# Patient Record
Sex: MALE | Race: WHITE | NOT HISPANIC OR LATINO | Employment: OTHER | URBAN - NONMETROPOLITAN AREA
[De-identification: names, ages, dates, MRNs, and addresses within clinical notes are randomized per-mention and may not be internally consistent; named-entity substitution may affect disease eponyms.]

---

## 2018-04-10 ENCOUNTER — OFFICE VISIT CONVERTED (OUTPATIENT)
Dept: FAMILY MEDICINE CLINIC | Age: 58
End: 2018-04-10
Attending: FAMILY MEDICINE

## 2018-07-12 ENCOUNTER — OFFICE VISIT CONVERTED (OUTPATIENT)
Dept: FAMILY MEDICINE CLINIC | Age: 58
End: 2018-07-12
Attending: FAMILY MEDICINE

## 2018-12-03 ENCOUNTER — OFFICE VISIT CONVERTED (OUTPATIENT)
Dept: FAMILY MEDICINE CLINIC | Age: 58
End: 2018-12-03
Attending: NURSE PRACTITIONER

## 2019-01-14 ENCOUNTER — HOSPITAL ENCOUNTER (OUTPATIENT)
Dept: OTHER | Facility: HOSPITAL | Age: 59
Discharge: HOME | End: 2019-01-14
Attending: FAMILY MEDICINE

## 2019-01-14 ENCOUNTER — OFFICE VISIT CONVERTED (OUTPATIENT)
Dept: FAMILY MEDICINE CLINIC | Age: 59
End: 2019-01-14
Attending: FAMILY MEDICINE

## 2019-01-14 LAB
ANION GAP SERPL CALC-SCNC: 15 MMOL/L (ref 8–19)
BUN SERPL-MCNC: 21 MG/DL (ref 5–25)
BUN/CREAT SERPL: 14 {RATIO} (ref 6–20)
CALCIUM SERPL-MCNC: 9 MG/DL (ref 8.7–10.4)
CHLORIDE SERPL-SCNC: 102 MMOL/L (ref 99–111)
CONV CO2: 28 MMOL/L (ref 22–32)
CREAT UR-MCNC: 1.47 MG/DL (ref 0.7–1.2)
GFR SERPLBLD BASED ON 1.73 SQ M-ARVRAT: 51 ML/MIN/{1.73_M2}
GLUCOSE SERPL-MCNC: 85 MG/DL (ref 70–99)
OSMOLALITY SERPL CALC.SUM OF ELEC: 294 MOSM/KG (ref 273–304)
POTASSIUM SERPL-SCNC: 3.8 MMOL/L (ref 3.5–5.3)
SODIUM SERPL-SCNC: 141 MMOL/L (ref 135–147)

## 2019-05-16 ENCOUNTER — HOSPITAL ENCOUNTER (OUTPATIENT)
Dept: OTHER | Facility: HOSPITAL | Age: 59
Discharge: HOME | End: 2019-05-16
Attending: FAMILY MEDICINE

## 2019-05-16 LAB
ANION GAP SERPL CALC-SCNC: 20 MMOL/L (ref 8–19)
BUN SERPL-MCNC: 16 MG/DL (ref 5–25)
BUN/CREAT SERPL: 12 {RATIO} (ref 6–20)
CALCIUM SERPL-MCNC: 9 MG/DL (ref 8.7–10.4)
CHLORIDE SERPL-SCNC: 104 MMOL/L (ref 99–111)
CONV CO2: 22 MMOL/L (ref 22–32)
CREAT UR-MCNC: 1.3 MG/DL (ref 0.7–1.2)
GFR SERPLBLD BASED ON 1.73 SQ M-ARVRAT: 59 ML/MIN/{1.73_M2}
GLUCOSE SERPL-MCNC: 93 MG/DL (ref 70–99)
OSMOLALITY SERPL CALC.SUM OF ELEC: 295 MOSM/KG (ref 273–304)
POTASSIUM SERPL-SCNC: 4.3 MMOL/L (ref 3.5–5.3)
SODIUM SERPL-SCNC: 142 MMOL/L (ref 135–147)

## 2019-08-23 ENCOUNTER — OFFICE VISIT CONVERTED (OUTPATIENT)
Dept: FAMILY MEDICINE CLINIC | Age: 59
End: 2019-08-23
Attending: FAMILY MEDICINE

## 2020-02-24 ENCOUNTER — OFFICE VISIT CONVERTED (OUTPATIENT)
Dept: FAMILY MEDICINE CLINIC | Age: 60
End: 2020-02-24
Attending: FAMILY MEDICINE

## 2020-02-24 ENCOUNTER — HOSPITAL ENCOUNTER (OUTPATIENT)
Dept: OTHER | Facility: HOSPITAL | Age: 60
Discharge: HOME | End: 2020-02-24
Attending: FAMILY MEDICINE

## 2020-02-24 LAB
ALBUMIN SERPL-MCNC: 4.6 G/DL (ref 3.5–5)
ALBUMIN/GLOB SERPL: 1.4 {RATIO} (ref 1.4–2.6)
ALP SERPL-CCNC: 53 U/L (ref 56–155)
ALT SERPL-CCNC: 35 U/L (ref 10–40)
ANION GAP SERPL CALC-SCNC: 26 MMOL/L (ref 8–19)
AST SERPL-CCNC: 29 U/L (ref 15–50)
BILIRUB SERPL-MCNC: 0.25 MG/DL (ref 0.2–1.3)
BUN SERPL-MCNC: 17 MG/DL (ref 5–25)
BUN/CREAT SERPL: 10 {RATIO} (ref 6–20)
CALCIUM SERPL-MCNC: 9.8 MG/DL (ref 8.7–10.4)
CHLORIDE SERPL-SCNC: 101 MMOL/L (ref 99–111)
CHOLEST SERPL-MCNC: 170 MG/DL (ref 107–200)
CHOLEST/HDLC SERPL: 5.3 {RATIO} (ref 3–6)
CONV CO2: 21 MMOL/L (ref 22–32)
CONV TOTAL PROTEIN: 7.8 G/DL (ref 6.3–8.2)
CREAT UR-MCNC: 1.73 MG/DL (ref 0.7–1.2)
GFR SERPLBLD BASED ON 1.73 SQ M-ARVRAT: 42 ML/MIN/{1.73_M2}
GLOBULIN UR ELPH-MCNC: 3.2 G/DL (ref 2–3.5)
GLUCOSE SERPL-MCNC: 99 MG/DL (ref 70–99)
HDLC SERPL-MCNC: 32 MG/DL (ref 40–60)
LDLC SERPL CALC-MCNC: 104 MG/DL (ref 70–100)
OSMOLALITY SERPL CALC.SUM OF ELEC: 300 MOSM/KG (ref 273–304)
POTASSIUM SERPL-SCNC: 3.8 MMOL/L (ref 3.5–5.3)
PSA SERPL-MCNC: 1.81 NG/ML (ref 0–4)
SODIUM SERPL-SCNC: 144 MMOL/L (ref 135–147)
TRIGL SERPL-MCNC: 415 MG/DL (ref 40–150)

## 2020-03-26 ENCOUNTER — OFFICE VISIT CONVERTED (OUTPATIENT)
Dept: FAMILY MEDICINE CLINIC | Age: 60
End: 2020-03-26
Attending: FAMILY MEDICINE

## 2020-06-01 ENCOUNTER — OFFICE VISIT CONVERTED (OUTPATIENT)
Dept: FAMILY MEDICINE CLINIC | Age: 60
End: 2020-06-01
Attending: FAMILY MEDICINE

## 2020-06-01 ENCOUNTER — HOSPITAL ENCOUNTER (OUTPATIENT)
Dept: OTHER | Facility: HOSPITAL | Age: 60
Discharge: HOME | End: 2020-06-01
Attending: FAMILY MEDICINE

## 2020-06-04 LAB — SARS-COV-2 RNA SPEC QL NAA+PROBE: NOT DETECTED

## 2020-07-02 ENCOUNTER — OFFICE VISIT CONVERTED (OUTPATIENT)
Dept: PULMONOLOGY | Facility: CLINIC | Age: 60
End: 2020-07-02
Attending: INTERNAL MEDICINE

## 2020-07-02 ENCOUNTER — HOSPITAL ENCOUNTER (OUTPATIENT)
Dept: OTHER | Facility: HOSPITAL | Age: 60
Discharge: HOME | End: 2020-07-02
Attending: INTERNAL MEDICINE

## 2020-07-02 LAB
BASOPHILS # BLD MANUAL: 0.06 10*3/UL (ref 0–0.2)
BASOPHILS NFR BLD MANUAL: 0.7 % (ref 0–3)
DEPRECATED RDW RBC AUTO: 41.2 FL
EOSINOPHIL # BLD MANUAL: 0.74 10*3/UL (ref 0–0.7)
EOSINOPHIL NFR BLD MANUAL: 9 % (ref 0–7)
ERYTHROCYTE [DISTWIDTH] IN BLOOD BY AUTOMATED COUNT: 12 % (ref 11.5–14.5)
GRANS (ABSOLUTE): 3.95 10*3/UL (ref 2–8)
GRANS: 48.4 % (ref 30–85)
HBA1C MFR BLD: 15.2 G/DL (ref 14–18)
HCT VFR BLD AUTO: 42.5 % (ref 42–52)
IMM GRANULOCYTES # BLD: 0.02 10*3/UL (ref 0–0.54)
IMM GRANULOCYTES NFR BLD: 0.2 % (ref 0–0.43)
LYMPHOCYTES # BLD MANUAL: 2.62 10*3/UL (ref 1–5)
LYMPHOCYTES NFR BLD MANUAL: 9.7 % (ref 3–10)
MCH RBC QN AUTO: 33.9 PG (ref 27–31)
MCHC RBC AUTO-ENTMCNC: 35.8 G/DL (ref 33–37)
MCV RBC AUTO: 94.7 FL (ref 80–96)
MONOCYTES # BLD AUTO: 0.79 10*3/UL (ref 0.2–1.2)
PLATELET # BLD AUTO: 198 10*3/UL (ref 130–400)
PMV BLD AUTO: 11.4 FL (ref 7.4–10.4)
RBC # BLD AUTO: 4.49 10*6/UL (ref 4.7–6.1)
VARIANT LYMPHS NFR BLD MANUAL: 32 % (ref 20–45)
WBC # BLD AUTO: 8.18 10*3/UL (ref 4.8–10.8)

## 2020-07-03 LAB — IGE SERPL-ACNC: 65 K[IU]/ML (ref 0–24)

## 2020-07-16 ENCOUNTER — HOSPITAL ENCOUNTER (OUTPATIENT)
Dept: OTHER | Facility: HOSPITAL | Age: 60
Discharge: HOME | End: 2020-07-16
Attending: INTERNAL MEDICINE

## 2020-08-24 ENCOUNTER — OFFICE VISIT CONVERTED (OUTPATIENT)
Dept: FAMILY MEDICINE CLINIC | Age: 60
End: 2020-08-24
Attending: FAMILY MEDICINE

## 2020-09-24 ENCOUNTER — OFFICE VISIT CONVERTED (OUTPATIENT)
Dept: PULMONOLOGY | Facility: CLINIC | Age: 60
End: 2020-09-24
Attending: INTERNAL MEDICINE

## 2021-03-19 ENCOUNTER — OFFICE VISIT CONVERTED (OUTPATIENT)
Dept: FAMILY MEDICINE CLINIC | Age: 61
End: 2021-03-19
Attending: FAMILY MEDICINE

## 2021-03-19 ENCOUNTER — HOSPITAL ENCOUNTER (OUTPATIENT)
Dept: OTHER | Facility: HOSPITAL | Age: 61
Discharge: HOME | End: 2021-03-19
Attending: FAMILY MEDICINE

## 2021-03-19 LAB
ALBUMIN SERPL-MCNC: 4.6 G/DL (ref 3.5–5)
ALBUMIN/GLOB SERPL: 1.9 {RATIO} (ref 1.4–2.6)
ALP SERPL-CCNC: 52 U/L (ref 56–155)
ALT SERPL-CCNC: 94 U/L (ref 10–40)
ANION GAP SERPL CALC-SCNC: 14 MMOL/L (ref 8–19)
AST SERPL-CCNC: 46 U/L (ref 15–50)
BILIRUB SERPL-MCNC: 0.48 MG/DL (ref 0.2–1.3)
BUN SERPL-MCNC: 13 MG/DL (ref 5–25)
BUN/CREAT SERPL: 8 {RATIO} (ref 6–20)
CALCIUM SERPL-MCNC: 8.7 MG/DL (ref 8.7–10.4)
CHLORIDE SERPL-SCNC: 100 MMOL/L (ref 99–111)
CHOLEST SERPL-MCNC: 188 MG/DL (ref 107–200)
CHOLEST/HDLC SERPL: 4.6 {RATIO} (ref 3–6)
CONV CO2: 30 MMOL/L (ref 22–32)
CONV TOTAL PROTEIN: 7 G/DL (ref 6.3–8.2)
CREAT UR-MCNC: 1.59 MG/DL (ref 0.7–1.2)
GFR SERPLBLD BASED ON 1.73 SQ M-ARVRAT: 46 ML/MIN/{1.73_M2}
GLOBULIN UR ELPH-MCNC: 2.4 G/DL (ref 2–3.5)
GLUCOSE SERPL-MCNC: 96 MG/DL (ref 70–99)
HDLC SERPL-MCNC: 41 MG/DL (ref 40–60)
LDLC SERPL CALC-MCNC: 110 MG/DL (ref 70–100)
OSMOLALITY SERPL CALC.SUM OF ELEC: 290 MOSM/KG (ref 273–304)
POTASSIUM SERPL-SCNC: 4 MMOL/L (ref 3.5–5.3)
SODIUM SERPL-SCNC: 140 MMOL/L (ref 135–147)
TRIGL SERPL-MCNC: 187 MG/DL (ref 40–150)
VLDLC SERPL-MCNC: 37 MG/DL (ref 5–37)

## 2021-04-01 ENCOUNTER — HOSPITAL ENCOUNTER (OUTPATIENT)
Dept: VACCINE CLINIC | Facility: HOSPITAL | Age: 61
Discharge: HOME | End: 2021-04-01
Attending: INTERNAL MEDICINE

## 2021-04-22 ENCOUNTER — HOSPITAL ENCOUNTER (OUTPATIENT)
Dept: VACCINE CLINIC | Facility: HOSPITAL | Age: 61
Discharge: HOME | End: 2021-04-22
Attending: INTERNAL MEDICINE

## 2021-05-18 NOTE — PROGRESS NOTES
Chad Oneill  1960     Office/Outpatient Visit    Visit Date: Mon, Feb 24, 2020 11:13 am    Provider: Kuldeep Winters MD (Assistant: Tess Smith MA)    Location: [facility]        Electronically signed by Kuldeep Winters MD on  03/07/2020 05:47:55 PM                             Subjective:        CC: NOT TAKING ZOLOFT, VITAMIN E, B12, ALLERGY MEDICATION, OMPEPRAZOLEMr. Oneill is a 60 year old White male.  This is a follow-up visit.  MC[facility], discuss flu shot         HPI:           Mr. Oneill is here for a Medicare wellness visit.  The required HRA questions are integrated within this visit note. Family medical history and individual medical/surgical history were reviewed and updated.  A current height, weight, BMI, blood pressure, and pulse were recorded in the vitals section of the note and have been reviewed. Patient's medications, including supplements, were recorded in the chart and reviewed.  Current providers and suppliers were reviewed and updated.          Self-Assessment of Health: He rates his health as good. He rates his confidence of being able to control/manage most of his health problems as very confident. His physical/emotional health has limited his social activites not at all.  A review of cognitive impairment was performed, including ability to drive a car, manage finances, and any memory changes, and was found to be negative.  A review of functional ability, including bathing, dressing, walking, and urine/bowel continence as well as level of safety was performed and was found to be negative.  Falls Risk: Has not had any falls or only one fall without injury in the past year.  He denies having trouble hearing the TV/radio when others do not, having to strain to hear or understand conversations and wearing hearing aid(s).  Concerning home safety, he reports that at home he DOES have adequate lighting and absence of throw rugs, but not a skid resistant shower/tub, grab  bars in the bath or functioning smoke alarms.  Physical Activity: He never excercises.; Type of diet patient normally eats is described as poor--needs improvement.  Preventative Health updated today.            With regard to the essential (primary) hypertension, compliance with treatment has been good.  He is tolerating the medication well without side effects.  Mr. Oneill does not check his blood pressure other than at his clinic appointments.            He is requesting prostate cancer screening.  No problems with urination. We did discuss the limitiations of PSA testing inclucing false positives as well as uncertainties related to mortality affects of treatment of prostate cancer.        He has had a history of previous colon polyp.  He is overdue for repeat of the colonoscopy.  He is willing to let us get that scheduled for him.    ROS:     CONSTITUTIONAL:  Negative for chills, fatigue and fever.      CARDIOVASCULAR:  Negative for chest pain, orthopnea, paroxysmal nocturnal dyspnea and pedal edema.      RESPIRATORY:  Positive for dyspnea and cough.      GASTROINTESTINAL:  Negative for abdominal pain, heartburn, constipation, diarrhea, and stool changes.      GENITOURINARY:  Negative for dysuria and polyuria.      PSYCHIATRIC:  Negative for anxiety and depression.          Past Medical History / Family History / Social History:         Last Reviewed on 2/24/2020 12:18 PM by Kuldeep Winters    Past Medical History:         Positive for    Asthma;         CURRENT MEDICAL PROVIDERS:    Cardiologist: Dr. Noguera    Gastroenterologist: Dr. Shah    Urologist: Dr. Urbina         PREVENTIVE HEALTH MAINTENANCE             COLORECTAL CANCER SCREENING: Up to date (colonoscopy q10y; sigmoidoscopy q5y; Cologuard q3y) was last done 11/2013, Results are in chart     PSA: was last done 7/12/18 with normal results         Surgical History:         Arthroscopy: left knee;     Biopsy of colonic polyp    injury to right  hand;    circumcision ; Procedures: colonoscopy  EGD 2013         Family History:     Father:      Mother: Coronary Artery Disease;  Pulmonary Embolism ( post op )     Brother(s): 1 brother(s) total;  Myocardial Infarction;  Prostate Cancer;  COPD     Son(s): Healthy; 1 son(s) total     Daughter(s): Healthy; 1 daughter(s) total     Paternal Grandfather:      Paternal Grandmother:      Maternal Grandfather: ;  Alcoholism     Maternal Grandmother: Cause of death was heart related         Social History:     Occupation: [employer] in [place];     Marital Status:      Children: 2 children         Tobacco/Alcohol/Supplements:     Last Reviewed on 2019 01:48 PM by Tess Smith    Tobacco: He has a past history of cigarette smoking; quit date:  .  Non-drinker         Substance Abuse History:     Last Reviewed on 2016 12:32 PM by Kiki Sanchez    None         Mental Health History:     Last Reviewed on 2016 12:32 PM by Kiki Sanchez        Communicable Diseases (eg STDs):     Last Reviewed on 2016 12:32 PM by Kiki Sanchez        Allergies:     Last Reviewed on 2020 11:19 AM by Tess Smith    No Known Allergies.        Current Medications:     Last Reviewed on 2020 11:30 AM by Tess Smith    Omeprazole 20mg Capsules, Extended Release [Take 1 capsule(s) by mouth daily, prn]    Ventolin HFA 90mcg/1actuation Oral Inhaler [Inhale 2 puff(s) by mouth 4 times a day as needed]    allergy med prn     losartan-hydrochlorothiazide 100-25 mg oral tablet [Take 1 tablet(s) by mouth daily]    Vitamin B12     Endur-C with santi hips     Vitamin E     Zoloft 50mg Tablet [1 a day]    amLODIPine 5 mg oral tablet [TAKE 1 TABLET BY MOUTH ONCE DAILY]        Objective:        Vitals:         Current: 2020 11:23:08 AM    Ht:  5 ft, 10 in;  Wt: 200 lbs;  BMI: 28.7T: 99 F (oral);  BP: 146/82 mm Hg (left arm, sitting);  P: 80 bpm (left arm (BP Cuff),  "sitting);  sCr: 1.3 mg/dL;  GFR: 60.51VA: 20/200 OD, 20/30 OS (with correction)        Exams:     PHYSICAL EXAM:     GENERAL:  well developed and nourished; appropriately groomed; in no apparent distress;     EYES: Nonicteric and with unremarkable lids, iris and pupils;     E/N/T: EARS:  normal external auditory canals and tympanic membranes;  grossly normal hearing; OROPHARYNX: oral mucosa is normal; totally edentulous; normal palate; normal tongue; posterior pharynx, including tonsils, tongue, and uvula are normal;     NECK: carotid exam reveals no bruits;     RESPIRATORY: normal respiratory rate and pattern with no distress; no rales (\"crackles\") present; no wheezes;     CARDIOVASCULAR: normal rate; rhythm is regular;  a systolic murmur is noted: it is grade 2/6;     GASTROINTESTINAL: nontender, nondistended; no hepatosplenomegaly or masses; no bruits;     GENITOURINARY: prostate:  no nodules, tenderness, or enlargement;     LYMPHATIC: no enlargement of cervical or facial nodes;     MUSCULOSKELETAL: normal overall tone No pedal edema.;     NEUROLOGIC: No lateralizing deficit.;     PSYCHIATRIC:  appropriate affect and demeanor; normal speech pattern; grossly normal memory;         Lab/Test Results:         LABORATORY RESULTS: Advance Beneficiary Notice An Advance Beneficiary Notice is on file for the Pneumonia shot./pr         Procedures:     Encounter for immunization    Regarding contraindications to an Influenza vaccine: Denies moderate/severe illness with/without fever; serious reaction to eggs, egg proteins, gentamicin, gelatin, arginine, neomycin or polymixin; serious reaction after recieving previous influenza vaccines; and history of Guillain-[place] Syndrome.              Assessment:         Z00.00   Encounter for general adult medical examination without abnormal findings       I10   Essential (primary) hypertension       J45.20   Mild intermittent asthma, uncomplicated       Z23   Encounter for " immunization       Z23   Encounter for immunization       Z12.5   Encounter for screening for malignant neoplasm of prostate       K63.5   Polyp of colon           ORDERS:         Meds Prescribed:       [New Rx] Advair Diskus 250-50 mcg/dose Inhalation Blister, With Inhalation Device [inhale 1 puff by inhalation route 2 times per day in the morning and evening approximately 12 hours apart], #1 (one) blister, Refills: 2 (two)       [Refilled] amLODIPine 10 mg oral tablet [take 1 tablet (10 mg) by oral route once daily], #30 (thirty) tablets, Refills: 2 (two)         Lab Orders:       *  PRSAS Medicare screening PSA  (Send-Out)            [id number]  [facility] CMP AND LIPID: [id number]  (Send-Out)              Procedures Ordered:       REFER  Referral to Specialist or Other Facility  (Send-Out)            87934  Pneumococcal polysaccharide vaccine, 23-valent, adult or immunosuppressed patient dosage, for use in individuals 2 years or older, for subcutaneous or intramuscular use  (In-House)            REFER  Referral to Specialist or Other Facility  (Send-Out)              Other Orders:       48604  Influenza virus vaccine, quadrivalent, split virus, preservative free 3 years of age & older  (In-House)              Administration of influenza virus vaccine  (x1)          Administration of pneumococcal vaccine  (x1)                  Plan:         Encounter for general adult medical examination without abnormal findingsReviewed preventive service recommendations and created handout     ADVANCED DIRECTIVES: None             Essential (primary) hypertensionincrease amlodipine to 10 mg     LABORATORY:  Labs ordered to be performed today include HTN/Lipid Panel: CMP, Lipid.            Prescriptions:       [Refilled] amLODIPine 10 mg oral tablet [take 1 tablet (10 mg) by oral route once daily], #30 (thirty) tablets, Refills: 2 (two)           Orders:       39183  [facility] - [facility] CMP AND LIPID: [id number]   (Send-Out)              Mild intermittent asthma, uncomplicated        REFERRALS:  Referral initiated to a pulmonologist ( Dr. Sam Le, , [facility] ).            Prescriptions:       [New Rx] Advair Diskus 250-50 mcg/dose Inhalation Blister, With Inhalation Device [inhale 1 puff by inhalation route 2 times per day in the morning and evening approximately 12 hours apart], #1 (one) blister, Refills: 2 (two)           Orders:       REFER  Referral to Specialist or Other Facility  (Send-Out)              Encounter for immunization        IMMUNIZATIONS given today: Influenza Quadrivalent psv free shot 3 & up.            Immunizations:       35600  Influenza virus vaccine, quadrivalent, split virus, preservative free 3 years of age & older  (In-House)                Dose (ml): 0.5  Site: left deltoid  Route: intramuscular  Administered by: Tess Smith          : [employer]  Lot #:   Exp: 06/30/2020          ND: [address]-42        Administration of influenza virus vaccine  (x1)          Encounter for immunization        IMMUNIZATIONS given today: Pneumovax.            Immunizations:       49112  Pneumococcal polysaccharide vaccine, 23-valent, adult or immunosuppressed patient dosage, for use in individuals 2 years or older, for subcutaneous or intramuscular use  (In-House)                Dose (ml): 0.5  Site: right deltoid  Route: intramuscular  Administered by: Kathi Gupta          : [employer]  Lot #: [id number]  Exp: 06/02/2021          NDC: [address]-26        Administration of pneumococcal vaccine  (x1)          Encounter for screening for malignant neoplasm of prostate    LABORATORY:  Labs ordered to be performed today include PSA.            Orders:       *  PRSAS Medicare screening PSA  (Send-Out)              Polyp of colon        REFERRALS:  Referral initiated to a general surgeon ( Dr. Rogelio Vazquez; a colonoscopy ).             Orders:       REFER  Referral to Specialist or Other Facility  (Send-Out)                  Other Patient Education Handouts:     Dragon transcription disclaimer:        Much of this encounter note is an electronic transcription/translation of spoken language to printed text.  The electronic translation of spoken language may permit erroneous, or at times, nonsensical words or phrases to be inadvertently transcribed.  Although I have reviewed the note for such errors, some may still exist.        Charge Capture:         Primary Diagnosis:     Z00.00  Encounter for general adult medical examination without abnormal findings           Orders:      20837  Preventive medicine, established patient, age 40-64 years  (In-House)              I10  Essential (primary) hypertension           Orders:      04109-40  Office/outpatient visit; established patient, level 4  (In-House)              J45.20  Mild intermittent asthma, uncomplicated     Z23  Encounter for immunization           Orders:      88356  Influenza virus vaccine, quadrivalent, split virus, preservative free 3 years of age & older  (In-House)              Administration of influenza virus vaccine  (x1)          Z23  Encounter for immunization           Orders:      53516  Pneumococcal polysaccharide vaccine, 23-valent, adult or immunosuppressed patient dosage, for use in individuals 2 years or older, for subcutaneous or intramuscular use  (In-House)              Administration of pneumococcal vaccine  (x1)          Z12.5  Encounter for screening for malignant neoplasm of prostate     K63.5  Polyp of colon

## 2021-05-18 NOTE — PROGRESS NOTES
"Chad OneillTimothy 1960     Office/Outpatient Visit    Visit Date: Mon, Dec 3, 2018 01:39 pm    Provider: Loren Grullon N.P. (Assistant: Kiki Schmidt RN)    Location: [facility]        Electronically signed by Loren Grullon N.P. on  2018 02:22:02 PM                             SUBJECTIVE:        CC: discuss flu vaccination         HPI:         Patient presents with asthma.  He has asthma which was first diagnosed in infancy.  The frequency of attacks averages once every few months.  He is currently at baseline, and not having an exacerbation.  His current asthma medication includes albuterol MDI.  Currently, no asthma symptoms are reported.  Needs refiller on Albuterol inhaler. Has not used for \"some time.\"     ROS:     CONSTITUTIONAL:  Negative for chills and fever.      EYES:  Negative for blurred vision and eye drainage.      E/N/T:  Negative for ear pain and sore throat.      CARDIOVASCULAR:  Negative for chest pain and palpitations.      RESPIRATORY:  Positive for dyspnea ( with asthma exacerbation. none current ).   Negative for frequent wheezing.          [facility]/:     Last Reviewed on 2018 11:56 AM by Kuldeep Winters    Past Medical History:         Positive for    Asthma;         CURRENT MEDICAL PROVIDERS:    Cardiologist: Dr. Noguera    Gastroenterologist: Dr. Shah    Urologist: Dr. Urbina         PREVENTIVE HEALTH MAINTENANCE             COLORECTAL CANCER SCREENING: Up to date (colonoscopy q10y; sigmoidoscopy q5y; Cologuard q3y) was last done 2013, Results are in chart     PSA: was last done 3/14/2012 with normal results         Surgical History:         Arthroscopy: left knee;     Biopsy of colonic polyp    injury to right hand;    circumcision ; Procedures: colonoscopy  EGD 2013         Family History:     Father:      Mother: Coronary Artery Disease;  Pulmonary Embolism ( post op )     Brother(s): 1 brother(s) total;  Myocardial Infarction;  " Prostate Cancer;  COPD     Son(s): Healthy; 1 son(s) total     Daughter(s): Healthy; 1 daughter(s) total     Paternal Grandfather:      Paternal Grandmother:      Maternal Grandfather: ;  Alcoholism     Maternal Grandmother: Cause of death was heart related         Social History:     Occupation: [employer] in [place];     Marital Status:      Children: 2 children         Tobacco/Alcohol/Supplements:     Last Reviewed on 2018 11:31 AM by Jaci Brady    Tobacco: He has a past history of cigarette smoking; quit date:  .  Non-drinker         Substance Abuse History:     Last Reviewed on 2016 12:32 PM by Kiki Sanchez    None         Mental Health History:     Last Reviewed on 2016 12:32 PM by Kiki Sanchez        Communicable Diseases (eg STDs):     Last Reviewed on 2016 12:32 PM by Kiki Sanchez            Current Problems:     Last Reviewed on 2016 12:32 PM by Kiki Sanchez    Anxiety with depression     Aortic stenosis     Asthma     Colon polyp     Anxiety     Dizziness     Essential hypertension, benign     Hypertension     Fatigue     Vision problems     Chronic kidney disease, Stage III (moderate)     GERD         Immunizations:     zzFluzone pf-quadrivalent 3 and up 10/29/2014     zzFluzone pf-quadrivalent 3 and up 2015     zzFluzone pf-quadrivalent 3 and up 10/27/2016     Fluzone (3 + years dose) 2010     Fluzone (3 + years dose) 2010     Fluzone (3 + years dose) 2011     Fluzone (3 + years dose) 1/3/2013     Fluzone pf (3+ years dose) 2013     Influenza A (H1N1), IM (3+ years) Monovalent 2010     PPD 2015         Allergies:     Last Reviewed on 2018 11:30 AM by Jaci Brady      No Known Drug Allergies.         Current Medications:     Last Reviewed on 2018 01:44 PM by Kiki Schmidt    Zoloft 50mg Tablet 1 a day     Losartan/Hydrochlorothiazide 100mg/25mg Tablet Take 1 tablet(s) by mouth daily      Omeprazole 20mg Capsules, Extended Release Take 1 capsule(s) by mouth daily, prn     Vitamin B12     Vitamin C     Vitamin E     allergy med prn     Ibuprofen 800mg Tablet 1 q 6 - 8 hours prn.         OBJECTIVE:        Vitals:         Historical:     07/12/2018  BP:   129/87 mm Hg ( (left arm, , sitting, );)     04/10/2018  BP:   169/91 mm Hg ( (left arm, , sitting, );)         Current: 12/3/2018 1:46:25 PM    Ht:  5 ft, 10 in;  Wt: 205.2 lbs;  BMI: 29.4    T: 98.1 F (oral);  BP: 159/83 mm Hg (left arm, sitting);  P: 75 bpm (left arm (BP Cuff), sitting);  sCr: 1.53 mg/dL;  GFR: 53.26        Exams:     PHYSICAL EXAM:     GENERAL: well developed, well nourished;  no apparent distress;     EYES: PERRL, EOMI     E/N/T: EARS: external auditory canal normal;  bilateral TMs are normal;  NOSE: normal turbinates; no sinus tenderness; OROPHARYNX: oral mucosa is normal; posterior pharynx shows no exudate and post nasal drip;     NECK: range of motion is normal; trachea is midline;     RESPIRATORY: normal respiratory rate and pattern with no distress; normal breath sounds with no rales, rhonchi, wheezes or rubs;     CARDIOVASCULAR: normal rate; rhythm is regular;     GASTROINTESTINAL: nontender, nondistended; no hepatosplenomegaly or masses; no bruits;     MUSCULOSKELETAL: normal gait;     NEUROLOGIC: mental status: alert and oriented x 3; GROSSLY INTACT     PSYCHIATRIC: appropriate affect and demeanor;         Procedures:     Influenza vaccination     1. Influenza, seasonal PF (children 3 years to adult): 0.5 ml unit dose given IM in the left upper arm; administered by AS;  lot number [id number]; expires 06/30/19 Regarding contraindications to an Influenza vaccine: Denies moderate/severe illness with/without fever; serious reaction to eggs, egg proteins, gentamicin, gelatin, arginine, neomycin or polymixin; serious reaction after recieving previous influenza vaccines; and history of Guillain-[place] Syndrome.               ASSESSMENT           493.00   J45.20  Asthma              DDx:     V04.81   Z23  Influenza vaccination              DDx:     401.1   I10  Hypertension              DDx:         ORDERS:         Meds Prescribed:       Ventolin HFA (Albuterol) 90mcg/1actuation Oral Inhaler Inhale 2 puff(s) by mouth 4 times a day as needed  #1 (One) inhaler(s) Refills: 0         Other Orders:       [id number]  Influenza virus vaccine, quadrivalent, split virus, preservative free 3 years of age & older  (In-House)           Administration of influenza virus vaccine (x1)                 PLAN: Follow up with PCP for chronic visit follow up.          Asthma         RECOMMENDATIONS given include: identification and avoidance of asthma triggers.      FOLLOW-UP: Schedule follow-up appointments on a p.r.n. basis. Chronic visit follow up           Prescriptions:       Ventolin HFA (Albuterol) 90mcg/1actuation Oral Inhaler Inhale 2 puff(s) by mouth 4 times a day as needed  #1 (One) inhaler(s) Refills: 0          Influenza vaccination           Orders:       [id number]  Influenza virus vaccine, quadrivalent, split virus, preservative free 3 years of age & older  (In-House)                     Administration of influenza virus vaccine (x1)          Hypertension Notes that he has missed doses of his BP medication this week and last week. Discussed that BP is above goal today. Advised continue current dose of BP medications and take on regular basis. BP log and follow up with PCP for chronic visit follow up.             Patient Recommendations:        For  Asthma:     Avoid anything that you have been able to identify as a trigger for your asthma (for example, cigarette smoke, cat or dog hair, chemical fumes, etc.).  Schedule follow-up appointments as needed.              CHARGE CAPTURE           **Please note: ICD descriptions below are intended for billing purposes only and may not represent clinical diagnoses**        Primary Diagnosis:          493.00 Asthma            J45.20    Mild intermittent asthma, uncomplicated              Orders:          89145   Office/outpatient visit; established patient, level 3  (In-House)           V04.81 Influenza vaccination            Z23    Encounter for immunization              Orders:          22818   Influenza virus vaccine, quadrivalent, split virus, preservative free 3 years of age & older  (In-House)                                           Administration of influenza virus vaccine (x1)         401.1 Hypertension            I10    Essential (primary) hypertension

## 2021-05-18 NOTE — PROGRESS NOTES
Chad Oneill 1960     Office/Outpatient Visit    Visit Date: Mon, Jan 14, 2019 09:11 am    Provider: Kuldeep Winters MD (Assistant: Sarah Spurling, MA)    Location: [facility]        Electronically signed by Kuldeep Winters MD on  01/14/2019 03:33:01 PM                             SUBJECTIVE:        CC:     Mr. Oneill is a 58 year old White male.  This is a follow-up visit.  The vitamins, he says that he stopped taking them. He said he is needing pain killers again. He also said he is needing refills.  He went to the hospital on 1-7-19 because he said he couldn't breathe, and he almost lost his right eye. He was seen at [facility].          HPI:         Mr. Oneill presents with hypertension.  Compliance with treatment has been good; he takes his medication as directed and follows up as directed.      He had been to hospital for flare up of asthma.  Reviewed the ER record. He is better, almost back to normal. He is using breathing machine every 6 hours.     ROS:     CONSTITUTIONAL:  Negative for chills, fatigue, fever and weight change.      CARDIOVASCULAR:  Negative for chest pain, orthopnea, paroxysmal nocturnal dyspnea and pedal edema.      RESPIRATORY:  Negative for dyspnea and cough.      GASTROINTESTINAL:  Negative for abdominal pain, heartburn, constipation, diarrhea, and stool changes.      PSYCHIATRIC:  Negative for anxiety and depression.          [facility]/:     Last Reviewed on 7/12/2018 11:56 AM by Kuldeep Winters    Past Medical History:         Positive for    Asthma;         CURRENT MEDICAL PROVIDERS:    Cardiologist: Dr. Noguera    Gastroenterologist: Dr. Shah    Urologist: Dr. Urbina         PREVENTIVE [facility] MAINTENANCE             COLORECTAL CANCER SCREENING: Up to date (colonoscopy q10y; sigmoidoscopy q5y; Cologuard q3y) was last done 11/2013, Results are in chart     PSA: was last done 3/14/2012 with normal results         Surgical History:         Arthroscopy: left  knee;     Biopsy of colonic polyp    injury to right hand;    circumcision ; Procedures: colonoscopy  EGD 2013         Family History:     Father:      Mother: Coronary Artery Disease;  Pulmonary Embolism ( post op )     Brother(s): 1 brother(s) total;  Myocardial Infarction;  Prostate Cancer;  COPD     Son(s): Healthy; 1 son(s) total     Daughter(s): Healthy; 1 daughter(s) total     Paternal Grandfather:      Paternal Grandmother:      Maternal Grandfather: ;  Alcoholism     Maternal Grandmother: Cause of death was heart related         Social History:     Occupation: [employer] in [place];     Marital Status:      Children: 2 children         Tobacco/Alcohol/Supplements:     Last Reviewed on 2018 11:31 AM by Jaci Brady    Tobacco: He has a past history of cigarette smoking; quit date:  .  Non-drinker         Substance Abuse History:     Last Reviewed on 2016 12:32 PM by Kiki Sanchez    None         Mental Health History:     Last Reviewed on 2016 12:32 PM by Kiki Sanchez        Communicable Diseases (eg STDs):     Last Reviewed on 2016 12:32 PM by Kiki Sanchez            Allergies:     Last Reviewed on 2018 11:30 AM by Jaci Brady      No Known Drug Allergies.         Current Medications:     Last Reviewed on 2018 01:44 PM by Kiki Schmidt    Losartan/Hydrochlorothiazide 100mg/25mg Tablet Take 1 tablet(s) by mouth daily     Ventolin HFA 90mcg/1actuation Oral Inhaler Inhale 2 puff(s) by mouth 4 times a day as needed     Zoloft 50mg Tablet 1 a day     Omeprazole 20mg Capsules, Extended Release Take 1 capsule(s) by mouth daily, prn     Ibuprofen 800mg Tablet 1 q 6 - 8 hours prn.     Vitamin B12     Vitamin C     Vitamin E     allergy med prn         OBJECTIVE:        Vitals:         Current: 2019 9:21:19 AM    Ht:  5 ft, 10 in;  Wt: 201.4 lbs;  BMI: 28.9    T: 98.2 F (oral);  BP: 156/80 mm Hg (left arm, sitting);  P:  65 bpm (left arm (BP Cuff), sitting);  sCr: 1.53 mg/dL;  GFR: 52.83        Exams:     PHYSICAL EXAM:     GENERAL:  well developed and nourished; appropriately groomed; in no apparent distress;     EYES: Nonicteric and with unremarkable lids, iris and pupils;     NECK: carotid exam reveals no bruits;     RESPIRATORY: normal respiratory rate and pattern with no distress; normal breath sounds with no rales, rhonchi, wheezes or rubs;     CARDIOVASCULAR: normal rate; rhythm is regular;  no systolic murmur;     LYMPHATIC: no enlargement of cervical or facial nodes;     MUSCULOSKELETAL: normal overall tone No pedal edema.;     NEUROLOGIC: No lateralizing deficit.;     PSYCHIATRIC:  appropriate affect and demeanor; normal speech pattern; grossly normal memory;         ASSESSMENT:           401.1   I10  Hypertension              DDx:     493.00   J45.20  Asthma              DDx:         ORDERS:         Meds Prescribed:       Amlodipine  5mg Tablet Take 1 tablet(s) by mouth daily  #90 (Ninety) tablet(s) Refills: 0         Lab Orders:       [id number]  [facility] Basic Metabolic Panel  (Send-Out)                   PLAN:          Hypertension will add Amlodipine     LABORATORY:  Labs ordered to be performed today include basic metabolic panel.            Prescriptions:       Amlodipine  5mg Tablet Take 1 tablet(s) by mouth daily  #90 (Ninety) tablet(s) Refills: 0           Orders:       [id number]  [facility] Basic Metabolic Panel  (Send-Out)            Asthma cont rx             CHARGE CAPTURE:           Primary Diagnosis:     401.1 Hypertension            I10    Essential (primary) hypertension              Orders:          [id number]   Office/outpatient visit; established patient, level 3  (In-House)           493.00 Asthma            J45.20    Mild intermittent asthma, uncomplicated

## 2021-05-18 NOTE — PROGRESS NOTES
Chad Oneill  1960     Office/Outpatient Visit    Visit Date: Fri, Mar 19, 2021 12:25 pm    Provider: Kuldeep Winters MD (Assistant: Chyna Pavon MA)    Location: [facility]        Electronically signed by Kuldeep Winters MD on  03/19/2021 01:31:32 PM                             Subjective:        CC: Mr. Oneill is a 61 year old White male.  This is a follow-up visit.  med refills; wants to discuss pulmonary visit from 9-2020         HPI:       Here for general follow-up of his chronic health conditions.  He has hypertension tolerates his medication without difficulty no lightheadedness or dizziness.  Also has a history of emphysema.  We reviewed the note from the pulmonologist.  He states that he is using the Symbicort as directed.  He does not smoke.  Also has a history of chronic kidney disease of which he is aware.  He knows he has to avoid NSAIDs.  He does ask what he can take for headaches and I recommended Tylenol.  Also has known aortic regurgitation aortic stenosis.  His last echocardiogram was in 2020    ROS:     CONSTITUTIONAL:  Negative for chills, fatigue and fever.      CARDIOVASCULAR:  Negative for chest pain, orthopnea, paroxysmal nocturnal dyspnea and pedal edema.      RESPIRATORY:  Negative for dyspnea and cough.      GASTROINTESTINAL:  Negative for abdominal pain, heartburn, constipation, diarrhea, and stool changes.      GENITOURINARY:  Negative for dysuria and polyuria.      NEUROLOGICAL:  Positive for dizziness ( the other day, pt thinks he used inhaler an extra time? has since resolved ).      PSYCHIATRIC:  Negative for anxiety and depression.          Past Medical History / Family History / Social History:         Last Reviewed on 3/19/2021 01:19 PM by Kuldeep Winters    Past Medical History:         Positive for    Asthma;         CURRENT MEDICAL PROVIDERS:    Cardiologist: Dr. Noguera    Gastroenterologist: Dr. Shah    Urologist: Dr. Urbina          PREVENTIVE HEALTH MAINTENANCE             COLORECTAL CANCER SCREENING: Up to date (colonoscopy q10y; sigmoidoscopy q5y; Cologuard q3y) was last done 2013, 2020, Results are in chart     PSA: was last done 18 with normal results         Surgical History:         Arthroscopy: left knee;     Biopsy of colonic polyp    injury to right hand;    circumcision ;     Procedures: EGD 2013         Family History:     Father:      Mother: Coronary Artery Disease;  Pulmonary Embolism ( post op )     Brother(s): 1 brother(s) total;  Myocardial Infarction;  Prostate Cancer;  COPD     Son(s): Healthy; 1 son(s) total     Daughter(s): Healthy; 1 daughter(s) total     Paternal Grandfather:      Paternal Grandmother:      Maternal Grandfather: ;  Alcoholism     Maternal Grandmother: Cause of death was heart related         Social History:     Occupation: [employer] in [place];     Marital Status:      Children: 2 children         Tobacco/Alcohol/Supplements:     Last Reviewed on 3/19/2021 12:30 PM by Chyna Pavon    Tobacco: He has a past history of cigarette smoking; quit date:  .  Non-drinker         Substance Abuse History:     Last Reviewed on 2016 12:32 PM by Kiki Sanchez    None         Mental Health History:     Last Reviewed on 2016 12:32 PM by Kiki Sanchez        Communicable Diseases (eg STDs):     Last Reviewed on 2016 12:32 PM by Kiki Sanchez        Allergies:     Last Reviewed on 3/19/2021 12:30 PM by Chyna Pavon    No Known Allergies.        Current Medications:     Last Reviewed on 3/19/2021 12:31 PM by Chyna Pavon    Symbicort 160-4.5 mcg/actuation Inhalation HFA Aerosol Inhaler [inhale 2 puffs by inhalation route 2 times per day in the morning and evening]    omeprazole 20 mg oral capsule,delayed release (enteric coated) [TAKE 1 CAPSULE BY MOUTH ONCE DAILY AS NEEDED]    Ventolin HFA 90 mcg/actuation Inhalation HFA Aerosol Inhaler [Inhale 2  "puff(s) by mouth q4 hrs  as needed for cough, wheeze, shortness of breath. Seek medical attention if symptoms are not relieved ]    losartan-hydrochlorothiazide 100-25 mg oral tablet [Take 1 tablet by mouth once daily]    amLODIPine 10 mg oral tablet [Take 1 tablet by mouth once daily]        Objective:        Vitals:         Current: 3/19/2021 12:33:52 PM    Ht:  5 ft, 10 in;  Wt: 215.4 lbs;  BMI: 30.9T: 98.3 F (temporal);  BP: 134/80 mm Hg (left arm, sitting);  P: 72 bpm (left arm (BP Cuff), sitting);  sCr: 1.73 mg/dL;  GFR: 46.35O2 Sat: 98 % (room air)        Exams:     PHYSICAL EXAM:     GENERAL:  well developed and nourished; appropriately groomed; in no apparent distress;     EYES: Nonicteric and with unremarkable lids, iris and pupils;     E/N/T: EARS:  normal external auditory canals and tympanic membranes;  grossly normal hearing; OROPHARYNX: oral mucosa is normal; totally edentulous; normal palate; normal tongue; posterior pharynx, including tonsils, tongue, and uvula are normal;     NECK: carotid exam reveals no bruits;     RESPIRATORY: normal respiratory rate and pattern with no distress; no rales (\"crackles\") present; no wheezes;     CARDIOVASCULAR: normal rate; rhythm is regular;  a systolic murmur is noted: it is grade 4/6;     LYMPHATIC: no enlargement of cervical or facial nodes;     MUSCULOSKELETAL: normal overall tone No pedal edema.;     NEUROLOGIC: No lateralizing deficit.;     PSYCHIATRIC:  appropriate affect and demeanor; normal speech pattern; grossly normal memory;         Assessment:         I10   Essential (primary) hypertension       I35.0   Nonrheumatic aortic (valve) stenosis       N18.30   Chronic kidney disease, stage 3 unspecified       J43.9   Emphysema, unspecified           ORDERS:         Meds Prescribed:       [Refilled] losartan-hydrochlorothiazide 100-25 mg oral tablet [Take 1 tablet by mouth once daily], #90 (ninety) tablets, Refills: 0 (zero)       [Refilled] amLODIPine 10 " mg oral tablet [Take 1 tablet by mouth once daily], #90 (ninety) tablets, Refills: 0 (zero)       [Refilled] omeprazole 20 mg oral capsule,delayed release (enteric coated) [TAKE 1 CAPSULE BY MOUTH ONCE DAILY AS NEEDED], #90 (ninety) capsules, Refills: 0 (zero)         Lab Orders:       [id number]  [facility] CMP AND LIPID: [id number], [id number]  (Send-Out)                      Plan:         Essential (primary) hypertension    LABORATORY:  Labs ordered to be performed today include HTN/Lipid Panel: CMP, Lipid.            Prescriptions:       [Refilled] losartan-hydrochlorothiazide 100-25 mg oral tablet [Take 1 tablet by mouth once daily], #90 (ninety) tablets, Refills: 0 (zero)       [Refilled] amLODIPine 10 mg oral tablet [Take 1 tablet by mouth once daily], #90 (ninety) tablets, Refills: 0 (zero)       [Refilled] omeprazole 20 mg oral capsule,delayed release (enteric coated) [TAKE 1 CAPSULE BY MOUTH ONCE DAILY AS NEEDED], #90 (ninety) capsules, Refills: 0 (zero)           Orders:       [id number]  [facility] CMP AND LIPID: [id number], [id number]  (Send-Out)              Nonrheumatic aortic (valve) stenosisLet them know if he has continued issues with dizziness he needs to make us aware        Chronic kidney disease, stage 3 unspecifiedAvoid nephrotoxins we will check labs today        Emphysema, unspecifiedAvoid exposures.  Continue to follow-up with pulmonology.  Use medications as prescribed.I did encourage him to take the Covid vaccine.            Other Patient Education Handouts:     Dragon transcription disclaimer:        Much of this encounter note is an electronic transcription/translation of spoken language to printed text.  The electronic translation of spoken language may permit erroneous, or at times, nonsensical words or phrases to be inadvertently transcribed.  Although I have reviewed the note for such errors, some may still exist.        Charge Capture:         Primary Diagnosis:     I10  Essential (primary) hypertension            Orders:      [id number]  Office/outpatient visit; established patient, level 4  (In-House)              I35.0  Nonrheumatic aortic (valve) stenosis     N18.30  Chronic kidney disease, stage 3 unspecified     J43.9  Emphysema, unspecified

## 2021-05-18 NOTE — PROGRESS NOTES
Chad Oneill 1960     Office/Outpatient Visit    Visit Date: Tue, Apr 10, 2018 10:38 am    Provider: Kuldeep Winters MD (Assistant: Marissa Levi MA)    Location: [facility]        Electronically signed by Kuldeep Winters MD on  04/15/2018 04:32:37 PM                             SUBJECTIVE:        CC:     Mr. Oneill is a 58 year old White male.  Med refill         HPI:         Patient to be evaluated for hypertension.  Mr. Oneill does not check his blood pressure other than at his clinic appointments.  He is tolerating the medication well without side effects.  Compliance with treatment has been good.      We did review his ECHO report from last year in April that showed mild AS.  He doesn't have any symptoms.     ROS:     CONSTITUTIONAL:  Negative for chills, fatigue, fever and weight change.      CARDIOVASCULAR:  Negative for chest pain, orthopnea, paroxysmal nocturnal dyspnea and pedal edema.      RESPIRATORY:  Negative for dyspnea and cough.      GASTROINTESTINAL:  Negative for abdominal pain, heartburn, constipation, diarrhea, and stool changes.      GENITOURINARY:  Negative for dysuria and polyuria.      PSYCHIATRIC:  Negative for anxiety and depression.          PMH/FMH/SH:     Last Reviewed on 2016 12:32 PM by Kiki Sanchez    Past Medical History:         Positive for    Asthma;         CURRENT MEDICAL PROVIDERS:    Cardiologist: Dr. Noguera    Gastroenterologist: Dr. Shah    Urologist: Dr. Urbina         Surgical History:         Arthroscopy: left knee;     Biopsy of colonic polyp    injury to right hand;    circumcision ; Procedures: colonoscopy  EGD 2013         Family History:     Father:      Mother: Coronary Artery Disease;  Pulmonary Embolism ( post op )     Brother(s): 1 brother(s) total;  COPD     Son(s): Healthy; 1 son(s) total     Daughter(s): Healthy; 1 daughter(s) total     Paternal Grandfather:      Paternal Grandmother:       Maternal Grandfather: ;  Alcoholism     Maternal Grandmother: Cause of death was heart related         Social History:     Occupation: [employer] in [place];     Marital Status:      Children: 2 children         Tobacco/Alcohol/Supplements:     Last Reviewed on 4/10/2018 10:46 AM by Marissa Levi    Tobacco: He has a past history of cigarette smoking; quit date:  .  Non-drinker         Substance Abuse History:     Last Reviewed on 2016 12:32 PM by Kiki Sanchez    None         Mental Health History:     Last Reviewed on 2016 12:32 PM by Kiki Sanchez        Communicable Diseases (eg STDs):     Last Reviewed on 2016 12:32 PM by Kiki Sanchez            Allergies:     Last Reviewed on 4/10/2018 10:46 AM by Marissa Levi      No Known Drug Allergies.         Current Medications:     Last Reviewed on 4/10/2018 10:46 AM by Marissa Levi    Losartan/Hydrochlorothiazide 50mg/12.5mg Tablet Take 1 tablet(s) by mouth daily     Omeprazole 20mg Capsules, Extended Release Take 1 capsule(s) by mouth daily, prn     allergy med prn     Vitamin B12     Vitamin C     Vitamin E         OBJECTIVE:        Vitals:         Current: 4/10/2018 10:43:05 AM    Ht:  5 ft, 10 in;  Wt: 202.2 lbs;  BMI: 29.0    T: 98.4 F (oral);  BP: 169/91 mm Hg (left arm, sitting);  P: 70 bpm (left arm (BP Cuff), sitting);  sCr: 1.26 mg/dL;  GFR: 64.26        Exams:     PHYSICAL EXAM:     GENERAL:  well developed and nourished; appropriately groomed; in no apparent distress;     EYES: Nonicteric and with unremarkable lids, iris and pupils;     NECK: carotid exam reveals no bruits;     RESPIRATORY: normal respiratory rate and pattern with no distress; normal breath sounds with no rales, rhonchi, wheezes or rubs;     CARDIOVASCULAR: normal rate; rhythm is regular;  a systolic murmur is noted: it is grade 3/6 and heard best at the upper left sternal border and upper right sternal border;     LYMPHATIC: no enlargement of cervical or  facial nodes;     MUSCULOSKELETAL: normal overall tone No pedal edema.;     NEUROLOGIC: No lateralizing deficit.;     PSYCHIATRIC:  appropriate affect and demeanor; normal speech pattern; grossly normal memory;         ASSESSMENT           401.1   I10  Hypertension              DDx:     396.0   I35.0  Aortic stenosis              DDx:         ORDERS:         Meds Prescribed:       Refill of: Losartan/Hydrochlorothiazide 100mg/25mg Tablet Take 1 tablet(s) by mouth daily  #90 (Ninety) tablet(s) Refills: 0         Lab Orders:       FUTURE  Future order to be done at patients convenience  (Send-Out)                   PLAN:          Hypertension increase dose         FOLLOW-UP TESTING #1: Patient to schedule in 3 months.            Prescriptions:       Refill of: Losartan/Hydrochlorothiazide 100mg/25mg Tablet Take 1 tablet(s) by mouth daily  #90 (Ninety) tablet(s) Refills: 0           Orders:       FUTURE  Future order to be done at patients convenience  (Send-Out)            Aortic stenosis cont to follow             Patient Recommendations:        For  Hypertension:     Schedule the above testing in 3 months.              CHARGE CAPTURE           **Please note: ICD descriptions below are intended for billing purposes only and may not represent clinical diagnoses**        Primary Diagnosis:         401.1 Hypertension            I10    Essential (primary) hypertension              Orders:          [id number]   Office/outpatient visit; established patient, level 3  (In-House)           396.0 Aortic stenosis            I35.0    Nonrheumatic aortic (valve) stenosis

## 2021-05-18 NOTE — PROGRESS NOTES
Chad Oneill 1960     Office/Outpatient Visit    Visit Date: Thu, Jul 12, 2018 11:25 am    Provider: Kuldeep Winters MD (Assistant: Jaci Brady MA)    Location: [facility]        Electronically signed by Kuldeep Winters MD on  07/12/2018 03:02:22 PM                             SUBJECTIVE:        CC:     Mr. Oneill is a 58 year old White male.  This is a follow-up visit.  physical exam         HPI:         Mr. Oneill presents with hypertension.  He did not bring his blood pressure diary, but says that pressures have been okay.  He is tolerating the medication well without side effects.  Compliance with treatment has been good.          Mr. Oneill is here for a Medicare wellness visit.  Individual and family medical history was reviewed and updated A list of current providers and suppliers reviewed and updated A current height, weight, BMI, blood pressure, and pulse were recorded in the vitals section of the note and have been reviewed A review of cognitive impairment was performed and was negative.  A review of functional ability and level of safety was performed and was negative He denies having trouble hearing the TV/radio when others do not, having to strain to hear or understand conversations and wearing hearing aid(s).  Concerning home safety, He denies his home having throw rugs, poor lighting, a slippery bath or shower, grab bars in the bath, handrails on stairs and functioning smoke alarms.      Immunization Status: Declines;     Physical Activity: ** Never exercises; Has not had any falls or only one fall without injury in the past year.    Preventative Health updated today.          PHQ-9 Depression Screening: Completed form scanned and in chart; Total Score 0 Alcohol Consumption Screening: Completed form scanned and in chart; Total Score 0     He has mild AS noted on ECHO about a year ago.  He has no CP, syncope or SOA.     Says that he is doing well on his Setraline for  anxiety/depression.  He says without the medication he gets dizzy feeling so wants to continue on the same dose.         He is requesting prostate cancer screening.  No problems with urination.      ROS:     CONSTITUTIONAL:  Negative for chills, fatigue, fever and weight change.      CARDIOVASCULAR:  Negative for chest pain, orthopnea, paroxysmal nocturnal dyspnea and pedal edema.      RESPIRATORY:  Negative for dyspnea and cough.      GASTROINTESTINAL:  Negative for abdominal pain, heartburn, constipation, diarrhea, and stool changes.      GENITOURINARY:  Negative for dysuria and polyuria.      PSYCHIATRIC:  Negative for anxiety and depression.          PMH/FMH/SH:     Last Reviewed on 2018 11:56 AM by Kuldeep Winters    Past Medical History:         Positive for    Asthma;         CURRENT MEDICAL PROVIDERS:    Cardiologist: Dr. Noguera    Gastroenterologist: Dr. Shah    Urologist: Dr. Urbina         PREVENTIVE HEALTH MAINTENANCE             COLORECTAL CANCER SCREENING: Up to date (colonoscopy q10y; sigmoidoscopy q5y; Cologuard q3y) was last done 2013, Results are in chart     PSA: was last done 3/14/2012 with normal results         Surgical History:         Arthroscopy: left knee;     Biopsy of colonic polyp    injury to right hand;    circumcision ; Procedures: colonoscopy  EGD 2013         Family History:     Father:      Mother: Coronary Artery Disease;  Pulmonary Embolism ( post op )     Brother(s): 1 brother(s) total;  Myocardial Infarction;  Prostate Cancer;  COPD     Son(s): Healthy; 1 son(s) total     Daughter(s): Healthy; 1 daughter(s) total     Paternal Grandfather:      Paternal Grandmother:      Maternal Grandfather: ;  Alcoholism     Maternal Grandmother: Cause of death was heart related         Social History:     Occupation: [employer] in [place];     Marital Status:      Children: 2 children         Tobacco/Alcohol/Supplements:      Last Reviewed on 7/12/2018 11:31 AM by Jaci Brady    Tobacco: He has a past history of cigarette smoking; quit date:  1980's.  Non-drinker         Substance Abuse History:     Last Reviewed on 2/02/2016 12:32 PM by Kiki Sanchez    None         Mental Health History:     Last Reviewed on 2/02/2016 12:32 PM by Kiki Sanchez        Communicable Diseases (eg STDs):     Last Reviewed on 2/02/2016 12:32 PM by Kiki Sanchez            Allergies:     Last Reviewed on 7/12/2018 11:30 AM by Jaci Brady      No Known Drug Allergies.         Current Medications:     Last Reviewed on 7/12/2018 11:30 AM by Jaci Brady    Losartan/Hydrochlorothiazide 100mg/25mg Tablet Take 1 tablet(s) by mouth daily     Zoloft 50mg Tablet 1 a day     Omeprazole 20mg Capsules, Extended Release Take 1 capsule(s) by mouth daily, prn     Vitamin B12     Vitamin C     Vitamin E     allergy med prn         OBJECTIVE:        Vitals:         Current: 7/12/2018 11:28:30 AM    Ht:  5 ft, 10 in;  Wt: 194.8 lbs;  BMI: 28.0    T: 97.3 F (oral);  BP: 129/87 mm Hg (left arm, sitting);  P: 72 bpm (left arm (BP Cuff), sitting);  sCr: 1.26 mg/dL;  GFR: 63.25    VA: 20/40 OD, 20/30 OS (near, with correction)        Repeat:     11:42:31 AM     VA:    (20/40 OD,  (near, with correction, , 20/30 OS, , Bilateral 20/30))         Exams:     PHYSICAL EXAM:     GENERAL:  well developed and nourished; appropriately groomed; in no apparent distress;     EYES: Nonicteric and with unremarkable lids, iris and pupils;     E/N/T: EARS:  normal external auditory canals and tympanic membranes;  grossly normal hearing; OROPHARYNX: oral mucosa is normal; totally edentulous; normal palate; normal tongue; posterior pharynx, including tonsils, tongue, and uvula are normal;     NECK: carotid exam reveals no bruits;     RESPIRATORY: normal respiratory rate and pattern with no distress; expiratory wheezes in the end of expiration, mild;     CARDIOVASCULAR: normal rate; rhythm is  regular;  a systolic murmur is noted: it is grade 2/6;     GASTROINTESTINAL: nontender, nondistended; no hepatosplenomegaly or masses; no bruits;     GENITOURINARY: prostate:  no nodules, tenderness, or enlargement;     LYMPHATIC: no enlargement of cervical or facial nodes;     MUSCULOSKELETAL: normal overall tone No pedal edema.;     NEUROLOGIC: No lateralizing deficit.;     PSYCHIATRIC:  appropriate affect and demeanor; normal speech pattern; grossly normal memory; He does have some Intellectual Disability.         ASSESSMENT           401.1   I10  Hypertension              DDx:     V70.0   Z00.00  Health checkup              DDx:     V79.0   Z13.89  Screening for depression              DDx:     396.0   I35.0  Aortic stenosis              DDx:     300.4   F34.9  Anxiety with depression              DDx:     V76.44   Z12.5  Screening for prostate cancer              DDx:         ORDERS:         Lab Orders:       *  PRSAS Medicare screening PSA  (Send-Out)         [id number]  [facility] CMP AND LIPID: [id number]  (Send-Out)           Procedures Ordered:       REFER  Referral to Specialist or Other Facility  (Send-Out)           Other Orders:         Subsequent Annual Well Visit Medicare (x1)                 PLAN:          Hypertension cont rx     LABORATORY:  Labs ordered to be performed today include HTN/Lipid Panel: CMP, Lipid.            Orders:       [id number]  [facility] CMP AND LIPID: [id number]  (Send-Out)             Patient Education Handouts:       [facility] Medication Compliance           Health checkup Reviewed preventive service recommendations and created individualized handout         REFERRALS:  Referral initiated to a gastroenterologist ( Dr Chichi Burns, [facility]; a colonoscopy ).            Orders:       REFER  Referral to Specialist or Other Facility  (Send-Out)            Screening for depression     MIPS Current diagnosis of depression and/or bipolar disorder          Aortic  stenosiswill need repeat ECHO in 4 yrs          Anxiety with depression cont Rx          Screening for prostate cancer     LABORATORY:  Labs ordered to be performed today include PSA.            Orders:       *  PRSAS Medicare screening PSA  (Send-Out)               CHARGE CAPTURE           **Please note: ICD descriptions below are intended for billing purposes only and may not represent clinical diagnoses**        Primary Diagnosis:         401.1 Hypertension            I10    Essential (primary) hypertension              Orders:          [id number] -25  Office/outpatient visit; established patient, level 4  (In-House)           V70.0 Health checkup            Z00.00    Encntr for general adult medical exam w/o abnormal findings              Orders:          [id number]   Preventive medicine, established patient, age 40-64 years  (In-House)                                           Subsequent Annual Well Visit Medicare (x1)         V79.0 Screening for depression            Z13.89    Encounter for screening for other disorder    396.0 Aortic stenosis            I35.0    Nonrheumatic aortic (valve) stenosis    300.4 Anxiety with depression            F34.9    Persistent mood [affective] disorder, unspecified    V76.44 Screening for prostate cancer            Z12.5    Encounter for screening for malignant neoplasm of prostate        ADDENDUMS:      ____________________________________    Addendum: 07/18/2018 10:24 AM - Kuldeep Winters         The  Welcome to     Medicare can be billed.        Date: 07/20/2018 09:44 AM    Author: Teodora Cerrato         Visit Note Faxed to:        Chichi Burns  (Gastroenterology); Number [phone]

## 2021-05-18 NOTE — PROGRESS NOTES
Chad Oneill 1960     Office/Outpatient Visit    Visit Date: Fri, Aug 23, 2019 01:44 pm    Provider: Kuldeep Winters MD (Assistant: Tess Smith MA)    Location: [facility]        Electronically signed by Kuldeep Winters MD on  08/23/2019 02:22:44 PM                             SUBJECTIVE:        CC:     Mr. Oneill is a 59 year old White male.  This is a follow-up visit.  medication refills         HPI:         Patient presents with essential hypertension, benign.  Mr. Oneill does not check his blood pressure other than at his clinic appointments.  He is tolerating the medication well without side effects.  Compliance with treatment has been poor; he has not been taking his medications due to he stopped Losartan/HCTZ when we started the Amlodipine due to misunderstanding.      He has a history of anxiety and depression.  Currently on Zoloft and he says it does pretty well for him.  He does not want to change the dosage.     ROS:     CONSTITUTIONAL:  Negative for chills, fatigue and fever.      CARDIOVASCULAR:  Negative for chest pain, orthopnea, paroxysmal nocturnal dyspnea and pedal edema.      RESPIRATORY:  Negative for dyspnea and cough.      GASTROINTESTINAL:  Negative for abdominal pain, heartburn, constipation, diarrhea, and stool changes.      GENITOURINARY:  Negative for dysuria and polyuria.      PSYCHIATRIC:  Negative for anxiety and depression.          [facility]/:     Last Reviewed on 7/12/2018 11:56 AM by Kuldeep Winters    Past Medical History:         Positive for    Asthma;         CURRENT MEDICAL PROVIDERS:    Cardiologist: Dr. Nogurea    Gastroenterologist: Dr. Shah    Urologist: Dr. Urbina         [facility]             COLORECTAL CANCER SCREENING: Up to date (colonoscopy q10y; sigmoidoscopy q5y; Cologuard q3y) was last done 11/2013, Results are in chart     PSA: was last done 3/14/2012 with normal results         Surgical History:          Arthroscopy: left knee;     Biopsy of colonic polyp    injury to right hand;    circumcision ; Procedures: colonoscopy  EGD 2013         Family History:     Father:      Mother: Coronary Artery Disease;  Pulmonary Embolism ( post op )     Brother(s): 1 brother(s) total;  Myocardial Infarction;  Prostate Cancer;  COPD     Son(s): Healthy; 1 son(s) total     Daughter(s): Healthy; 1 daughter(s) total     Paternal Grandfather:      Paternal Grandmother:      Maternal Grandfather: ;  Alcoholism     Maternal Grandmother: Cause of death was heart related         Social History:     Occupation: [employer] in [place];     Marital Status:      Children: 2 children         Tobacco/Alcohol/Supplements:     Last Reviewed on 2019 09:12 AM by Spurling, Sarah C    Tobacco: He has a past history of cigarette smoking; quit date:  .  Non-drinker         Substance Abuse History:     Last Reviewed on 2016 12:32 PM by Kiki Sanchez    None         Mental Health History:     Last Reviewed on 2016 12:32 PM by Kiki Sanchez        Communicable Diseases (eg STDs):     Last Reviewed on 2016 12:32 PM by Kiki Sanchez            Allergies:     Last Reviewed on 2019 09:12 AM by Spurling, Sarah C      No Known Drug Allergies.         Current Medications:     Last Reviewed on 2019 09:19 AM by Spurling, Sarah C    Amlodipine  5mg Tablet Take 1 tablet(s) by mouth daily     Zoloft 50mg Tablet 1 a day     Ventolin HFA 90mcg/1actuation Oral Inhaler Inhale 2 puff(s) by mouth 4 times a day as needed     Losartan/Hydrochlorothiazide 100mg/25mg Tablet Take 1 tablet(s) by mouth daily     Omeprazole 20mg Capsules, Extended Release Take 1 capsule(s) by mouth daily, prn     Vitamin B12     Vitamin C     Vitamin E     allergy med prn         OBJECTIVE:        Vitals:         Current: 2019 1:51:30 PM    Ht:  5 ft, 10 in;  Wt: 201.4 lbs;  BMI: 28.9    T: 98.3 F (oral);  BP:  144/79 mm Hg (left arm, sitting);  P: 66 bpm (left arm (BP Cuff), sitting);  sCr: 1.3 mg/dL;  GFR: 61.44        Repeat:     2:17:01 PM     BP:   170/90mm Hg (right arm, sitting, by stiles)         Exams:     PHYSICAL EXAM:     GENERAL:  well developed and nourished; appropriately groomed; in no apparent distress;     EYES: Nonicteric and with unremarkable lids, iris and pupils;     E/N/T:  normal EACs, TMs, nasal/oral mucosa, teeth, gingiva, and oropharynx;     NECK: carotid exam reveals no bruits;     RESPIRATORY: normal respiratory rate and pattern with no distress; normal breath sounds with no rales, rhonchi, wheezes or rubs;     CARDIOVASCULAR: normal rate; rhythm is regular;  no systolic murmur;     LYMPHATIC: no enlargement of cervical or facial nodes;     MUSCULOSKELETAL: normal overall tone No pedal edema.;     NEUROLOGIC: No lateralizing deficit.;     PSYCHIATRIC:  appropriate affect and demeanor; normal speech pattern; grossly normal memory;         ASSESSMENT           401.1   I10  Essential hypertension, benign              DDx:     300.4   F41.9  Anxiety with depression              DDx:         ORDERS:         Meds Prescribed:       Refill of: Amlodipine  5mg Tablet Take 1 tablet(s) by mouth daily  #90 (Ninety) tablet(s) Refills: 0       Refill of: Losartan/Hydrochlorothiazide 100mg/25mg Tablet Take 1 tablet(s) by mouth daily  #90 (Ninety) tablet(s) Refills: 0       Refill of: Zoloft (Sertraline HCl) 50mg Tablet 1 a day  #90 (Ninety) tablet(s) Refills: 0                 PLAN:          Essential hypertension, benign His blood pressure is running elevated but he has not been taking the losartan hydrochlorothiazide so I am just coming getting back on that again.           Prescriptions:       Refill of: Amlodipine  5mg Tablet Take 1 tablet(s) by mouth daily  #90 (Ninety) tablet(s) Refills: 0       Refill of: Losartan/Hydrochlorothiazide 100mg/25mg Tablet Take 1 tablet(s) by mouth daily  #90 (Ninety)  tablet(s) Refills: 0          Anxiety with depression           Prescriptions:       Refill of: Zoloft (Sertraline HCl) 50mg Tablet 1 a day  #90 (Ninety) tablet(s) Refills: 0             Patient Recommendations:    Dragon transcription disclaimer:        Much of this encounter note is an electronic transcription/translation of spoken language to printed text.  The electronic translation of spoken language may permit erroneous, or at times, nonsensical words or phrases to be inadvertently transcribed.  Although I have reviewed the note for such errors, some may still exist.             CHARGE CAPTURE           **Please note: ICD descriptions below are intended for billing purposes only and may not represent clinical diagnoses**        Primary Diagnosis:         401.1 Essential hypertension, benign            I10    Essential (primary) hypertension              Orders:          [id number]   Office/outpatient visit; established patient, level 3  (In-House)           300.4 Anxiety with depression            F41.9    Anxiety disorder, unspecified

## 2021-05-18 NOTE — PROGRESS NOTES
"Chad Oneill  1960     Office/Outpatient Visit    Visit Date: Thu, Mar 26, 2020 11:22 am    Provider: Kuldeep Winters MD (Assistant: Bri Glaser, )    Location: [facility]        Electronically signed by Kuldeep Winters MD on  05/19/2020 03:42:17 PM                             Subjective:        CC: Mr. Oneill is a 60 year old White male.  presents today due to asthma and bronchitis         HPI:       Two or three weeks been laying around and coughing up some white sputum.  Feeling a little better now.  He took codeine cough syrup that was left over from visit to hospital. Not sure if he has had fever, he thinks he may have felt hot but he thought bc his BP was up. He says that he has been staying at home mostly, only going out to store.  His wife is well, and she stays home. No n/v or diarrhea.  He has Albuterol nebs that he just started using in last couple days and has helped. He is feeling better today, like his power is coming back. He did stop using the wood stove once he \"got bronchitis,\"  because he thought smoke was getting into the house. Has had allergies in Spring in past.  He does ask if he \"got that new virus would it kill me flat out.\"    ROS:     CONSTITUTIONAL:  Negative for chills, fatigue and fever.      CARDIOVASCULAR:  Negative for chest pain, orthopnea, paroxysmal nocturnal dyspnea and pedal edema.      RESPIRATORY:  Positive for cough.   Negative for dyspnea.      GASTROINTESTINAL:  Negative for abdominal pain, heartburn, constipation, diarrhea, and stool changes.      GENITOURINARY:  Negative for dysuria and polyuria.      PSYCHIATRIC:  Negative for anxiety and depression.          Past Medical History / Family History / Social History:         Last Reviewed on 2/24/2020 12:18 PM by Kuldeep Winters    Past Medical History:         Positive for    Asthma;         CURRENT MEDICAL PROVIDERS:    Cardiologist: Dr. Noguera    Gastroenterologist: Dr." Pablo    Urologist: Dr. Urbina         PREVENTIVE HEALTH MAINTENANCE             COLORECTAL CANCER SCREENING: Up to date (colonoscopy q10y; sigmoidoscopy q5y; Cologuard q3y) was last done 2013, Results are in chart     PSA: was last done 18 with normal results         Surgical History:         Arthroscopy: left knee;     Biopsy of colonic polyp    injury to right hand;    circumcision ; Procedures: colonoscopy  EGD 2013         Family History:     Father:      Mother: Coronary Artery Disease;  Pulmonary Embolism ( post op )     Brother(s): 1 brother(s) total;  Myocardial Infarction;  Prostate Cancer;  COPD     Son(s): Healthy; 1 son(s) total     Daughter(s): Healthy; 1 daughter(s) total     Paternal Grandfather:      Paternal Grandmother:      Maternal Grandfather: ;  Alcoholism     Maternal Grandmother: Cause of death was heart related         Social History:     Occupation: [employer] in [place];     Marital Status:      Children: 2 children         Tobacco/Alcohol/Supplements:     Last Reviewed on 2019 01:48 PM by Tess Smith    Tobacco: He has a past history of cigarette smoking; quit date:  .  Non-drinker         Substance Abuse History:     Last Reviewed on 2016 12:32 PM by Kiki Sanchez    None         Mental Health History:     Last Reviewed on 2016 12:32 PM by Kiki Sanchez        Communicable Diseases (eg STDs):     Last Reviewed on 2016 12:32 PM by Kiki Sanchez        Allergies:     Last Reviewed on 2020 11:19 AM by Tess Smith    No Known Allergies.        Current Medications:     Last Reviewed on 2020 11:30 AM by Tess Smith    Omeprazole 20mg Capsules, Extended Release [Take 1 capsule(s) by mouth daily, prn]    Ventolin HFA 90mcg/1actuation Oral Inhaler [Inhale 2 puff(s) by mouth 4 times a day as needed]    allergy med prn     losartan-hydrochlorothiazide 100-25 mg oral tablet [Take 1 tablet(s)  "by mouth daily]    Vitamin B12     Endur-C with santi hips     Vitamin E     Zoloft 50mg Tablet [1 a day]    amLODIPine 10 mg oral tablet [take 1 tablet (10 mg) by oral route once daily]    Advair Diskus 250-50 mcg/dose Inhalation Blister, With Inhalation Device [inhale 1 puff by inhalation route 2 times per day in the morning and evening approximately 12 hours apart]        Objective:        Exams: Telehealth visit via telephone.  Patient's voice sounded strong.  There is no evidence of respiratory issues, no cough or congestion audible.  Mental health seem to be good, but \"worried\".  Had good insight into the coronavirus issues.        Assessment:         J06.9   Acute upper respiratory infection, unspecified           Plan: We discussed issues related to coronavirus/Corvid-19 disease including the importance of social distancing, good hygiene, behaviors for visitors/family, safe grocery shopping practices, etc.  If does have any symptoms call us to discuss so we can help decide if needs to be seen or manage over phone.  Also advised to stay ahead on medication refills and have extra on hand.         Acute upper respiratory infection, unspecifiedFor now we will continue to treat symptomatically.  Does not seem to need another round of antibiotics.  Did recommend that if he uses the nebulizer breathing treatment he should do so outdoors.  If his symptoms persist or worsen he should let us know.  If he has difficulty with breathing he should go straight to the emergency room.    Telehealth: Verbal consent obtained for visit to occur via phone call; Staff, other than provider, present during telephone visit include none; Total time spent was 12 minutes; [id number]--[place] E/M 11-20 minutes             Charge Capture:         Primary Diagnosis:     J06.9  Acute upper respiratory infection, unspecified           Orders:      [id number]  Phys/QHP telephone evaluation 11-20 minutes  (In-House)                     "

## 2021-05-18 NOTE — PROGRESS NOTES
Chad Oniell  1960     Office/Outpatient Visit    Visit Date: Mon, Aug 24, 2020 09:10 am    Provider: Kuldeep Winters MD (Assistant: Kathi Gupta LPN)    Location: [facility]        Electronically signed by Kuldeep Winters MD on  08/24/2020 09:44:17 AM                             Subjective:        CC: Mr. Oneill is a 60 year old White male.  This is a follow-up visit.  Pt is not for sure if he is taking Amlodipine. Pt is taking Symbicort 160/4.5 and Bronkaid.          HPI:       Patient has a history of hypertension.  His blood pressure is noted to be elevated today.  Review of his medication bottles I see that he did not have his Amlodipine.He says he did not realize he was still supposed to be taking it.  He does not have way to measure his blood pressure at home.      He did see the pulmonologist and we reviewed Dr. Le's consultation note.  Had evidence of eosinophils reflective of inflammatory disease.  Was started on Symbicort and patient states has helped him quite a bit.  His cough is much improved.      Says he does have a history of episodic heartburn prior history of reflux.  With his asthma and symptomatology I think it would be worth him staying on the omeprazole.      On his exam his heart murmur fairly prominent today.  He does not have a history of chest pain, syncope or near syncope, shortness of breath beyond that explained that has asthma.His last echocardiogram was greater than 3 years ago.    ROS:     CONSTITUTIONAL:  Negative for chills, fatigue and fever.      CARDIOVASCULAR:  Negative for chest pain, orthopnea, paroxysmal nocturnal dyspnea and pedal edema.      RESPIRATORY:  Negative for dyspnea and cough.      GASTROINTESTINAL:  Negative for abdominal pain, heartburn, constipation, diarrhea, and stool changes.      GENITOURINARY:  Negative for dysuria and polyuria.      PSYCHIATRIC:  Negative for anxiety and depression.          Past Medical History / Family  History / Social History:         Last Reviewed on 2020 12:18 PM by Kuldeep Winters    Past Medical History:         Positive for    Asthma;         CURRENT MEDICAL PROVIDERS:    Cardiologist: Dr. Noguera    Gastroenterologist: Dr. Shah    Urologist: Dr. Urbina         PREVENTIVE HEALTH MAINTENANCE             COLORECTAL CANCER SCREENING: Up to date (colonoscopy q10y; sigmoidoscopy q5y; Cologuard q3y) was last done 2013, Results are in chart     PSA: was last done 18 with normal results         Surgical History:         Arthroscopy: left knee;     Biopsy of colonic polyp    injury to right hand;    circumcision ; Procedures: colonoscopy  EGD 2013         Family History:     Father:      Mother: Coronary Artery Disease;  Pulmonary Embolism ( post op )     Brother(s): 1 brother(s) total;  Myocardial Infarction;  Prostate Cancer;  COPD     Son(s): Healthy; 1 son(s) total     Daughter(s): Healthy; 1 daughter(s) total     Paternal Grandfather:      Paternal Grandmother:      Maternal Grandfather: ;  Alcoholism     Maternal Grandmother: Cause of death was heart related         Social History:     Occupation: [employer] in [place];     Marital Status:      Children: 2 children         Tobacco/Alcohol/Supplements:     Last Reviewed on 2020 02:31 PM by Arline Duncan    Tobacco: He has a past history of cigarette smoking; quit date:  .  Non-drinker         Substance Abuse History:     Last Reviewed on 2016 12:32 PM by Kiki Sanchez    None         Mental Health History:     Last Reviewed on 2016 12:32 PM by Kiki Sanchez        Communicable Diseases (eg STDs):     Last Reviewed on 2016 12:32 PM by Kiki Sanchez        Allergies:     Last Reviewed on 2020 09:16 AM by Kathi Gupta    No Known Allergies.        Current Medications:     Last Reviewed on 2020 02:31 PM by Arline Duncan    Omeprazole 20mg Capsules, Extended Release [Take  "1 capsule(s) by mouth daily, prn]    Ventolin HFA 90 mcg/actuation Inhalation HFA Aerosol Inhaler [Inhale 2 puff(s) by mouth q4 hrs  as needed for cough, wheeze, shortness of breath. Seek medical attention if symptoms are not relieved ]    losartan-hydrochlorothiazide 100-25 mg oral tablet [Take 1 tablet by mouth once daily]    amLODIPine 10 mg oral tablet [take 1 tablet (10 mg) by oral route once daily]        Objective:        Vitals:         Current: 8/24/2020 9:16:16 AM    Ht:  5 ft, 10 in;  Wt: 208.8 lbs;  BMI: 30.0T: 98.2 F (oral);  BP: 171/91 mm Hg (left arm, sitting);  P: 71 bpm (left arm (BP Cuff), sitting);  sCr: 1.73 mg/dL;  GFR: 46.31        Repeat:     9:22:2 AM  BP:   167/89mm Hg (left arm, sitting, 10 minutes later) 9:22:17 AM  P:   67bpm (left arm (BP Cuff), sitting)     Exams:     PHYSICAL EXAM:     GENERAL:  well developed and nourished; appropriately groomed; in no apparent distress;     EYES: Nonicteric and with unremarkable lids, iris and pupils;     E/N/T: EARS:  normal external auditory canals and tympanic membranes;  grossly normal hearing; OROPHARYNX: oral mucosa is normal; totally edentulous; normal palate; normal tongue; posterior pharynx, including tonsils, tongue, and uvula are normal;     NECK: carotid exam reveals no bruits;     RESPIRATORY: normal respiratory rate and pattern with no distress; no rales (\"crackles\") present; no wheezes;     CARDIOVASCULAR: normal rate; rhythm is regular;  a systolic murmur is noted: it is grade 4/6;     GASTROINTESTINAL: nontender, nondistended; no hepatosplenomegaly or masses; no bruits;     GENITOURINARY: prostate:  no nodules, tenderness, or enlargement;     LYMPHATIC: no enlargement of cervical or facial nodes;     MUSCULOSKELETAL: normal overall tone No pedal edema.;     NEUROLOGIC: No lateralizing deficit.;     PSYCHIATRIC:  appropriate affect and demeanor; normal speech pattern; grossly normal memory;         Assessment:         I10   Essential " (primary) hypertension       J45.20   Mild intermittent asthma, uncomplicated       R12   Heartburn       I35.0   Nonrheumatic aortic (valve) stenosis           ORDERS:         Meds Prescribed:       [Refilled] amLODIPine 10 mg oral tablet [take 1 tablet (10 mg) by oral route once daily], #30 (thirty) tablets, Refills: 2 (two)       [Refilled] losartan-hydrochlorothiazide 100-25 mg oral tablet [Take 1 tablet by mouth once daily], #90 (ninety) tablets, Refills: 0 (zero)       [Refilled] Omeprazole 20 mg oral capsule,delayed release (enteric coated) [Take 1 capsule(s) by mouth daily, prn], #30 (thirty) capsules, Refills: 1 (one)         Radiology/Test Orders:       [id number]  Echocardiography, transthoracic, real-time w image (2D), w M-mode, w spectral & color flow Doppler  (Send-Out)                      Plan: He had recent lab work prior to his colonoscopy which we reviewed today.        Essential (primary) hypertensionBlood pressures probably elevated because he is not been taking the amlodipine.Get hiim back on Amlodipine          Prescriptions:       [Refilled] amLODIPine 10 mg oral tablet [take 1 tablet (10 mg) by oral route once daily], #30 (thirty) tablets, Refills: 2 (two)       [Refilled] losartan-hydrochlorothiazide 100-25 mg oral tablet [Take 1 tablet by mouth once daily], #90 (ninety) tablets, Refills: 0 (zero)         Mild intermittent asthma, uncomplicatedSeems to have done really well with the Symbicort he should stay on the medication as directed and follow-up with pulmonology as directed        Heartburn          Prescriptions:       [Refilled] Omeprazole 20 mg oral capsule,delayed release (enteric coated) [Take 1 capsule(s) by mouth daily, prn], #30 (thirty) capsules, Refills: 1 (one)         Nonrheumatic aortic (valve) stenosisSpent about 3 years since his last echocardiogram.  I will go ahead and get that scheduled for him for follow-up of his aortic stenosis.        TESTS/PROCEDURES:  Will proceed  with ECHO to be performed/scheduled now.            Orders:       [id number]  Echocardiography, transthoracic, real-time w image (2D), w M-mode, w spectral & color flow Doppler  (Send-Out)                  Other Patient Education Handouts:     Dragon transcription disclaimer:        Much of this encounter note is an electronic transcription/translation of spoken language to printed text.  The electronic translation of spoken language may permit erroneous, or at times, nonsensical words or phrases to be inadvertently transcribed.  Although I have reviewed the note for such errors, some may still exist.        Charge Capture:         Primary Diagnosis:     I10  Essential (primary) hypertension           Orders:      [id number]  Office/outpatient visit; established patient, level 4  (In-House)              J45.20  Mild intermittent asthma, uncomplicated     R12  Heartburn     I35.0  Nonrheumatic aortic (valve) stenosis

## 2021-05-18 NOTE — PROGRESS NOTES
"Chad Oneill  1960     Office/Outpatient Visit    Visit Date: Mon, Jun 1, 2020 02:30 pm    Provider: Kuldeep Winters MD (Assistant: Arline Duncan MA)    Location: [facility]        Electronically signed by Kuldeep Winters MD on  06/01/2020 03:15:29 PM                             Subjective:        CC: pt states he is not taking zoloft or omeprazole Mr. Oneill is a 60 year old White male.  cough and soa         HPI:       Mr. Oneill evidently was seen Dr. Vazquez's office get arranged for his colonoscopy.  He was reporting to him that he had a cough and \"bronchitis\" and was wanting a prescription.  He was therefore sent here for evaluation.  He has not had any fever.  He has been using his albuterol on a frequent basis.  Sometimes as often as every hour he says.  Explained to him the dangers of using albuterol more than every 4 hours.  Explained that if he thought he needed more frequent treatments that he should be evaluated either here or in the emergency room as that would be a sign that he is respiratory status was \"out of control\".He has not had any GI symptoms.  No loss of taste or smell.No one else at home is sickHe has not had any exposures covert that he is aware of    ROS:     CONSTITUTIONAL:  Negative for chills, fatigue and fever.      CARDIOVASCULAR:  Negative for chest pain, orthopnea, paroxysmal nocturnal dyspnea and pedal edema.      RESPIRATORY:  Positive for dyspnea and cough.      GASTROINTESTINAL:  Negative for abdominal pain, heartburn, constipation, diarrhea, and stool changes.      GENITOURINARY:  Negative for dysuria and polyuria.      PSYCHIATRIC:  Negative for anxiety and depression.          Past Medical History / Family History / Social History:         Last Reviewed on 2/24/2020 12:18 PM by Kuldeep Winters    Past Medical History:         Positive for    Asthma;         CURRENT MEDICAL PROVIDERS:    Cardiologist: Dr. Noguera    Gastroenterologist: Dr." Pablo    Urologist: Dr. Urbina         PREVENTIVE HEALTH MAINTENANCE             COLORECTAL CANCER SCREENING: Up to date (colonoscopy q10y; sigmoidoscopy q5y; Cologuard q3y) was last done 2013, Results are in chart     PSA: was last done 18 with normal results         Surgical History:         Arthroscopy: left knee;     Biopsy of colonic polyp    injury to right hand;    circumcision ; Procedures: colonoscopy  EGD 2013         Family History:     Father:      Mother: Coronary Artery Disease;  Pulmonary Embolism ( post op )     Brother(s): 1 brother(s) total;  Myocardial Infarction;  Prostate Cancer;  COPD     Son(s): Healthy; 1 son(s) total     Daughter(s): Healthy; 1 daughter(s) total     Paternal Grandfather:      Paternal Grandmother:      Maternal Grandfather: ;  Alcoholism     Maternal Grandmother: Cause of death was heart related         Social History:     Occupation: [employer] in [place];     Marital Status:      Children: 2 children         Tobacco/Alcohol/Supplements:     Last Reviewed on 3/26/2020 11:23 AM by Bri Glaser    Tobacco: He has a past history of cigarette smoking; quit date:  .  Non-drinker         Substance Abuse History:     Last Reviewed on 2016 12:32 PM by Kiki Sanchez    None         Mental Health History:     Last Reviewed on 2016 12:32 PM by Kiki Sanchez        Communicable Diseases (eg STDs):     Last Reviewed on 2016 12:32 PM by Kiki Sanchez        Allergies:     Last Reviewed on 2020 02:31 PM by Arline Duncan    No Known Allergies.        Current Medications:     Last Reviewed on 2020 02:31 PM by Arline Duncan    Omeprazole 20mg Capsules, Extended Release [Take 1 capsule(s) by mouth daily, prn]    Ventolin HFA 90mcg/1actuation Oral Inhaler [Inhale 2 puff(s) by mouth 4 times a day as needed]    losartan-hydrochlorothiazide 100-25 mg oral tablet [Take 1 tablet(s) by mouth daily]    Zoloft  50mg Tablet [1 a day]    amLODIPine 10 mg oral tablet [take 1 tablet (10 mg) by oral route once daily]        Objective:        Vitals:         Current: 6/1/2020 2:38:47 PM    Ht:  5 ft, 10 in;  Wt: 202.8 lbs;  BMI: 29.1T: 97.9 F (oral);  BP: 127/81 mm Hg (left arm, sitting);  P: 83 bpm (left arm (BP Cuff), sitting);  sCr: 1.73 mg/dL;  GFR: 45.74        Exams:     PHYSICAL EXAM:     GENERAL:  well developed and nourished; appropriately groomed; in no apparent distress; No respiratory distress at all at this time    EYES: Nonicteric and with unremarkable lids, iris and pupils;     E/N/T:  normal EACs, TMs, nasal/oral mucosa, teeth, gingiva, and oropharynx;     NECK: carotid exam reveals no bruits;     RESPIRATORY: normal respiratory rate and pattern with no distress; diffuse expiratory wheezes; Does have good air movement.    CARDIOVASCULAR: normal rate; rhythm is regular;  no systolic murmur;     LYMPHATIC: no enlargement of cervical or facial nodes;     MUSCULOSKELETAL: normal overall tone No pedal edema.;     NEUROLOGIC: No lateralizing deficit.;     PSYCHIATRIC:  appropriate affect and demeanor; normal speech pattern; grossly normal memory;         Assessment:         J20.9   Acute bronchitis, unspecified       J45.21   Mild intermittent asthma with (acute) exacerbation       R05   Cough           ORDERS:         Meds Prescribed:       [New Rx] Zithromax Z-Juan Pablo 250 mg oral tablet [take 2 initially then 1 tablet (250 mg) by oral route once daily], #6 (six) tablets, Refills: 0 (zero)       [New Rx] predniSONE 5 mg oral tablet [Take 8 tabs initially, then taper by one tablet po daily until gone], #36 (thirty six) tablets, Refills: 0 (zero)       [Refilled] Ventolin HFA 90 mcg/actuation Inhalation HFA Aerosol Inhaler [Inhale 2 puff(s) by mouth q4 hrs  as needed for cough, wheeze, shortness of breath. Seek medical attention if symptoms are not relieved ], #1 (one) each, Refills: 0 (zero)         Radiology/Test Orders:        [id number]  COVID 19 Testing  (Send-Out)                      Plan:         Acute bronchitis, unspecifiedMost likely he does have just a flareup of his asthma,  perhaps with bronchitis.  However given her current environment he does need to be tested for COVID.In the meantime I will go ahead and treat him with Z-Juan Pablo steroids.  If he has worsening shortness of breath needs to go to the emergency room.  Again stressed to him that if he feels that he is every 4 hours albuterol is not adequate he needs to be reevaluated          Prescriptions:       [New Rx] Zithromax Z-Juan Pablo 250 mg oral tablet [take 2 initially then 1 tablet (250 mg) by oral route once daily], #6 (six) tablets, Refills: 0 (zero)       [New Rx] predniSONE 5 mg oral tablet [Take 8 tabs initially, then taper by one tablet po daily until gone], #36 (thirty six) tablets, Refills: 0 (zero)         Mild intermittent asthma with (acute) exacerbationAs above          Prescriptions:       [Refilled] Ventolin HFA 90 mcg/actuation Inhalation HFA Aerosol Inhaler [Inhale 2 puff(s) by mouth q4 hrs  as needed for cough, wheeze, shortness of breath. Seek medical attention if symptoms are not relieved ], #1 (one) each, Refills: 0 (zero)         Cough    LABORATORY:  Labs ordered to be performed today include COVID 19 Testing.            Orders:       [id number]  COVID 19 Testing  (Send-Out)                  Charge Capture:         Primary Diagnosis:     J20.9  Acute bronchitis, unspecified           Orders:      [id number]  Office/outpatient visit; established patient, level 3  (In-House)              J45.21  Mild intermittent asthma with (acute) exacerbation     R05  Cough

## 2021-05-22 ENCOUNTER — TRANSCRIBE ORDERS (OUTPATIENT)
Dept: ADMINISTRATIVE | Facility: HOSPITAL | Age: 61
End: 2021-05-22

## 2021-05-22 DIAGNOSIS — R91.1 PULMONARY NODULE: Primary | ICD-10-CM

## 2021-05-28 VITALS
SYSTOLIC BLOOD PRESSURE: 137 MMHG | RESPIRATION RATE: 14 BRPM | WEIGHT: 207 LBS | HEIGHT: 69 IN | TEMPERATURE: 98 F | OXYGEN SATURATION: 98 % | HEART RATE: 77 BPM | BODY MASS INDEX: 30.66 KG/M2 | DIASTOLIC BLOOD PRESSURE: 84 MMHG

## 2021-05-28 VITALS
OXYGEN SATURATION: 97 % | BODY MASS INDEX: 30.56 KG/M2 | WEIGHT: 206.31 LBS | HEART RATE: 78 BPM | HEIGHT: 69 IN | RESPIRATION RATE: 18 BRPM | TEMPERATURE: 98 F | SYSTOLIC BLOOD PRESSURE: 129 MMHG | DIASTOLIC BLOOD PRESSURE: 91 MMHG

## 2021-05-28 NOTE — PROGRESS NOTES
Patient: AMANDA VIDAL     Acct: [id number]     Report: #[id number]  UNIT #: [id number]     : 1960    Encounter Date:2020  PRIMARY CARE: LARS MURRELL  ***Signed***  --------------------------------------------------------------------------------------------------------------------  Chief Complaint      Encounter Date      Sep 24, 2020            Primary Care Provider      LARS MURRELL            Referring Provider      LARS MURRELL            Patient Complaint      Patient is complaining of      PT here today for F/U, COPD            VITALS      Height 5 ft 9 in / 175.26 cm      Weight 206 lbs 5 oz / 93.167028 kg      BSA 2.09 m2      BMI 30.5 kg/m2      Temperature 98 F / 36.67 C - Tympanic      Pulse 78      Respirations 18      Blood Pressure 129/91 Sitting, Left Arm      Pulse Oximetry 97%, room air      Initial Exhaled Nitrous Oxide      Date:  2020            HPI      The patient is a very pleasant 60 year old  male former cigarette     smoker here for follow up.  He refused PFTs as he refused to be COVID19 tested     to have it done.  CAT scan was done showing 5 mm lung nodule that needs follow     up in one year. He does have some mild emphysema.  CBC shows severe peripheral     eosinophilia and had an elevated IgE of 65. On Symbicort he has had a complete     resolution of symptoms. His findings are consistent with asthma. He denies any     dyspnea, cough, wheeze, headaches, chest pain or hemoptysis. Denies any nausea,     vomiting, fevers, chest pain or weakness.  He is otherwise doing well. He is     able to perform ADLs without difficulty.  Denies any swollen glands or lymph     nodes of the head and neck.            I have personally reviewed the review of systems, past family, social, surgical     and medical histories and I agree with the findings.            ROS      Constitutional:  Denies: Fatigue, Fever, Weight gain, Weight loss, Chills,     Insomnia,  "Other      Respiratory/Breathing:  Denies: Shortness of air, Wheezing, Cough, Hemoptysis,     Pleuritic pain, Other      Endocrine:  Denies: Polydipsia, Polyuria, Heat/cold intolerance, Diabetes, Other      Eyes:  Denies: Blurred vision, Vision Changes, Other      Ears, nose, mouth, throat:  Denies: Congestion, Dysphagia, Hearing Changes, Nose    Bleeding, Nasal Discharge, Throat pain, Tinnitus, Other      Cardiovascular:  Denies: Chest Pain, Exertional dyspnea, Peripheral Edema,     Palpitations, Syncope, Wake up Gasping for air, Orthopnea, Tachycardia, Other      Gastrointestinal:  Denies: Abdominal pain/cramping, Bloody stools, Constipation,    Diarrhea, Melena, Nausea, Vomiting, Other      Genitourinary:  Denies: Dysuria, Urinary frequency, Incontinence, Hematuria,     Urgency, Other      Musculoskeletal:  Denies: Joint Pain, Joint Stiffness, Joint Swelling, Myalgias,    Other      Hematologic/lymphatic:  DENIES: Lymphadenopathy, Bruising, Bleeding tendencies,     Other      Neurologic:  Denies: Headache, Numbness, Weakness, Seizures, Other      Psychiatric:  Denies: Anxiety, Appropriate Effect, Depression, Other      Sleep:  No: Excessive daytime sleep, Morning Headache?, Snoring, Insomnia?, Stop    breathing at sleep?, Other      Integumentary:  Denies: Rash, Dry skin, Skin Warm to Touch, Other            FAMILY/SOCIAL/MEDICAL HX      Surgical History:  Yes: Oral Surgery (FULL MOUTH DENTAL EXTRATION), Orthopedic     Surgery (LEFT \"KNEE CAP LASER \" SURGERY)      Heart - Family Hx:  Brother      Is Father Still Living?:  No      Is Mother Still Living?:  No      Social History:  No Tobacco Use, No Alcohol Use, No Recreational Drug use      Smoking status:  Former smoker (quit 1980, carton a week x 20 years)      Anticoagulation Therapy:  No      Antibiotic Prophylaxis:  No      Medical History:  Yes: Asthma (SEASONAL-NO INHALER USE 10 MONTHS),     Hemorrhoids/Rectal Prob (GERD-MED CONTROLS), High Blood Pressure " "(MED CONTROLS),    Shortness Of Breath; No: Blood Disease, Chemotherapy/Cancer, Deafness or Ringing    Ears, Sinus Trouble, Miscellaneous Medical/oth      Psychiatric History      NONE            PREVENTION      Hx Influenza Vaccination:  Yes      Date Influenza Vaccine Given:  Sep 1, 2020      Influenza Vaccine Declined:  No      2 or More Falls in Past Year?:  No      Fall Past Year with Injury?:  No      Hx Pneumococcal Vaccination:  Yes      Encouraged to follow-up with:  PCP regarding preventative exams.      Chart initiated by      Lynnette Rasmussen CMA            ALLERGIES/MEDICATIONS      Allergies:        Uncoded Allergies:             ARTHRITIS MEDS (Allergy, Unknown, STATES \"KIDNEY LEVELS HIGH\", 6/15/11)      Medications    Last Reconciled on 9/24/20 15:18 by MICKEY RUEDA MD      Budesonide/Formoterol Fumarate (Symbicort 160/4.5 Mcg) 10.2 Gm Inh      2 PUFF INH BID, #1 INH 6 Refills         Prov: MICKEY RUEDA         7/2/20       Sertraline HCl (Sertraline*) 50 Mg Tablet      50 MG PO QDAY, #30 TAB 0 Refills         Reported         7/2/20       Losartan/Hydrochlorothiazide (Losartan/Hctz 100/25 Mg) 1 Each Tablet      1 TAB PO QDAY, #30 TAB 0 Refills         Reported         7/2/20       MDI-Albuterol (Ventolin HFA) 8 Gm Hfa.aer.ad      2 PUFFS INH Q6H PRN for SHORTNESS OF BREATH, #1 MDI 0 Refills         Reported         7/2/20       amLODIPine (amLODIPine) 10 Mg Tablet      1 TAB PO QDAY@19         Reported         6/15/11       Omeprazole (Omeprazole*) 20 Mg Capcr      1 TAB PO QDAY         Reported         6/15/11      Current Medications      Current Medications Reviewed 9/24/20            EXAM      Vital Signs Reviewed.      General:  WDWN, Alert, NAD.      HEENT: PERRL, EOMI.  OP, nares clear, no sinus tenderness.      Neck: Supple, no JVD, no thyromegaly.      Chest: Good aeration, clear to auscultation bilaterally, tympanic to percussion     bilaterally, no work of breathing noted.      CV: RRR, no " MGR, pulses 2+, equal.        Abd: Soft, NT, ND, +BS, no HSM.      EXT: No clubbing, no cyanosis, no edema.       Neuro:  A  Skin: No rashes or lesions.      Vitals      Vitals:             Height 5 ft 9 in / 175.26 cm           Weight 206 lbs 5 oz / 93.174717 kg           BSA 2.09 m2           BMI 30.5 kg/m2           Temperature 98 F / 36.67 C - Tympanic           Pulse 78           Respirations 18           Blood Pressure 129/91 Sitting, Left Arm           Pulse Oximetry 97%, room air            REVIEW      Results Reviewed      PCCS Results Reviewed?:  Yes Prev Lab Results, Yes Prev Radiology Results, Yes     Previous [facility] Records      Lab Results      I personally reviewed my last office visit note. I personally reviewed     noncontrast chest CT done in 07/2020.  I personally reviewed labs showing a CBC     with 740 peripheral eosinophils and no evidence of chronic hypercapnic     respiratory failure. His IgE was elevated at 65.            Assessment      Solitary lung nodule - R91.1            Notes      Discontinued Medications      * predniSONE (Deltasone) 10 MG TABLET: 10 MG PO ASDIR #45         Instructions: [id number],42gmp1s,31rzo9k,83ith3p,15ijg6h         Dx: STARK (dyspnea on exertion) - R06.09      New Diagnostics      * Chest W/O Cont CT, Year         Dx: Solitary lung nodule - R91.1      IMPRESSION:      1.  5 mm solitary lung nodule right upper lobe.  Will need follow up on this     high risk patient.      2. Dyspnea, markedly improved.      3. Cough, resolved.      4. Wheezing, resolved.      5.  Emphysema.  Likely does have some COPD versus asthma. The patient refuses     PFTs at this time.  His largest component is asthma and has a component of     overlap syndrome. We will continue Symbicort for now as his asthma control test     score is 24.      6. Tobacco abuse with cigarettes in remission.      7. Peripheral eosinophilia.               PLAN:      1.  Continue Symbicort 160/4.5 two puffs twice  a day with albuterol as needed.      2.  Repeat noncontrast chest CT in one year to follow up 5 mm lung nodules in     this high risk patient.      3. I offered PFTs, but he refuses COVID19 testing and will not have PFTs done     until the pandemic ends per his prospective.      4. He is up-to-date with flu vaccine and Pneumovax, no indication for Prevnar.      5. Follow up in one year with pulmonary function tests.            Electronically signed by MICKEY RUEDA  09/28/2020 08:58       Disclaimer: Converted document may not contain table formatting or lab diagrams. Please see [employer] for the authenticated document.

## 2021-05-28 NOTE — PROGRESS NOTES
Patient: AMANDA VIDAL     Acct: [id number]     Report: #[id number]  UNIT #: [id number]     : 1960    Encounter Date:2020  PRIMARY CARE: LARS MURRELL  ***Signed***  --------------------------------------------------------------------------------------------------------------------  Chief Complaint      Encounter Date      2020            Primary Care Provider      LARS MURRELL            Referring Provider      LARS MURRELL            Patient Complaint      Patient is complaining of      NEW PT ASTHMA            VITALS      Height 5 ft 9 in / 175.26 cm      Weight 207 lbs  / 93.986737 kg      BSA 2.10 m2      BMI 30.6 kg/m2      Temperature 98 F / 36.67 C - Temporal      Pulse 77      Respirations 14      Blood Pressure 137/84 Sitting, Right Arm      Pulse Oximetry 98%, room air      Initial Exhaled Nitrous Oxide      Date:  2020      Exhaled Nitrous Oxide Results:  75            HPI      The patient is a 60 year old remote former cigarette smoker who quit smoking     about 5-6 years ago with a history of recurrent bronchitis here for evaluation.     He reports multiple times a year having bronchitis with frequent bouts of     coughing productive of thick yellow sputum, nonstop wheezing and shortness of     breath.  The patient does state that he is on albuterol which he takes     occasionally to help.  He gets steroids and antibiotics multiple times a year     with resolution of symptoms.  He is asking for prednisone and antibiotics today.    He has never been on an inhaled steroid.  He has never been told he has asthma     or COPD.  He denies any itchy-scratchy throat, watery eyes or nasal congestion.     Right now he gets short of breath walking about 100-200 feet, severe in     severity, worse with exertion and relieved with rest. He reports a cough of     thick yellow sputum and nonstop wheezing.  He has had chest x-[facility] at [facility] that show no real  abnormalities.  He has never been told he has asthma     or COPD. He does have a wood burning stove and is constantly burning wood and     states that makes his respiratory symptoms markedly worse.  He is able to     perform ADLs without difficulty.  Denies any swollen glands or lymph nodes of     the head and neck.            I have personally reviewed the review of systems, past family, social, surgical     and medical histories and I agree with the findings.            ROS      Constitutional:  Denies: Fatigue, Fever, Weight gain, Weight loss, Chills,     Insomnia, Other      Respiratory/Breathing:  Complains of: Shortness of air, Cough; Denies: Wheezing,    Hemoptysis, Pleuritic pain, Other      Endocrine:  Denies: Polydipsia, Polyuria, Heat/cold intolerance, Diabetes, Other      Eyes:  Denies: Blurred vision, Vision Changes, Other      Ears, nose, mouth, throat:  Denies: Mouth lesions, Thrush, Throat pain,     Hoarseness, Allergies/Hay Fever, Post Nasal Drip, Headaches, Recent Head Injury,    Nose Bleeding, Neck Stiffness, Thyroid Mass, Hearing Loss, Ear Fullness, Dry     Mouth, Nasal or Sinus Pain, Dry Lips, Nasal discharge, Nasal congestion, Other      Cardiovascular:  Denies: Palpitations, Syncope, Claudication, Chest Pain, Wake     up Gasping for air, Leg Swelling, Irregular Heart Rate, Cyanosis, Dyspnea on     Exertion, Other      Gastrointestinal:  Denies: Nausea, Constipation, Diarrhea, Abdominal pain, Vomit    ing, Difficulty Swallowing, Reflux/Heartburn, Dysphagia, Jaundice, Bloating,     Melena, Bloody stools, Other      Genitourinary:  Denies: Urinary frequency, Incontinence, Hematuria, Urgency,     Nocturia, Dysuria, Testicular problems, Other      Musculoskeletal:  Denies: Joint Pain, Joint Stiffness, Joint Swelling, Myalgias,    Other      Hematologic/lymphatic:  DENIES: Lymphadenopathy, Bruising, Bleeding tendencies,     Other      Neurological:  Denies: Headache, Numbness, Weakness, Seizures,  "Other      Psychiatric:  Denies: Anxiety, Appropriate Effect, Depression, Other      Sleep:  No: Excessive daytime sleep, Morning Headache?, Snoring, Insomnia?, Stop    breathing at sleep?, Other      Integumentary:  Denies: Rash, Dry skin, Skin Warm to Touch, Other      Immunologic/Allergic:  Denies: Latex allergy, Seasonal allergies, Asthma,     Urticaria, Eczema, Other      Immunization status:  No: Up to date            FAMILY/SOCIAL/MEDICAL HX      Surgical History:  Yes: Oral Surgery (FULL MOUTH DENTAL EXTRATION), Orthopedic     Surgery (LEFT \"KNEE CAP LASER \" SURGERY)      Heart - Family Hx:  Brother      Is Father Still Living?:  No      Is Mother Still Living?:  No      Smoking status:  Former smoker (smoker x15 yrs  quit 2000)      Anticoagulation Therapy:  No      Antibiotic Prophylaxis:  No      Medical History:  Yes: Asthma (SEASONAL-NO INHALER USE 10 MONTHS),     Hemorrhoids/Rectal Prob (GERD-MED CONTROLS), High Blood Pressure (MED CONTROLS),    Shortness Of Breath; No: Blood Disease, Chemotherapy/Cancer, Deafness or Ringing    Ears, Miscellaneous Medical/oth      Psychiatric History      NONE            PREVENTION      Hx Influenza Vaccination:  Yes      Date Influenza Vaccine Given:  Oct 1, 2019      Influenza Vaccine Declined:  No      2 or More Falls Past Year?:  No      Fall Past Year with Injury?:  No      Hx Pneumococcal Vaccination:  Yes      Encouraged to follow-up with:  PCP regarding preventative exams.      Chart initiated by      BILL WAGNER/ MA            ALLERGIES/MEDICATIONS      Allergies:        Uncoded Allergies:             ARTHRITIS MEDS (Allergy, Unknown, STATES \"KIDNEY LEVELS HIGH\", 6/15/11)      Medications    Last Reconciled on 7/2/20 11:38 by MICKEY RUEDA MD      Budesonide/Formoterol Fumarate (Symbicort 160/4.5 Mcg) 10.2 Gm Inh      2 PUFF INH BID, #1 INH 6 Refills         Prov: MICKEY RUEDA         7/2/20       predniSONE (Deltasone) 10 Mg Tablet      10 MG PO ASDIR, " #45 TAB 0 Refills         Prov: MICKEY RUEDA         7/2/20       Sertraline HCl (Sertraline*) 50 Mg Tablet      50 MG PO QDAY, #30 TAB 0 Refills         Reported         7/2/20       Losartan/Hydrochlorothiazide (Losartan/Hctz 100/25 Mg) 1 Each Tablet      1 TAB PO QDAY, #30 TAB 0 Refills         Reported         7/2/20       MDI-Albuterol (Ventolin HFA) 8 Gm Hfa.aer.ad      2 PUFFS INH Q6H PRN for SHORTNESS OF BREATH, #1 MDI 0 Refills         Reported         7/2/20       amLODIPine (amLODIPine) 10 Mg Tablet      1 TAB PO QDAY@19         Reported         6/15/11       Omeprazole (Omeprazole*) 20 Mg Capcr      1 TAB PO QDAY         Reported         6/15/11      Current Medications      Current Medications Reviewed 7/2/20            EXAM      Vital Signs Reviewed.      General:  Disheveled, alert, NAD.      HEENT: PERRL, EOMI.  OP, nares clear, no sinus tenderness.      Neck: Supple, no JVD, no thyromegaly.      Lymph: No axillary, cervical, supraclavicular lymphadenopathy noted bilaterally.      Chest: Good aeration, barrel chested, coarse rhonchi and wheezing throughout all    lung fields, tympanic to percussion bilaterally, no work of breathing noted.      CV: RRR, no MGR, pulses 2+, equal.        Abd: Soft, NT, ND, +BS, no HSM.      EXT: No clubbing, no cyanosis, no edema, no joint tenderness.        Neuro:  A  Skin: No rashes or lesions.      Vtials      Vitals:             Height 5 ft 9 in / 175.26 cm           Weight 207 lbs  / 93.657140 kg           BSA 2.10 m2           BMI 30.6 kg/m2           Temperature 98 F / 36.67 C - Temporal           Pulse 77           Respirations 14           Blood Pressure 137/84 Sitting, Right Arm           Pulse Oximetry 98%, room air            REVIEW      Results Reviewed      PCCS Results Reviewed?:  Yes Prev Lab Results, Yes Prev Radiology Results, Yes     Previous [facility] Records      Lab Results      I reviewed office notes from referring provider.  I reviewed a chest  x-ray from     [facility] which showed relatively clear lung fields. I did reveal labs     showing peripheral eosinophilia, last eosinophil count was 450. Labs in 02/2020     shows CKD with no evidence of chronic hypercapnic respiratory failure.            Assessment      STARK (dyspnea on exertion) - R06.09            Cough - R05            Wheeze - R06.2            Tobacco abuse, in remission - F17.201            Notes      New Medications      * MDI-Albuterol (Ventolin HFA) 8 GM HFA.AER.AD: 2 PUFFS INH Q6H PRN SHORTNESS OF      BREATH #1      * Losartan/Hydrochlorothiazide (Losartan/Hctz 100/25 Mg) 1 EACH TABLET: 1 TAB PO      QDAY #30      * Sertraline HCl (Sertraline*) 50 MG TABLET: 50 MG PO QDAY #30      * predniSONE (Deltasone) 10 MG TABLET: 10 MG PO ASDIR #45         Instructions: 17zvq3z,68rct8z,08rbi2w,41bnu5x,03kic5u         Dx: STARK (dyspnea on exertion) - R06.09      * Budesonide/Formoterol Fumarate (Symbicort 160/4.5 Mcg) 10.2 GM INH: 2 PUFF INH      BID #1         Dx: STARK (dyspnea on exertion) - R06.09      New Diagnostics      * PFT-Comp, PrePost,DLCO,BodyBox, Week         Dx: STARK (dyspnea on exertion) - R06.09      * Chest W/O Cont CT, SCHEDULED PROCEDURE         Dx: STARK (dyspnea on exertion) - R06.09      * CBC, Month         Dx: STARK (dyspnea on exertion) - R06.09      * Immunoglobulin  E (I, Week         Dx: STARK (dyspnea on exertion) - R06.09      IMPRESSION:      1.  Chronic dyspnea.      2.  Chronic cough.      3. Wheezing.      4. Recurrent bronchitis, I favor this patient has acute asthma that is poorly     controlled.  Worsening from wood burning stove and poorly controlled with     albuterol.  Would benefit from LABA ICS at this time.      5. Tobacco abuse with cigarettes in remission.      6. Obesity with BMI 30.6.               PLAN:      1.  I performed exhale nitric oxide testing in the office today.  Level was 75     indicative of a severe degree of eosinophilic airway inflammation  consistent     with likely asthma.      2.  We will give a two steroid taper.      3. Start Symbicort 160/4.5 two puffs twice a day.  Inhaler education and compl    iance discussed with the patient today. Continue albuterol as needed.      4. Check full PFTs to assess for airflow obstruction and bronchodilator     response.      5. Check CBC and IgE.      6.  Check noncontrast chest CT to assess for bronchiolitis as well as mucus     plugging, bronchiectasis and other etiologies that can lead to this     presentation.      7. Up-to-date with flu vaccine as well as Pneumovax, no indication for Prevnar.      8. Follow up in 2-3 months to reassess symptoms and discuss test results.            Patient Education      Patient Education Provided:  How to use an Inhaler            Electronically signed by MICKEY RUEDA  07/13/2020 08:28       Disclaimer: Converted document may not contain table formatting or lab diagrams. Please see "[employer] System for the authenticated document.

## 2021-06-30 DIAGNOSIS — R12 HEARTBURN: ICD-10-CM

## 2021-06-30 DIAGNOSIS — I10 HYPERTENSION, ESSENTIAL: Primary | ICD-10-CM

## 2021-06-30 RX ORDER — AMLODIPINE BESYLATE 10 MG/1
10 TABLET ORAL DAILY
COMMUNITY
End: 2021-09-20 | Stop reason: SDUPTHER

## 2021-06-30 RX ORDER — BUDESONIDE AND FORMOTEROL FUMARATE DIHYDRATE 160; 4.5 UG/1; UG/1
2 AEROSOL RESPIRATORY (INHALATION)
COMMUNITY
End: 2021-09-23 | Stop reason: SDUPTHER

## 2021-06-30 RX ORDER — LOSARTAN POTASSIUM AND HYDROCHLOROTHIAZIDE 25; 100 MG/1; MG/1
TABLET ORAL
Qty: 90 TABLET | Refills: 0 | Status: SHIPPED | OUTPATIENT
Start: 2021-06-30 | End: 2021-11-29

## 2021-06-30 RX ORDER — OMEPRAZOLE 20 MG/1
20 CAPSULE, DELAYED RELEASE ORAL DAILY PRN
COMMUNITY
End: 2021-06-30 | Stop reason: SDUPTHER

## 2021-06-30 RX ORDER — LOSARTAN POTASSIUM AND HYDROCHLOROTHIAZIDE 25; 100 MG/1; MG/1
1 TABLET ORAL DAILY
COMMUNITY
End: 2021-06-30 | Stop reason: SDUPTHER

## 2021-06-30 RX ORDER — OMEPRAZOLE 20 MG/1
CAPSULE, DELAYED RELEASE ORAL
Qty: 90 CAPSULE | Refills: 0 | Status: SHIPPED | OUTPATIENT
Start: 2021-06-30 | End: 2021-11-01

## 2021-06-30 RX ORDER — ALBUTEROL SULFATE 90 UG/1
2 AEROSOL, METERED RESPIRATORY (INHALATION) EVERY 4 HOURS PRN
COMMUNITY
End: 2021-09-23 | Stop reason: SDUPTHER

## 2021-06-30 RX ORDER — AMLODIPINE BESYLATE 10 MG/1
TABLET ORAL
Qty: 90 TABLET | Refills: 0 | Status: SHIPPED | OUTPATIENT
Start: 2021-06-30 | End: 2021-09-29

## 2021-07-01 VITALS
TEMPERATURE: 98.4 F | WEIGHT: 202.2 LBS | HEIGHT: 70 IN | BODY MASS INDEX: 28.95 KG/M2 | HEART RATE: 70 BPM | SYSTOLIC BLOOD PRESSURE: 169 MMHG | DIASTOLIC BLOOD PRESSURE: 91 MMHG

## 2021-07-01 VITALS
HEART RATE: 72 BPM | TEMPERATURE: 97.3 F | BODY MASS INDEX: 27.89 KG/M2 | SYSTOLIC BLOOD PRESSURE: 129 MMHG | HEIGHT: 70 IN | WEIGHT: 194.8 LBS | DIASTOLIC BLOOD PRESSURE: 87 MMHG

## 2021-07-01 VITALS
HEART RATE: 66 BPM | TEMPERATURE: 98.3 F | SYSTOLIC BLOOD PRESSURE: 170 MMHG | WEIGHT: 201.4 LBS | DIASTOLIC BLOOD PRESSURE: 90 MMHG | HEIGHT: 70 IN | BODY MASS INDEX: 28.83 KG/M2

## 2021-07-01 VITALS
HEART RATE: 65 BPM | WEIGHT: 201.4 LBS | BODY MASS INDEX: 28.83 KG/M2 | SYSTOLIC BLOOD PRESSURE: 156 MMHG | HEIGHT: 70 IN | DIASTOLIC BLOOD PRESSURE: 80 MMHG | TEMPERATURE: 98.2 F

## 2021-07-01 VITALS
HEART RATE: 75 BPM | BODY MASS INDEX: 29.38 KG/M2 | SYSTOLIC BLOOD PRESSURE: 159 MMHG | DIASTOLIC BLOOD PRESSURE: 83 MMHG | WEIGHT: 205.2 LBS | HEIGHT: 70 IN | TEMPERATURE: 98.1 F

## 2021-07-02 VITALS
SYSTOLIC BLOOD PRESSURE: 127 MMHG | TEMPERATURE: 97.9 F | DIASTOLIC BLOOD PRESSURE: 81 MMHG | BODY MASS INDEX: 29.03 KG/M2 | WEIGHT: 202.8 LBS | HEIGHT: 70 IN | HEART RATE: 83 BPM

## 2021-07-02 VITALS
WEIGHT: 200 LBS | BODY MASS INDEX: 28.63 KG/M2 | TEMPERATURE: 99 F | SYSTOLIC BLOOD PRESSURE: 146 MMHG | DIASTOLIC BLOOD PRESSURE: 82 MMHG | HEART RATE: 80 BPM | HEIGHT: 70 IN

## 2021-07-02 VITALS
SYSTOLIC BLOOD PRESSURE: 134 MMHG | OXYGEN SATURATION: 98 % | DIASTOLIC BLOOD PRESSURE: 80 MMHG | TEMPERATURE: 98.3 F | WEIGHT: 215.4 LBS | HEIGHT: 70 IN | HEART RATE: 72 BPM | BODY MASS INDEX: 30.84 KG/M2

## 2021-07-02 VITALS
WEIGHT: 208.8 LBS | DIASTOLIC BLOOD PRESSURE: 89 MMHG | TEMPERATURE: 98.2 F | SYSTOLIC BLOOD PRESSURE: 167 MMHG | HEART RATE: 67 BPM | HEIGHT: 70 IN | BODY MASS INDEX: 29.89 KG/M2

## 2021-09-20 ENCOUNTER — OFFICE VISIT (OUTPATIENT)
Dept: FAMILY MEDICINE CLINIC | Age: 61
End: 2021-09-20

## 2021-09-20 ENCOUNTER — LAB (OUTPATIENT)
Dept: LAB | Facility: HOSPITAL | Age: 61
End: 2021-09-20

## 2021-09-20 VITALS
SYSTOLIC BLOOD PRESSURE: 132 MMHG | BODY MASS INDEX: 30.52 KG/M2 | WEIGHT: 213.2 LBS | HEART RATE: 70 BPM | DIASTOLIC BLOOD PRESSURE: 86 MMHG | HEIGHT: 70 IN | TEMPERATURE: 97.8 F

## 2021-09-20 DIAGNOSIS — Z00.00 MEDICARE ANNUAL WELLNESS VISIT, SUBSEQUENT: Primary | ICD-10-CM

## 2021-09-20 DIAGNOSIS — K76.9 LIVER DISEASE, UNSPECIFIED: ICD-10-CM

## 2021-09-20 DIAGNOSIS — Z23 NEED FOR TDAP VACCINATION: ICD-10-CM

## 2021-09-20 DIAGNOSIS — I35.0 NONRHEUMATIC AORTIC VALVE STENOSIS: ICD-10-CM

## 2021-09-20 DIAGNOSIS — Z23 ENCOUNTER FOR IMMUNIZATION: ICD-10-CM

## 2021-09-20 DIAGNOSIS — Z12.5 SCREENING FOR PROSTATE CANCER: ICD-10-CM

## 2021-09-20 DIAGNOSIS — N18.31 STAGE 3A CHRONIC KIDNEY DISEASE (HCC): ICD-10-CM

## 2021-09-20 DIAGNOSIS — I10 HYPERTENSION, ESSENTIAL: ICD-10-CM

## 2021-09-20 DIAGNOSIS — Z72.9 PROBLEM RELATED TO LIFESTYLE, UNSPECIFIED: ICD-10-CM

## 2021-09-20 DIAGNOSIS — J43.9 PULMONARY EMPHYSEMA, UNSPECIFIED EMPHYSEMA TYPE (HCC): ICD-10-CM

## 2021-09-20 DIAGNOSIS — R79.89 ELEVATED LFTS: ICD-10-CM

## 2021-09-20 PROBLEM — R91.1 LUNG NODULE: Status: ACTIVE | Noted: 2021-09-20

## 2021-09-20 LAB
DEPRECATED RDW RBC AUTO: 42.3 FL (ref 37–54)
ERYTHROCYTE [DISTWIDTH] IN BLOOD BY AUTOMATED COUNT: 11.8 % (ref 12.3–15.4)
HCT VFR BLD AUTO: 44.8 % (ref 37.5–51)
HGB BLD-MCNC: 16 G/DL (ref 13–17.7)
MCH RBC QN AUTO: 34.5 PG (ref 26.6–33)
MCHC RBC AUTO-ENTMCNC: 35.7 G/DL (ref 31.5–35.7)
MCV RBC AUTO: 96.6 FL (ref 79–97)
PLATELET # BLD AUTO: 167 10*3/MM3 (ref 140–450)
PMV BLD AUTO: 10.3 FL (ref 6–12)
RBC # BLD AUTO: 4.64 10*6/MM3 (ref 4.14–5.8)
WBC # BLD AUTO: 8.53 10*3/MM3 (ref 3.4–10.8)

## 2021-09-20 PROCEDURE — G0439 PPPS, SUBSEQ VISIT: HCPCS | Performed by: FAMILY MEDICINE

## 2021-09-20 PROCEDURE — G0103 PSA SCREENING: HCPCS

## 2021-09-20 PROCEDURE — 82977 ASSAY OF GGT: CPT

## 2021-09-20 PROCEDURE — 85027 COMPLETE CBC AUTOMATED: CPT | Performed by: FAMILY MEDICINE

## 2021-09-20 PROCEDURE — 90715 TDAP VACCINE 7 YRS/> IM: CPT | Performed by: FAMILY MEDICINE

## 2021-09-20 PROCEDURE — 90472 IMMUNIZATION ADMIN EACH ADD: CPT | Performed by: FAMILY MEDICINE

## 2021-09-20 PROCEDURE — G0008 ADMIN INFLUENZA VIRUS VAC: HCPCS | Performed by: FAMILY MEDICINE

## 2021-09-20 PROCEDURE — 80053 COMPREHEN METABOLIC PANEL: CPT | Performed by: FAMILY MEDICINE

## 2021-09-20 PROCEDURE — 86803 HEPATITIS C AB TEST: CPT | Performed by: FAMILY MEDICINE

## 2021-09-20 PROCEDURE — 90686 IIV4 VACC NO PRSV 0.5 ML IM: CPT | Performed by: FAMILY MEDICINE

## 2021-09-20 PROCEDURE — 36415 COLL VENOUS BLD VENIPUNCTURE: CPT | Performed by: FAMILY MEDICINE

## 2021-09-20 PROCEDURE — 99213 OFFICE O/P EST LOW 20 MIN: CPT | Performed by: FAMILY MEDICINE

## 2021-09-20 NOTE — ASSESSMENT & PLAN NOTE
We reviewed the preventive service recommendations and created an individualized handout.  Go ahead and give him a Tdap today.

## 2021-09-20 NOTE — ASSESSMENT & PLAN NOTE
Suspect he probably has fatty liver disease.  Talked about the importance of weight loss.  I will go ahead and check repeat labs include hepatitis C screening

## 2021-09-20 NOTE — ASSESSMENT & PLAN NOTE
Does have a history of pulmonary nodule.  Reminded him of his upcoming appointment with pulmonology

## 2021-09-20 NOTE — PROGRESS NOTES
The ABCs of the Annual Wellness Visit  Subsequent Medicare Wellness Visit    Chief Complaint   Patient presents with   • Medicare Wellness-subsequent      Subjective    History of Present Illness:  Chad Oneill is a 61 y.o. male who presents for a Subsequent Medicare Wellness Visit.    The following portions of the patient's history were reviewed and   updated as appropriate: allergies, current medications, past family history, past medical history, past social history, past surgical history and problem list.    Compared to one year ago, the patient feels his physical   health is the same.    Compared to one year ago, the patient feels his mental   health is the same.    Chronic Health Issues:  Also here for follow-up on his chronic health conditions.  He has hypertension and tolerates his medication okay.  Blood pressure looks all right today.  Also history of COPD and a pulmonary nodule.  He has follow-up appointment with pulmonology clinic on the 23rd, which I made him aware.  His last echocardiogram was about a year ago reviewed those results.  Up-to-date with colonoscopy.  Has not had a Tdap.  Last set of labs showed bump in LFTs.  He has not had hepatitis C screening.  Did discuss possibility of fatty liver disease benefit he would receive from weight loss.    Recent Hospitalizations:  He was not admitted to the hospital during the last year.       Current Medical Providers:  Patient Care Team:  Kuldeep Winters MD as PCP - General (Family Medicine)    Outpatient Medications Prior to Visit   Medication Sig Dispense Refill   • albuterol sulfate  (90 Base) MCG/ACT inhaler Inhale 2 puffs Every 4 (Four) Hours As Needed.     • amLODIPine (NORVASC) 10 MG tablet Take 1 tablet by mouth once daily 90 tablet 0   • budesonide-formoterol (SYMBICORT) 160-4.5 MCG/ACT inhaler Inhale 2 puffs 2 (Two) Times a Day.     • losartan-hydrochlorothiazide (HYZAAR) 100-25 MG per tablet Take 1 tablet by mouth once daily  "90 tablet 0   • omeprazole (priLOSEC) 20 MG capsule TAKE 1 CAPSULE BY MOUTH ONCE DAILY AS NEEDED 90 capsule 0   • amLODIPine (NORVASC) 10 MG tablet Take 10 mg by mouth Daily.       No facility-administered medications prior to visit.       No opioid medication identified on active medication list. I have reviewed chart for other potential  high risk medication/s and harmful drug interactions in the elderly.          Aspirin is not on active medication list.  Aspirin use is not indicated based on review of current medical condition/s. Risk of harm outweighs potential benefits.  .    Patient Active Problem List   Diagnosis   • Stage 3a chronic kidney disease (CMS/HCC)   • Hypertension, essential   • Nonrheumatic aortic valve stenosis   • Lung nodule   • Medicare annual wellness visit, subsequent   • Pulmonary emphysema (CMS/[facility])   • Elevated LFTs   • Problem related to lifestyle, unspecified   • Need for Tdap vaccination     Advance Care Planning  Advance Directive is not on file.  ACP discussion was held with the patient during this visit. Patient does not have an advance directive, information provided.    Review of Systems   Constitutional: Negative for fatigue and fever.   Cardiovascular: Negative for chest pain, palpitations and leg swelling.   Gastrointestinal: Negative for abdominal pain, constipation, diarrhea, nausea and vomiting.   Genitourinary: Negative for dysuria.   Psychiatric/Behavioral: Negative for behavioral problems, hallucinations and sleep disturbance.        Objective    Vitals:    09/20/21 1058   BP: 132/86   Pulse: 70   Temp: 97.8 °F (36.6 °C)   TempSrc: Oral   Weight: 96.7 kg (213 lb 3.2 oz)   Height: 177.8 cm (70\")     BMI Readings from Last 1 Encounters:   09/20/21 30.59 kg/m²   BMI is above normal parameters. Recommendations include: exercise counseling    Does the patient have evidence of cognitive impairment? He is at baseline, and has some intellectual disability    Physical " Exam  Vitals and nursing note reviewed.   Constitutional:       General: He is not in acute distress.     Appearance: Normal appearance. He is obese.   HENT:      Right Ear: Tympanic membrane and ear canal normal.      Left Ear: Tympanic membrane and ear canal normal.      Mouth/Throat:      Mouth: Mucous membranes are moist.      Pharynx: Oropharynx is clear. No oropharyngeal exudate.   Eyes:      General: No scleral icterus.     Conjunctiva/sclera: Conjunctivae normal.   Neck:      Vascular: No carotid bruit.   Cardiovascular:      Rate and Rhythm: Normal rate and regular rhythm.      Heart sounds: Murmur heard.   Systolic murmur is present with a grade of 4/6.   Diastolic murmur is present with a grade of 2/4.   No friction rub. No gallop.    Pulmonary:      Effort: No respiratory distress.      Breath sounds: No wheezing or rales.   Abdominal:      General: Bowel sounds are normal.      Palpations: Abdomen is soft. There is no mass.      Tenderness: There is no abdominal tenderness. There is no guarding or rebound.   Musculoskeletal:         General: No swelling.      Right lower leg: No edema.      Left lower leg: No edema.   Lymphadenopathy:      Cervical: No cervical adenopathy.   Skin:     Coloration: Skin is not jaundiced.      Findings: No lesion.   Neurological:      General: No focal deficit present.      Mental Status: He is alert and oriented to person, place, and time.   Psychiatric:         Mood and Affect: Mood normal.         Speech: Speech normal.         Behavior: Behavior normal.                 HEALTH RISK ASSESSMENT    Smoking Status:  Social History     Tobacco Use   Smoking Status Former Smoker   • Packs/day: 0.25   • Years: 30.00   • Pack years: 7.50   • Types: Cigarettes   • Start date:    • Quit date:    • Years since quittin.7   Smokeless Tobacco Former User   • Types: Chew     Alcohol Consumption:  Social History     Substance and Sexual Activity   Alcohol Use Not Currently     Comment: non-drinker     Fall Risk Screen:    MALU Fall Risk Assessment has not been completed.    Depression Screening:  PHQ-2/PHQ-9 Depression Screening 9/20/2021   Little interest or pleasure in doing things 0   Feeling down, depressed, or hopeless 0   Total Score 0       Health Habits and Functional and Cognitive Screening:  Functional & Cognitive Status 9/20/2021   Do you have difficulty preparing food and eating? No   Do you have difficulty bathing yourself, getting dressed or grooming yourself? No   Do you have difficulty using the toilet? No   Do you have difficulty moving around from place to place? No   Do you have trouble with steps or getting out of a bed or a chair? No   Current Diet Well Balanced Diet   Dental Exam Other        Dental Exam Comment pt states he doesn't go to the dentist, he doesn't have any teeth.   Eye Exam Up to date   Exercise (times per week) 3 times per week   Current Exercises Include Yard Work;Gardening   Do you need help using the phone?  No   Are you deaf or do you have serious difficulty hearing?  No   Do you need help with transportation? No   Do you need help shopping? No   Do you need help preparing meals?  No   Do you need help with housework?  No   Do you need help with laundry? No   Do you need help taking your medications? No   Do you need help managing money? No   Do you ever drive or ride in a car without wearing a seat belt? No   Have you felt unusual stress, anger or loneliness in the last month? No   Who do you live with? Spouse   If you need help, do you have trouble finding someone available to you? No   Have you been bothered in the last four weeks by sexual problems? No   Do you have difficulty concentrating, remembering or making decisions? No       Age-appropriate Screening Schedule:  Refer to the list below for future screening recommendations based on patient's age, sex and/or medical conditions. Orders for these recommended tests are listed in the  plan section. The patient has been provided with a written plan.    Health Maintenance   Topic Date Due   • TDAP/TD VACCINES (1 - Tdap) Never done   • ZOSTER VACCINE (1 of 2) Never done   • INFLUENZA VACCINE  10/01/2021              Assessment/Plan   CMS Preventative Services Quick Reference  Risk Factors Identified During Encounter  Inactivity/Sedentary  Obesity/Overweight   The above risks/problems have been discussed with the patient.  Follow up actions/plans if indicated are seen below in the Assessment/Plan Section.  Pertinent information has been shared with the patient in the After Visit Summary.    Diagnoses and all orders for this visit:    1. Medicare annual wellness visit, subsequent (Primary)  Assessment & Plan:  We reviewed the preventive service recommendations and created an individualized handout.  Go ahead and give him a Tdap today.      2. Hypertension, essential  Assessment & Plan:  Blood pressure simplex doing okay.  We will continue current regimen    Orders:  -     Comprehensive Metabolic Panel  -     CBC (No Diff)    3. Nonrheumatic aortic valve stenosis  Comments:  his last Echo was Sept 2020, we can hold off on repeat at this time  Assessment & Plan:  Reviewed his last echo.  Do not think we need to repeat this year      4. Stage 3a chronic kidney disease (CMS/HCC)  Assessment & Plan:  We will get follow-up lab.  Avoid nephrotoxins      5. Pulmonary emphysema, unspecified emphysema type (CMS/HCC)  Assessment & Plan:  He is doing well on his current regimen of inhaled medications.        6. Encounter for immunization  -     FluLaval >6 Months ([id number])    7. Elevated LFTs  Assessment & Plan:  Suspect he probably has fatty liver disease.  Talked about the importance of weight loss.  I will go ahead and check repeat labs include hepatitis C screening    Orders:  -     Gamma GT; Future    8. Screening for prostate cancer  -     PSA SCREENING; Future    9. Problem related to lifestyle,  unspecified  -     Hepatitis C Antibody    10. Need for Tdap vaccination  -     Tdap Vaccine Greater Than or Equal To 6yo IM    11. Liver disease, unspecified   -     Gamma GT; Future      Follow Up:   No follow-ups on file.     An After Visit Summary and PPPS were made available to the patient.

## 2021-09-21 LAB
ALBUMIN SERPL-MCNC: 4.9 G/DL (ref 3.5–5.2)
ALBUMIN/GLOB SERPL: 2.1 G/DL
ALP SERPL-CCNC: 48 U/L (ref 39–117)
ALT SERPL W P-5'-P-CCNC: 61 U/L (ref 1–41)
ANION GAP SERPL CALCULATED.3IONS-SCNC: 11.5 MMOL/L (ref 5–15)
AST SERPL-CCNC: 36 U/L (ref 1–40)
BILIRUB SERPL-MCNC: 0.4 MG/DL (ref 0–1.2)
BUN SERPL-MCNC: 17 MG/DL (ref 8–23)
BUN/CREAT SERPL: 10.5 (ref 7–25)
CALCIUM SPEC-SCNC: 9 MG/DL (ref 8.6–10.5)
CHLORIDE SERPL-SCNC: 101 MMOL/L (ref 98–107)
CO2 SERPL-SCNC: 26.5 MMOL/L (ref 22–29)
CREAT SERPL-MCNC: 1.62 MG/DL (ref 0.76–1.27)
GFR SERPL CREATININE-BSD FRML MDRD: 43 ML/MIN/1.73
GGT SERPL-CCNC: 29 U/L (ref 8–61)
GLOBULIN UR ELPH-MCNC: 2.3 GM/DL
GLUCOSE SERPL-MCNC: 96 MG/DL (ref 65–99)
HCV AB SER DONR QL: NORMAL
POTASSIUM SERPL-SCNC: 3.8 MMOL/L (ref 3.5–5.2)
PROT SERPL-MCNC: 7.2 G/DL (ref 6–8.5)
PSA SERPL-MCNC: 1.15 NG/ML (ref 0–4)
SODIUM SERPL-SCNC: 139 MMOL/L (ref 136–145)

## 2021-09-21 NOTE — PROGRESS NOTES
Primary Care Provider  Kuldeep Winters MD     Referring Provider  No ref. provider found     Chief Complaint  Lung Nodule, Emphysema, Follow-up (1 year follow up ), and Results (chest ct)    Subjective          History of Presenting Illness  Patient is a 61-year-old  male, patient of Dr. Stern who is a former cigarette smoker who presents for follow-up visit today.  Patient states that since last visit his breathing is doing well.  Patient states he is taking Symbicort inhaler.  Patient states at times he only takes Symbicort once a day instead of twice a day as prescribed.  Patient states has not had take any antibiotics or steroids since last visit and denies any hospitalizations since last visit. Patient denies fever, chills, night sweats, swollen glands in the head and neck, unintentional weight loss, hemoptysis, purulent sputum production, dysphagia, chest pain, palpitations, chest tightness, abdominal pain, nausea, vomiting, and diarrhea.  Patient also denies any myalgias, changes in sense of taste and/or smell, sore throat, any other coronavirus or flu-like symptoms.  Patient denies any leg swelling, orthopnea, paroxysmal nocturnal dyspnea.  Patient is able to perform his activities of daily living.      Review of Systems   Constitutional: Negative for activity change, appetite change, chills, diaphoresis, fatigue, fever, unexpected weight gain and unexpected weight loss.        Negative for Insomnia   HENT: Negative for congestion (Nasal), mouth sores, nosebleeds, postnasal drip, sore throat, swollen glands and trouble swallowing.         Negative for Thrush  Negative for Hoarseness  Negative for Allergies/Hay Fever  Negative for Recent Head injury  Negative for Ear Fullness  Negative for Nasal or Sinus pain  Negative for Dry lips  Negative for Nasal discharge   Respiratory: Negative for apnea, cough, chest tightness, shortness of breath and wheezing.         Negative for  Hemoptysis  Negative for Pleuritic pain   Cardiovascular: Negative for chest pain, palpitations and leg swelling.        Negative for Claudication  Negative for Cyanosis  Negative for Dyspnea on exertion   Gastrointestinal: Negative for abdominal pain, diarrhea, nausea, vomiting and GERD.   Musculoskeletal: Negative for joint swelling and myalgias.        Negative for Joint pain  Negative for Joint stiffness   Skin: Negative for color change, dry skin, pallor and rash.   Neurological: Negative for syncope, weakness and headache.   Hematological: Negative for adenopathy. Does not bruise/bleed easily.        Family History   Problem Relation Age of Onset   • Coronary artery disease Mother    • Pulmonary embolism Mother         POST OP   • Heart attack Brother    • Prostate cancer Brother    • COPD Brother    • Alcohol abuse Maternal Grandfather         Social History     Socioeconomic History   • Marital status:      Spouse name: Not on file   • Number of children: 2   • Years of education: Not on file   • Highest education level: Not on file   Tobacco Use   • Smoking status: Former Smoker     Packs/day: 0.25     Years: 30.00     Pack years: 7.50     Types: Cigarettes     Start date:      Quit date:      Years since quittin.7   • Smokeless tobacco: Former User     Types: Chew   Vaping Use   • Vaping Use: Never used   Substance and Sexual Activity   • Alcohol use: Not Currently     Comment: non-drinker   • Drug use: Never     Comment: none   • Sexual activity: Defer        Past Medical History:   Diagnosis Date   • Acute bronchitis, unspecified    • Acute upper respiratory infection, unspecified    • Anxiety disorder, unspecified    • Anxiety with depression    • Chronic kidney disease, stage III (moderate) (CMS/[facility])    • Colon polyp    • Cough    • Dizziness and giddiness    • Emphysema, unspecified (CMS/[facility])    • Fatigue    • GERD (gastroesophageal reflux disease)    • Heartburn    • Hypertension     • Mild intermittent acute bronchitis with asthma with acute exacerbation    • Nonrheumatic aortic (valve) stenosis    • Vision problem         Immunization History   Administered Date(s) Administered   • COVID-19 (PFIZER) 04/01/2021, 04/22/2021   • Flu Vaccine Intradermal Quad 18-64YR 01/03/2013   • Flu Vaccine Quad PF >36MO 11/27/2015   • FluLaval/Fluarix (VFC) >6 Months 09/20/2021   • Influenza Quad Vaccine (Inpatient) 11/01/2013   • Influenza, Unspecified 09/24/2020   • PPD Test 04/24/2015   • Pneumococcal Polysaccharide (PPSV23) 02/24/2020   • Tdap 09/20/2021       Allergies   Allergen Reactions   • Benadryl [Diphenhydramine] Palpitations          Current Outpatient Medications:   •  albuterol sulfate  (90 Base) MCG/ACT inhaler, Inhale 2 puffs Every 4 (Four) Hours As Needed for Wheezing or Shortness of Air., Disp: 18 g, Rfl: 5  •  amLODIPine (NORVASC) 10 MG tablet, Take 1 tablet by mouth once daily, Disp: 90 tablet, Rfl: 0  •  budesonide-formoterol (SYMBICORT) 160-4.5 MCG/ACT inhaler, Inhale 2 puffs 2 (Two) Times a Day for 30 days. Rinse mouth out after each use., Disp: 1 each, Rfl: 11  •  losartan-hydrochlorothiazide (HYZAAR) 100-25 MG per tablet, Take 1 tablet by mouth once daily, Disp: 90 tablet, Rfl: 0  •  multivitamin with minerals tablet tablet, Take 1 tablet by mouth Daily., Disp: , Rfl:   •  omeprazole (priLOSEC) 20 MG capsule, TAKE 1 CAPSULE BY MOUTH ONCE DAILY AS NEEDED, Disp: 90 capsule, Rfl: 0     Objective     Physical Exam  Vital Signs Reviewed  WDWN, Alert, NAD.    HEENT:  PERRL, EOMI.  OP, nares clear, no sinus tenderness  Neck:  Supple, no JVD, no thyromegaly.  Lymph: no axillary, cervical, supraclavicular lymphadenopathy noted bilaterally  Chest:  good aeration, clear to auscultation bilaterally, tympanic to percussion bilaterally, no work of breathing noted  CV: RRR, no MGR, pulses 2+, equal.  Abd:  Soft, NT, ND, + BS, no HSM  EXT:  no clubbing, no cyanosis, no edema, no joint  "tenderness  Neuro:  A&Ox3, CN grossly intact, no focal deficits.  Skin: No rashes or lesions noted.    Vital Signs:   /71   Pulse 70   Resp 14   Ht 177.8 cm (70\")   Wt 95.7 kg (211 lb)   SpO2 97% Comment: room air  BMI 30.28 kg/m²         Result Review :   I have personally reviewed Dr. Le's last office visit note.         Assessment and Plan      Assessment:  1.  5 mm solitary lung nodule right upper lobe.  Will need follow up on this high risk patient.      2. Dyspnea, markedly improved.        3.  Emphysema.  Likely does have some COPD versus asthma. The patient refuses  PFTs at this time.  His largest component is asthma and has a component of  overlap syndrome.  4. Peripheral eosinophilia.    5. Tobacco abuse with cigarettes in remission.            Plan:  1.    Patient reports that he is only taking Symbicort once daily at times instead of twice daily as prescribed.  Patient is advised to take Symbicort 160/4.5 two puffs twice a day and rinse mouth out after each use.  Risks of not taking medication as prescribed discussed with the patient and patient verbalized understanding and compliance.   2.  Continue albuterol inhaler as needed.      3.    Will repeat noncontrast chest CT to follow up 5 mm lung nodules in  this high risk patient.      4.  I will schedule patient for full pulmonary function test and six-minute walk test.    Advised patient that tests are currently on hold due to the COVID-19 pandemic and when testing resumed I will make sure he is scheduled.  Order has been placed today.  5. He is up-to-date with flu vaccine and Pneumovax and Covid vaccines, no indication for Prevnar.  Patient is advised to continue to follow CDC recommendations such as social distancing, wearing a mask, and washing hands for least 20 seconds.  6.  Patient to call the office, 911, or go to the ER with new or worsening symptoms.  7.  Follow-up with Dr. Le in 6 months, sooner if needed.  "             Follow Up   Return in about 6 months (around 3/23/2022) for with Dr. Le.  Patient was given instructions and counseling regarding his condition or for health maintenance advice. Please see specific information pulled into the AVS if appropriate.

## 2021-09-21 NOTE — PATIENT INSTRUCTIONS
Chronic Obstructive Pulmonary Disease Exacerbation    Chronic obstructive pulmonary disease (COPD) is a long-term (chronic) condition that affects the lungs. COPD is a general term that can be used to describe many different lung problems that cause lung swelling (inflammation) and limit airflow, including chronic bronchitis and emphysema. COPD exacerbations are episodes when breathing symptoms become much worse and require extra treatment.  COPD exacerbations are usually caused by infections. Without treatment, COPD exacerbations can be severe and even life threatening. Frequent COPD exacerbations can cause further damage to the lungs.  What are the causes?  This condition may be caused by:  · Respiratory infections, including viral and bacterial infections.  · Exposure to smoke.  · Exposure to air pollution, chemical fumes, or dust.  · Things that give you an allergic reaction (allergens).  · Not taking your usual COPD medicines as directed.  · Underlying medical problems, such as congestive heart failure or infections not involving the lungs.  In many cases, the cause (trigger) of this condition is not known.  What increases the risk?  The following factors may make you more likely to develop this condition:  · Smoking cigarettes.  · Old age.  · Frequent prior COPD exacerbations.  What are the signs or symptoms?  Symptoms of this condition include:  · Increased coughing.  · Increased production of mucus from your lungs (sputum).  · Increased wheezing.  · Increased shortness of breath.  · Rapid or labored breathing.  · Chest tightness.  · Less energy than usual.  · Sleep disruption from symptoms.  · Confusion or increased sleepiness.  Often these symptoms happen or get worse even with the use of medicines.  How is this diagnosed?  This condition is diagnosed based on:  · Your medical history.  · A physical exam.  You may also have tests, including:  · A chest X-ray.  · Blood tests.  · Lung (pulmonary) function  tests.  How is this treated?  Treatment for this condition depends on the severity and cause of the symptoms. You may need to be admitted to a hospital for treatment. Some of the treatments commonly used to treat COPD exacerbations are:  · Antibiotic medicines. These may be used for severe exacerbations caused by a lung infection, such as pneumonia.  · Bronchodilators. These are inhaled medicines that expand the air passages and allow increased airflow.  · Steroid medicines. These act to reduce inflammation in the airways. They may be given with an inhaler, taken by mouth, or given through an IV tube inserted into one of your veins.  · Supplemental oxygen therapy.  · Airway clearing techniques, such as noninvasive ventilation (NIV) and positive expiratory pressure (PEP). These provide respiratory support through a mask or other noninvasive device. An example of this would be using a continuous positive airway pressure (CPAP) machine to improve delivery of oxygen into your lungs.  Follow these instructions at home:  Medicines  · Take over-the-counter and prescription medicines only as told by your health care provider. It is important to use correct technique with inhaled medicines.  · If you were prescribed an antibiotic medicine or oral steroid, take it as told by your health care provider. Do not stop taking the medicine even if you start to feel better.  Lifestyle  · Eat a healthy diet.  · Exercise regularly.  · Get plenty of sleep.  · Avoid exposure to all substances that irritate the airway, especially to tobacco smoke.  · Wash your hands often with soap and water to reduce the risk of infection. If soap and water are not available, use hand .  · During flu season, avoid enclosed spaces that are crowded with people.  General instructions  · Drink enough fluid to keep your urine clear or pale yellow (unless you have a medical condition that requires fluid restriction).  · Use a cool mist vaporizer. This  humidifies the air and makes it easier for you to clear your chest when you cough.  · If you have a home nebulizer and oxygen, continue to use them as told by your health care provider.  · Keep all follow-up visits as told by your health care provider. This is important.  How is this prevented?  · Stay up-to-date on pneumococcal and influenza (flu) vaccines. A flu shot is recommended every year to help prevent exacerbations.  · Do not use any products that contain nicotine or tobacco, such as cigarettes and e-cigarettes. Quitting smoking is very important in preventing COPD from getting worse and in preventing exacerbations from happening as often. If you need help quitting, ask your health care provider.  · Follow all instructions for pulmonary rehabilitation after a recent exacerbation. This can help prevent future exacerbations.  · Work with your health care provider to develop and follow an action plan. This tells you what steps to take when you experience certain symptoms.  Contact a health care provider if:  · You have a worsening of your regular COPD symptoms.  Get help right away if:  · You have worsening shortness of breath, even when resting.  · You have trouble talking.  · You have severe chest pain.  · You cough up blood.  · You have a fever.  · You have weakness, vomit repeatedly, or faint.  · You feel confused.  · You are not able to sleep because of your symptoms.  · You have trouble doing daily activities.  Summary  · COPD exacerbations are episodes when breathing symptoms become much worse and require extra treatment above your normal treatment.  · Exacerbations can be severe and even life threatening. Frequent COPD exacerbations can cause further damage to your lungs.  · COPD exacerbations are usually triggered by infections such as the flu, colds, and even pneumonia.  · Treatment for this condition depends on the severity and cause of the symptoms. You may need to be admitted to a hospital for  treatment.  · Quitting smoking is very important to prevent COPD from getting worse and to prevent exacerbations from happening as often.  This information is not intended to replace advice given to you by your health care provider. Make sure you discuss any questions you have with your health care provider.  Document Revised: 11/30/2018 Document Reviewed: 01/22/2018  [employer] Patient Education © 2021 [employer]

## 2021-09-22 DIAGNOSIS — N18.31 STAGE 3A CHRONIC KIDNEY DISEASE (HCC): ICD-10-CM

## 2021-09-22 DIAGNOSIS — R79.89 ELEVATED LFTS: Primary | ICD-10-CM

## 2021-09-23 ENCOUNTER — OFFICE VISIT (OUTPATIENT)
Dept: PULMONOLOGY | Facility: CLINIC | Age: 61
End: 2021-09-23

## 2021-09-23 VITALS
BODY MASS INDEX: 30.21 KG/M2 | WEIGHT: 211 LBS | DIASTOLIC BLOOD PRESSURE: 71 MMHG | HEART RATE: 70 BPM | SYSTOLIC BLOOD PRESSURE: 137 MMHG | RESPIRATION RATE: 14 BRPM | HEIGHT: 70 IN | OXYGEN SATURATION: 97 %

## 2021-09-23 DIAGNOSIS — J43.9 PULMONARY EMPHYSEMA, UNSPECIFIED EMPHYSEMA TYPE (HCC): Primary | ICD-10-CM

## 2021-09-23 DIAGNOSIS — F17.201 TOBACCO ABUSE, IN REMISSION: ICD-10-CM

## 2021-09-23 DIAGNOSIS — R06.00 DYSPNEA, UNSPECIFIED TYPE: ICD-10-CM

## 2021-09-23 DIAGNOSIS — R91.1 LUNG NODULE: ICD-10-CM

## 2021-09-23 DIAGNOSIS — D72.19 PERIPHERAL EOSINOPHILIA: ICD-10-CM

## 2021-09-23 PROBLEM — I35.0 AORTIC STENOSIS: Status: ACTIVE | Noted: 2017-04-05

## 2021-09-23 PROCEDURE — 99214 OFFICE O/P EST MOD 30 MIN: CPT | Performed by: NURSE PRACTITIONER

## 2021-09-23 RX ORDER — ALBUTEROL SULFATE 90 UG/1
2 AEROSOL, METERED RESPIRATORY (INHALATION) EVERY 4 HOURS PRN
Qty: 18 G | Refills: 5 | Status: SHIPPED | OUTPATIENT
Start: 2021-09-23 | End: 2022-04-11 | Stop reason: SDUPTHER

## 2021-09-23 RX ORDER — BUDESONIDE AND FORMOTEROL FUMARATE DIHYDRATE 160; 4.5 UG/1; UG/1
2 AEROSOL RESPIRATORY (INHALATION)
Qty: 1 EACH | Refills: 11 | Status: SHIPPED | OUTPATIENT
Start: 2021-09-23 | End: 2022-04-11

## 2021-09-23 RX ORDER — MULTIPLE VITAMINS W/ MINERALS TAB 9MG-400MCG
1 TAB ORAL DAILY
COMMUNITY
End: 2022-07-11

## 2021-09-29 DIAGNOSIS — I10 HYPERTENSION, ESSENTIAL: ICD-10-CM

## 2021-09-29 RX ORDER — AMLODIPINE BESYLATE 10 MG/1
TABLET ORAL
Qty: 90 TABLET | Refills: 0 | Status: SHIPPED | OUTPATIENT
Start: 2021-09-29 | End: 2022-01-03

## 2021-10-05 ENCOUNTER — TELEPHONE (OUTPATIENT)
Dept: FAMILY MEDICINE CLINIC | Age: 61
End: 2021-10-05

## 2021-10-05 NOTE — TELEPHONE ENCOUNTER
Caller: Chad Oneill    Relationship: Self    Best call back number: [phone]    What is the best time to reach you: ANYTIME    Who are you requesting to speak with (clinical staff, provider,  specific staff member): CLINICAL      What was the call regarding: RETURNING CALL    Do you require a callback: YES

## 2021-10-12 ENCOUNTER — TELEPHONE (OUTPATIENT)
Dept: FAMILY MEDICINE CLINIC | Age: 61
End: 2021-10-12

## 2021-10-12 NOTE — TELEPHONE ENCOUNTER
Caller: Chad Oneill    Relationship: Self    Best call back number: [phone]     What was the call regarding: PATIENT WAS CALLING IN TO GET CONFIRMATION ON HIS ULTRASOUND.     HE IS WANTING TO GET A SOONER APPOINTMENT THAN WE HAD AVAILABLE.     FIRST APPOINTMENT I SAW WAS 11/5 BUT HE NEEDS SOMETHING SOONER THAN THAT.     Do you require a callback: YES, PLEASE CALL BACK AND ADVISE.

## 2021-10-30 DIAGNOSIS — R12 HEARTBURN: ICD-10-CM

## 2021-11-01 RX ORDER — OMEPRAZOLE 20 MG/1
CAPSULE, DELAYED RELEASE ORAL
Qty: 30 CAPSULE | Refills: 0 | Status: SHIPPED | OUTPATIENT
Start: 2021-11-01 | End: 2022-02-07

## 2021-11-12 ENCOUNTER — HOSPITAL ENCOUNTER (OUTPATIENT)
Dept: ULTRASOUND IMAGING | Facility: HOSPITAL | Age: 61
Discharge: HOME | End: 2021-11-12
Admitting: FAMILY MEDICINE

## 2021-11-12 DIAGNOSIS — N18.31 STAGE 3A CHRONIC KIDNEY DISEASE (HCC): ICD-10-CM

## 2021-11-12 DIAGNOSIS — R79.89 ELEVATED LFTS: ICD-10-CM

## 2021-11-12 PROCEDURE — 76700 US EXAM ABDOM COMPLETE: CPT

## 2021-11-27 DIAGNOSIS — I10 HYPERTENSION, ESSENTIAL: ICD-10-CM

## 2021-11-29 RX ORDER — LOSARTAN POTASSIUM AND HYDROCHLOROTHIAZIDE 25; 100 MG/1; MG/1
TABLET ORAL
Qty: 90 TABLET | Refills: 0 | Status: SHIPPED | OUTPATIENT
Start: 2021-11-29 | End: 2022-02-25

## 2022-01-01 DIAGNOSIS — I10 HYPERTENSION, ESSENTIAL: ICD-10-CM

## 2022-01-03 RX ORDER — AMLODIPINE BESYLATE 10 MG/1
TABLET ORAL
Qty: 90 TABLET | Refills: 0 | Status: SHIPPED | OUTPATIENT
Start: 2022-01-03 | End: 2022-04-11

## 2022-02-04 DIAGNOSIS — R12 HEARTBURN: ICD-10-CM

## 2022-02-07 RX ORDER — OMEPRAZOLE 20 MG/1
CAPSULE, DELAYED RELEASE ORAL
Qty: 30 CAPSULE | Refills: 1 | Status: SHIPPED | OUTPATIENT
Start: 2022-02-07 | End: 2022-04-11

## 2022-02-25 DIAGNOSIS — I10 HYPERTENSION, ESSENTIAL: ICD-10-CM

## 2022-02-25 RX ORDER — LOSARTAN POTASSIUM AND HYDROCHLOROTHIAZIDE 25; 100 MG/1; MG/1
TABLET ORAL
Qty: 90 TABLET | Refills: 0 | Status: SHIPPED | OUTPATIENT
Start: 2022-02-25 | End: 2022-04-28

## 2022-04-04 NOTE — PROGRESS NOTES
Primary Care Provider  Kuldeep Winters MD     Referring Provider  No ref. provider found     Chief Complaint  Lung Nodule and Emphysema    Subjective          History of Presenting Illness  Patient is a 62-year-old male, patient of Dr. Le's who presents for management of emphysema and lung nodule who presents for follow-up visit today.  Patient states that he does get short of breath that is worse with exertion, mild to moderate severity, and improved with rest.  Patient states that he has wheezing when he is burning wood. Patient states that he is taking Symbicort every day as prescribed.  Patient states that he uses rescue inhaler twice a day.  Patient states that he has not had take any antibiotics or steroids since last visit and denies any hospitalizations since last visit. Patient denies fever, chills, night sweats, swollen glands in the head and neck, unintentional weight loss, hemoptysis, purulent sputum production, dysphagia, chest pain, palpitations, chest tightness, abdominal pain, nausea, vomiting, and diarrhea.  Patient also denies any myalgias, changes in sense of taste and/or smell, sore throat, any other coronavirus or flu-like symptoms.  Patient denies any leg swelling, orthopnea, paroxysmal nocturnal dyspnea.  Patient is able to perform activities of daily living.      Review of Systems   Constitutional: Negative for activity change, appetite change, chills, diaphoresis, fatigue, fever, unexpected weight gain and unexpected weight loss.        Negative for Insomnia   HENT: Negative for congestion (Nasal), mouth sores, nosebleeds, postnasal drip, sore throat, swollen glands and trouble swallowing.         Negative for Thrush  Negative for Hoarseness  Negative for Allergies/Hay Fever  Negative for Recent Head injury  Negative for Ear Fullness  Negative for Nasal or Sinus pain  Negative for Dry lips  Negative for Nasal discharge   Respiratory: Positive for shortness of breath and wheezing.  Negative for apnea, cough and chest tightness.         Negative for Hemoptysis  Negative for Pleuritic pain   Cardiovascular: Negative for chest pain, palpitations and leg swelling.        Negative for Claudication  Negative for Cyanosis  Negative for Dyspnea on exertion   Gastrointestinal: Negative for abdominal pain, diarrhea, nausea, vomiting and GERD.   Musculoskeletal: Negative for joint swelling and myalgias.        Negative for Joint pain  Negative for Joint stiffness   Skin: Negative for color change, dry skin, pallor and rash.   Neurological: Negative for syncope, weakness and headache.   Hematological: Negative for adenopathy. Does not bruise/bleed easily.        Family History   Problem Relation Age of Onset   • Coronary artery disease Mother    • Pulmonary embolism Mother         POST OP   • Heart attack Brother    • Prostate cancer Brother    • COPD Brother    • Alcohol abuse Maternal Grandfather         Social History     Socioeconomic History   • Marital status:    • Number of children: 2   Tobacco Use   • Smoking status: Former Smoker     Packs/day: 0.25     Years: 30.00     Pack years: 7.50     Types: Cigarettes     Start date:      Quit date:      Years since quittin.2   • Smokeless tobacco: Former User     Types: Chew   Vaping Use   • Vaping Use: Never used   Substance and Sexual Activity   • Alcohol use: Not Currently     Comment: non-drinker   • Drug use: Never     Comment: none   • Sexual activity: Defer        Past Medical History:   Diagnosis Date   • Acute bronchitis, unspecified    • Acute upper respiratory infection, unspecified    • Anxiety disorder, unspecified    • Anxiety with depression    • Chronic kidney disease, stage III (moderate) ([facility])    • Colon polyp    • Cough    • Dizziness and giddiness    • Emphysema, unspecified ([facility])    • Fatigue    • GERD (gastroesophageal reflux disease)    • Heartburn    • Hypertension    • Mild intermittent acute bronchitis with  asthma with acute exacerbation    • Nonrheumatic aortic (valve) stenosis    • Vision problem         Immunization History   Administered Date(s) Administered   • COVID-19 (PFIZER) PURPLE CAP 04/01/2021, 04/22/2021   • Covid-19 (Pfizer) Gray Cap 04/08/2022   • Flu Vaccine Intradermal Quad 18-64YR 01/03/2013   • Flu Vaccine Quad PF >36MO 11/27/2015   • FluLaval/Fluarix/Fluzone >6 09/20/2021   • Influenza Quad Vaccine (Inpatient) 11/01/2013   • Influenza, Unspecified 09/24/2020   • PPD Test 04/24/2015   • Pneumococcal Polysaccharide (PPSV23) 02/24/2020   • Tdap 09/20/2021       Allergies   Allergen Reactions   • Benadryl [Diphenhydramine] Palpitations          Current Outpatient Medications:   •  albuterol sulfate  (90 Base) MCG/ACT inhaler, Inhale 2 puffs Every 4 (Four) Hours As Needed for Wheezing or Shortness of Air., Disp: 18 g, Rfl: 5  •  amLODIPine (NORVASC) 10 MG tablet, Take 1 tablet by mouth once daily, Disp: 90 tablet, Rfl: 0  •  Budeson-Glycopyrrol-Formoterol (Breztri Aerosphere) 160-9-4.8 MCG/ACT aerosol inhaler, Inhale 2 puffs 2 (Two) Times a Day for 30 days. Rinse mouth out after each use, Disp: 1 each, Rfl: 5  •  doxazosin (Cardura) 2 MG tablet, Take 1 tablet by mouth Every Night., Disp: 30 tablet, Rfl: 2  •  losartan-hydrochlorothiazide (HYZAAR) 100-25 MG per tablet, Take 1 tablet by mouth once daily, Disp: 90 tablet, Rfl: 0  •  multivitamin with minerals tablet tablet, Take 1 tablet by mouth Daily., Disp: , Rfl:   •  omeprazole (priLOSEC) 20 MG capsule, TAKE 1 CAPSULE BY MOUTH ONCE DAILY AS NEEDED, Disp: 30 capsule, Rfl: 1     Objective     Physical Exam  Vital Signs Reviewed  WDWN, Alert, NAD.    HEENT:  PERRL, EOMI.  OP, nares clear, no sinus tenderness  Neck:  Supple, no JVD, no thyromegaly.  Lymph: no axillary, cervical, supraclavicular lymphadenopathy noted bilaterally  Chest:  good aeration, clear to auscultation bilaterally, tympanic to percussion bilaterally, no work of breathing  "noted  CV: RRR, no MGR, pulses 2+, equal.  Abd:  Soft, NT, ND, + BS, no HSM  EXT:  no clubbing, no cyanosis, no edema, no joint tenderness  Neuro:  A&Ox3, CN grossly intact, no focal deficits.  Skin: No rashes or lesions noted.    Vital Signs:   /79   Pulse 100   Resp 14   Ht 175.3 cm (69\")   Wt 90.7 kg (200 lb)   SpO2 96% Comment: room air  BMI 29.53 kg/m²         Result Review :   I have personally reviewed my last office visit note.         Assessment and Plan      Assessment:  1.  Lung nodule. 5 mm solitary lung nodule right upper lobe.  Will need follow up on this high risk patient.      2. Dyspnea, markedly improved.        3.  Emphysema.  Likely does have some COPD versus asthma. The patient refuses  PFTs at this time.  His largest component is asthma and has a component of  overlap syndrome.  4. Peripheral eosinophilia.    5. Tobacco abuse with cigarettes in remission.      Plan:   1.   Patient reports he is having to use his rescue inhaler twice a day.  Will stop Symbicort and start patient on Breztri 2 puffs twice daily.  Patient is advised to rinse mouth out after each use.  Samples of Breztri given to the patient in the office today.  2.  Continue albuterol inhaler as needed.     3.  Patient is advised to avoid burning wood.  Discussed with patient that exposure to wood-burning smoke can cause asthma attacks and bronchitis and also can aggravate heart and lung disease.  4.    Will repeat noncontrast chest CT to follow up 5 mm lung nodules in  this high risk patient.      5.   Pulmonary function test and 6-minute walk test no longer on hold.  Will schedule patient for full pulmonary function test and six-minute walk test.  Orders placed today.  6.  Vaccination status: He is up-to-date with flu vaccine and Pneumovax and Covid vaccines, no indication for Prevnar.  Patient is advised to continue to follow CDC recommendations such as social distancing, wearing a mask, and washing hands for least " 20 seconds.  7.  Smoking status: patient is a former cigarette smoker.   8.  Patient to call the office, 911, or go to the ER with new or worsening symptoms.  9.  Follow-up in 4 months, sooner if needed.            Follow Up   Return for 4 months in [place].  Patient was given instructions and counseling regarding his condition or for health maintenance advice. Please see specific information pulled into the AVS if appropriate.

## 2022-04-04 NOTE — PATIENT INSTRUCTIONS
Chronic Obstructive Pulmonary Disease Exacerbation    Chronic obstructive pulmonary disease (COPD) is a long-term (chronic) condition that affects the lungs. COPD is a general term that can be used to describe many different lung problems that cause lung swelling (inflammation) and limit airflow, including chronic bronchitis and emphysema. COPD exacerbations are episodes when breathing symptoms become much worse and require extra treatment.  COPD exacerbations are usually caused by infections. Without treatment, COPD exacerbations can be severe and even life threatening. Frequent COPD exacerbations can cause further damage to the lungs.  What are the causes?  This condition may be caused by:  Respiratory infections, including viral and bacterial infections.  Exposure to smoke.  Exposure to air pollution, chemical fumes, or dust.  Things that give you an allergic reaction (allergens).  Not taking your usual COPD medicines as directed.  Underlying medical problems, such as congestive heart failure or infections not involving the lungs.  In many cases, the cause (trigger) of this condition is not known.  What increases the risk?  The following factors may make you more likely to develop this condition:  Smoking cigarettes.  Old age.  Frequent prior COPD exacerbations.  What are the signs or symptoms?  Symptoms of this condition include:  Increased coughing.  Increased production of mucus from your lungs (sputum).  Increased wheezing.  Increased shortness of breath.  Rapid or labored breathing.  Chest tightness.  Less energy than usual.  Sleep disruption from symptoms.  Confusion or increased sleepiness.  Often these symptoms happen or get worse even with the use of medicines.  How is this diagnosed?  This condition is diagnosed based on:  Your medical history.  A physical exam.  You may also have tests, including:  A chest X-ray.  Blood tests.  Lung (pulmonary) function tests.  How is this treated?  Treatment for this  condition depends on the severity and cause of the symptoms. You may need to be admitted to a hospital for treatment. Some of the treatments commonly used to treat COPD exacerbations are:  Antibiotic medicines. These may be used for severe exacerbations caused by a lung infection, such as pneumonia.  Bronchodilators. These are inhaled medicines that expand the air passages and allow increased airflow.  Steroid medicines. These act to reduce inflammation in the airways. They may be given with an inhaler, taken by mouth, or given through an IV tube inserted into one of your veins.  Supplemental oxygen therapy.  Airway clearing techniques, such as noninvasive ventilation (NIV) and positive expiratory pressure (PEP). These provide respiratory support through a mask or other noninvasive device. An example of this would be using a continuous positive airway pressure (CPAP) machine to improve delivery of oxygen into your lungs.  Follow these instructions at home:  Medicines  Take over-the-counter and prescription medicines only as told by your health care provider. It is important to use correct technique with inhaled medicines.  If you were prescribed an antibiotic medicine or oral steroid, take it as told by your health care provider. Do not stop taking the medicine even if you start to feel better.  Lifestyle  Eat a healthy diet.  Exercise regularly.  Get plenty of sleep.  Avoid exposure to all substances that irritate the airway, especially to tobacco smoke.  Wash your hands often with soap and water to reduce the risk of infection. If soap and water are not available, use hand .  During flu season, avoid enclosed spaces that are crowded with people.  General instructions  Drink enough fluid to keep your urine clear or pale yellow (unless you have a medical condition that requires fluid restriction).  Use a cool mist vaporizer. This humidifies the air and makes it easier for you to clear your chest when you  cough.  If you have a home nebulizer and oxygen, continue to use them as told by your health care provider.  Keep all follow-up visits as told by your health care provider. This is important.  How is this prevented?  Stay up-to-date on pneumococcal and influenza (flu) vaccines. A flu shot is recommended every year to help prevent exacerbations.  Do not use any products that contain nicotine or tobacco, such as cigarettes and e-cigarettes. Quitting smoking is very important in preventing COPD from getting worse and in preventing exacerbations from happening as often. If you need help quitting, ask your health care provider.  Follow all instructions for pulmonary rehabilitation after a recent exacerbation. This can help prevent future exacerbations.  Work with your health care provider to develop and follow an action plan. This tells you what steps to take when you experience certain symptoms.  Contact a health care provider if:  You have a worsening of your regular COPD symptoms.  Get help right away if:  You have worsening shortness of breath, even when resting.  You have trouble talking.  You have severe chest pain.  You cough up blood.  You have a fever.  You have weakness, vomit repeatedly, or faint.  You feel confused.  You are not able to sleep because of your symptoms.  You have trouble doing daily activities.  Summary  COPD exacerbations are episodes when breathing symptoms become much worse and require extra treatment above your normal treatment.  Exacerbations can be severe and even life threatening. Frequent COPD exacerbations can cause further damage to your lungs.  COPD exacerbations are usually triggered by infections such as the flu, colds, and even pneumonia.  Treatment for this condition depends on the severity and cause of the symptoms. You may need to be admitted to a hospital for treatment.  Quitting smoking is very important to prevent COPD from getting worse and to prevent exacerbations from  happening as often.  This information is not intended to replace advice given to you by your health care provider. Make sure you discuss any questions you have with your health care provider.  Document Revised: 11/30/2018 Document Reviewed: 01/22/2018  [employer] Patient Education © 2021 [employer]

## 2022-04-08 ENCOUNTER — OFFICE VISIT (OUTPATIENT)
Dept: FAMILY MEDICINE CLINIC | Age: 62
End: 2022-04-08

## 2022-04-08 VITALS
HEIGHT: 70 IN | BODY MASS INDEX: 28.77 KG/M2 | SYSTOLIC BLOOD PRESSURE: 154 MMHG | HEART RATE: 80 BPM | WEIGHT: 201 LBS | DIASTOLIC BLOOD PRESSURE: 88 MMHG | OXYGEN SATURATION: 97 %

## 2022-04-08 DIAGNOSIS — R12 HEARTBURN: ICD-10-CM

## 2022-04-08 DIAGNOSIS — I10 HYPERTENSION, ESSENTIAL: Primary | ICD-10-CM

## 2022-04-08 DIAGNOSIS — I10 HYPERTENSION, ESSENTIAL: ICD-10-CM

## 2022-04-08 DIAGNOSIS — R79.89 ELEVATED LFTS: ICD-10-CM

## 2022-04-08 DIAGNOSIS — R93.2 ABNORMAL LIVER ULTRASOUND: ICD-10-CM

## 2022-04-08 DIAGNOSIS — N18.31 STAGE 3A CHRONIC KIDNEY DISEASE: ICD-10-CM

## 2022-04-08 DIAGNOSIS — J43.9 PULMONARY EMPHYSEMA, UNSPECIFIED EMPHYSEMA TYPE: ICD-10-CM

## 2022-04-08 DIAGNOSIS — Z23 IMMUNIZATION DUE: ICD-10-CM

## 2022-04-08 PROCEDURE — 99213 OFFICE O/P EST LOW 20 MIN: CPT | Performed by: FAMILY MEDICINE

## 2022-04-08 PROCEDURE — 0053A COVID-19 (PFIZER) 12+ YRS: CPT | Performed by: FAMILY MEDICINE

## 2022-04-08 PROCEDURE — 91305 COVID-19 (PFIZER) 12+ YRS: CPT | Performed by: FAMILY MEDICINE

## 2022-04-08 RX ORDER — DOXAZOSIN 2 MG/1
2 TABLET ORAL NIGHTLY
Qty: 30 TABLET | Refills: 2 | Status: SHIPPED | OUTPATIENT
Start: 2022-04-08 | End: 2022-07-01

## 2022-04-08 NOTE — PROGRESS NOTES
Chief Complaint  Hypertension (6 month ) and Chronic Kidney Disease    Subjective     {Problem List  Visit Diagnosis   Encounters  Notes  Medications  Labs  Result Review Imaging  Media :23}     Chad Oneill presents to [facility]  History of Present Illness  Here for general follow-up on his chronic health issues.  Does not follow his blood pressure at home.  Not having issues with headache vision changes lightheadedness or dizziness.  Reviewed with him the results of his ultrasound from 710 number which was obtained due to increased LFT and CKD.  Kidneys looked okay but he did have a finding in the gallbladder that was inconclusive and recommended follow-up.  He is agreeable to go ahead and get that scheduled.    Current Outpatient Medications   Medication Sig Dispense Refill   • albuterol sulfate  (90 Base) MCG/ACT inhaler Inhale 2 puffs Every 4 (Four) Hours As Needed for Wheezing or Shortness of Air. 18 g 5   • amLODIPine (NORVASC) 10 MG tablet Take 1 tablet by mouth once daily 90 tablet 0   • losartan-hydrochlorothiazide (HYZAAR) 100-25 MG per tablet Take 1 tablet by mouth once daily 90 tablet 0   • multivitamin with minerals tablet tablet Take 1 tablet by mouth Daily.     • omeprazole (priLOSEC) 20 MG capsule TAKE 1 CAPSULE BY MOUTH ONCE DAILY AS NEEDED 30 capsule 1   • budesonide-formoterol (SYMBICORT) 160-4.5 MCG/ACT inhaler Inhale 2 puffs 2 (Two) Times a Day for 30 days. Rinse mouth out after each use. 1 each 11   • doxazosin (Cardura) 2 MG tablet Take 1 tablet by mouth Every Night. 30 tablet 2     No current facility-administered medications for this visit.       Review of Systems   Constitutional: Negative for chills, fatigue and fever.   Respiratory: Negative for chest tightness, shortness of breath and wheezing.    Cardiovascular: Negative for chest pain and palpitations.   Gastrointestinal: Negative for constipation, diarrhea, nausea and vomiting.  "  Genitourinary: Negative for dysuria, hematuria and urgency.   Neurological: Negative for weakness and headache.   Psychiatric/Behavioral: Negative for hallucinations.            Objective   Vital Signs:   /88 (BP Location: Right arm, Patient Position: Sitting, Cuff Size: Adult) Comment: by gabriela  Pulse 80   Ht 177.8 cm (70\")   Wt 91.2 kg (201 lb)   SpO2 97%   BMI 28.84 kg/m²     Physical Exam  Vitals and nursing note reviewed.   Constitutional:       General: He is not in acute distress.     Appearance: Normal appearance. He is obese.   HENT:      Right Ear: Tympanic membrane and ear canal normal.      Left Ear: Tympanic membrane and ear canal normal.      Mouth/Throat:      Mouth: Mucous membranes are moist.      Pharynx: Oropharynx is clear. No oropharyngeal exudate.   Eyes:      General: No scleral icterus.     Conjunctiva/sclera: Conjunctivae normal.   Neck:      Vascular: No carotid bruit.   Cardiovascular:      Rate and Rhythm: Normal rate and regular rhythm.      Heart sounds: No murmur heard.    No friction rub. No gallop.   Pulmonary:      Effort: No respiratory distress.      Breath sounds: No wheezing or rales.   Abdominal:      General: Bowel sounds are normal.      Palpations: Abdomen is soft. There is no mass.      Tenderness: There is no abdominal tenderness. There is no guarding or rebound.   Musculoskeletal:         General: No swelling.      Right lower leg: No edema.      Left lower leg: No edema.   Lymphadenopathy:      Cervical: No cervical adenopathy.   Skin:     Coloration: Skin is not jaundiced.      Findings: No lesion.   Neurological:      General: No focal deficit present.      Mental Status: He is alert and oriented to person, place, and time.   Psychiatric:         Mood and Affect: Mood normal.         Behavior: Behavior normal.         Thought Content: Thought content normal.         Judgment: Judgment normal.            Result Review :                     Assessment and " Plan    Diagnoses and all orders for this visit:    1. Hypertension, essential (Primary)  Assessment & Plan:  We will add doxazosin to see if we get his blood pressure little better control.  Explained the importance of him taking the medication at nighttime.    Orders:  -     doxazosin (Cardura) 2 MG tablet; Take 1 tablet by mouth Every Night.  Dispense: 30 tablet; Refill: 2    2. Stage 3a chronic kidney disease (HCC)  Assessment & Plan:  Reviewed the results of his ultrasound.  Do not think we need to repeat lab work just yet.      3. Pulmonary emphysema, unspecified emphysema type (HCC)  Assessment & Plan:  Currently his respiratory status seems okay on his current medical regimen.  He is followed by pulmonology now as well.        4. Elevated LFTs  Assessment & Plan:  Reviewed previous lab and ultrasound results.  Will order the follow-up ultrasound as recommended by radiologist      5. Abnormal liver ultrasound  -     US Gallbladder; Future    6. Immunization due  -     COVID-19 Vaccine (Pfizer) Gray Cap      Follow Up   No follow-ups on file.  Patient was given instructions and counseling regarding his condition or for health maintenance advice. Please see specific information pulled into the AVS if appropriate.

## 2022-04-09 NOTE — ASSESSMENT & PLAN NOTE
Reviewed previous lab and ultrasound results.  Will order the follow-up ultrasound as recommended by radiologist

## 2022-04-09 NOTE — ASSESSMENT & PLAN NOTE
We will add doxazosin to see if we get his blood pressure little better control.  Explained the importance of him taking the medication at nighttime.

## 2022-04-09 NOTE — ASSESSMENT & PLAN NOTE
Currently his respiratory status seems okay on his current medical regimen.  He is followed by pulmonology now as well.

## 2022-04-11 ENCOUNTER — OFFICE VISIT (OUTPATIENT)
Dept: PULMONOLOGY | Facility: CLINIC | Age: 62
End: 2022-04-11

## 2022-04-11 VITALS
SYSTOLIC BLOOD PRESSURE: 134 MMHG | HEART RATE: 100 BPM | DIASTOLIC BLOOD PRESSURE: 79 MMHG | BODY MASS INDEX: 29.62 KG/M2 | HEIGHT: 69 IN | OXYGEN SATURATION: 96 % | RESPIRATION RATE: 14 BRPM | WEIGHT: 200 LBS

## 2022-04-11 DIAGNOSIS — R91.1 LUNG NODULE: ICD-10-CM

## 2022-04-11 DIAGNOSIS — D72.19 PERIPHERAL EOSINOPHILIA: ICD-10-CM

## 2022-04-11 DIAGNOSIS — R06.00 DYSPNEA, UNSPECIFIED TYPE: Primary | ICD-10-CM

## 2022-04-11 DIAGNOSIS — J43.9 PULMONARY EMPHYSEMA, UNSPECIFIED EMPHYSEMA TYPE: ICD-10-CM

## 2022-04-11 DIAGNOSIS — F17.201 TOBACCO ABUSE, IN REMISSION: ICD-10-CM

## 2022-04-11 PROCEDURE — 99214 OFFICE O/P EST MOD 30 MIN: CPT | Performed by: NURSE PRACTITIONER

## 2022-04-11 RX ORDER — ALBUTEROL SULFATE 90 UG/1
2 AEROSOL, METERED RESPIRATORY (INHALATION) EVERY 4 HOURS PRN
Qty: 18 G | Refills: 5 | Status: SHIPPED | OUTPATIENT
Start: 2022-04-11 | End: 2022-08-29 | Stop reason: SDUPTHER

## 2022-04-11 RX ORDER — OMEPRAZOLE 20 MG/1
CAPSULE, DELAYED RELEASE ORAL
Qty: 90 CAPSULE | Refills: 0 | Status: SHIPPED | OUTPATIENT
Start: 2022-04-11 | End: 2022-05-31

## 2022-04-11 RX ORDER — AMLODIPINE BESYLATE 10 MG/1
TABLET ORAL
Qty: 90 TABLET | Refills: 0 | Status: SHIPPED | OUTPATIENT
Start: 2022-04-11 | End: 2022-07-01

## 2022-04-11 RX ORDER — BUDESONIDE, GLYCOPYRROLATE, AND FORMOTEROL FUMARATE 160; 9; 4.8 UG/1; UG/1; UG/1
2 AEROSOL, METERED RESPIRATORY (INHALATION) 2 TIMES DAILY
Qty: 1 EACH | Refills: 5 | COMMUNITY
Start: 2022-04-11 | End: 2022-05-11

## 2022-04-11 RX ORDER — BUDESONIDE, GLYCOPYRROLATE, AND FORMOTEROL FUMARATE 160; 9; 4.8 UG/1; UG/1; UG/1
2 AEROSOL, METERED RESPIRATORY (INHALATION) 2 TIMES DAILY
Qty: 1 EACH | Refills: 5 | Status: SHIPPED | OUTPATIENT
Start: 2022-04-11 | End: 2022-04-11 | Stop reason: SDUPTHER

## 2022-04-28 DIAGNOSIS — R12 HEARTBURN: ICD-10-CM

## 2022-04-28 DIAGNOSIS — I10 HYPERTENSION, ESSENTIAL: ICD-10-CM

## 2022-04-28 RX ORDER — LOSARTAN POTASSIUM AND HYDROCHLOROTHIAZIDE 25; 100 MG/1; MG/1
TABLET ORAL
Qty: 90 TABLET | Refills: 0 | Status: SHIPPED | OUTPATIENT
Start: 2022-04-28 | End: 2022-07-11 | Stop reason: SDUPTHER

## 2022-04-28 RX ORDER — OMEPRAZOLE 20 MG/1
CAPSULE, DELAYED RELEASE ORAL
Qty: 90 CAPSULE | Refills: 0 | OUTPATIENT
Start: 2022-04-28

## 2022-05-28 DIAGNOSIS — R12 HEARTBURN: ICD-10-CM

## 2022-05-31 RX ORDER — OMEPRAZOLE 20 MG/1
CAPSULE, DELAYED RELEASE ORAL
Qty: 90 CAPSULE | Refills: 0 | Status: SHIPPED | OUTPATIENT
Start: 2022-05-31 | End: 2022-08-17 | Stop reason: SDUPTHER

## 2022-07-01 DIAGNOSIS — I10 HYPERTENSION, ESSENTIAL: ICD-10-CM

## 2022-07-01 RX ORDER — AMLODIPINE BESYLATE 10 MG/1
TABLET ORAL
Qty: 90 TABLET | Refills: 0 | Status: SHIPPED | OUTPATIENT
Start: 2022-07-01 | End: 2022-07-11 | Stop reason: SDUPTHER

## 2022-07-01 RX ORDER — DOXAZOSIN 2 MG/1
TABLET ORAL
Qty: 30 TABLET | Refills: 0 | Status: SHIPPED | OUTPATIENT
Start: 2022-07-01 | End: 2022-07-11 | Stop reason: SDUPTHER

## 2022-07-07 DIAGNOSIS — I10 HYPERTENSION, ESSENTIAL: ICD-10-CM

## 2022-07-07 RX ORDER — LOSARTAN POTASSIUM AND HYDROCHLOROTHIAZIDE 25; 100 MG/1; MG/1
TABLET ORAL
Qty: 90 TABLET | Refills: 0 | OUTPATIENT
Start: 2022-07-07

## 2022-07-11 ENCOUNTER — LAB (OUTPATIENT)
Dept: LAB | Facility: HOSPITAL | Age: 62
End: 2022-07-11

## 2022-07-11 ENCOUNTER — OFFICE VISIT (OUTPATIENT)
Dept: FAMILY MEDICINE CLINIC | Age: 62
End: 2022-07-11

## 2022-07-11 ENCOUNTER — TELEPHONE (OUTPATIENT)
Dept: FAMILY MEDICINE CLINIC | Age: 62
End: 2022-07-11

## 2022-07-11 VITALS
SYSTOLIC BLOOD PRESSURE: 139 MMHG | WEIGHT: 202 LBS | BODY MASS INDEX: 29.92 KG/M2 | HEART RATE: 77 BPM | HEIGHT: 69 IN | OXYGEN SATURATION: 95 % | DIASTOLIC BLOOD PRESSURE: 88 MMHG

## 2022-07-11 DIAGNOSIS — N18.31 STAGE 3A CHRONIC KIDNEY DISEASE: ICD-10-CM

## 2022-07-11 DIAGNOSIS — K76.9 LIVER DISEASE, UNSPECIFIED: ICD-10-CM

## 2022-07-11 DIAGNOSIS — R93.2 ABNORMAL LIVER ULTRASOUND: ICD-10-CM

## 2022-07-11 DIAGNOSIS — I35.0 AORTIC VALVE STENOSIS, ETIOLOGY OF CARDIAC VALVE DISEASE UNSPECIFIED: ICD-10-CM

## 2022-07-11 DIAGNOSIS — J43.9 PULMONARY EMPHYSEMA, UNSPECIFIED EMPHYSEMA TYPE: ICD-10-CM

## 2022-07-11 DIAGNOSIS — I35.1 NONRHEUMATIC AORTIC VALVE INSUFFICIENCY: ICD-10-CM

## 2022-07-11 DIAGNOSIS — I10 HYPERTENSION, ESSENTIAL: Primary | ICD-10-CM

## 2022-07-11 LAB
ALBUMIN SERPL-MCNC: 4.5 G/DL (ref 3.5–5.2)
ALBUMIN/GLOB SERPL: 1.7 G/DL
ALP SERPL-CCNC: 48 U/L (ref 39–117)
ALT SERPL W P-5'-P-CCNC: 35 U/L (ref 1–41)
ANION GAP SERPL CALCULATED.3IONS-SCNC: 9.8 MMOL/L (ref 5–15)
AST SERPL-CCNC: 26 U/L (ref 1–40)
BASOPHILS # BLD AUTO: 0.05 10*3/MM3 (ref 0–0.2)
BASOPHILS NFR BLD AUTO: 0.6 % (ref 0–1.5)
BILIRUB SERPL-MCNC: 0.6 MG/DL (ref 0–1.2)
BUN SERPL-MCNC: 19 MG/DL (ref 8–23)
BUN/CREAT SERPL: 12.3 (ref 7–25)
CALCIUM SPEC-SCNC: 9.2 MG/DL (ref 8.6–10.5)
CHLORIDE SERPL-SCNC: 98 MMOL/L (ref 98–107)
CO2 SERPL-SCNC: 31.2 MMOL/L (ref 22–29)
CREAT SERPL-MCNC: 1.55 MG/DL (ref 0.76–1.27)
DEPRECATED RDW RBC AUTO: 40.3 FL (ref 37–54)
EGFRCR SERPLBLD CKD-EPI 2021: 50 ML/MIN/1.73
EOSINOPHIL # BLD AUTO: 0.38 10*3/MM3 (ref 0–0.4)
EOSINOPHIL NFR BLD AUTO: 4.7 % (ref 0.3–6.2)
ERYTHROCYTE [DISTWIDTH] IN BLOOD BY AUTOMATED COUNT: 11.5 % (ref 12.3–15.4)
GGT SERPL-CCNC: 16 U/L (ref 8–61)
GLOBULIN UR ELPH-MCNC: 2.7 GM/DL
GLUCOSE SERPL-MCNC: 95 MG/DL (ref 65–99)
HCT VFR BLD AUTO: 45.1 % (ref 37.5–51)
HGB BLD-MCNC: 15.9 G/DL (ref 13–17.7)
IMM GRANULOCYTES # BLD AUTO: 0.02 10*3/MM3 (ref 0–0.05)
IMM GRANULOCYTES NFR BLD AUTO: 0.2 % (ref 0–0.5)
LYMPHOCYTES # BLD AUTO: 2.14 10*3/MM3 (ref 0.7–3.1)
LYMPHOCYTES NFR BLD AUTO: 26.7 % (ref 19.6–45.3)
MCH RBC QN AUTO: 33.6 PG (ref 26.6–33)
MCHC RBC AUTO-ENTMCNC: 35.3 G/DL (ref 31.5–35.7)
MCV RBC AUTO: 95.3 FL (ref 79–97)
MONOCYTES # BLD AUTO: 0.73 10*3/MM3 (ref 0.1–0.9)
MONOCYTES NFR BLD AUTO: 9.1 % (ref 5–12)
NEUTROPHILS NFR BLD AUTO: 4.71 10*3/MM3 (ref 1.7–7)
NEUTROPHILS NFR BLD AUTO: 58.7 % (ref 42.7–76)
PLATELET # BLD AUTO: 162 10*3/MM3 (ref 140–450)
PMV BLD AUTO: 10.6 FL (ref 6–12)
POTASSIUM SERPL-SCNC: 3.9 MMOL/L (ref 3.5–5.2)
PROT SERPL-MCNC: 7.2 G/DL (ref 6–8.5)
RBC # BLD AUTO: 4.73 10*6/MM3 (ref 4.14–5.8)
SODIUM SERPL-SCNC: 139 MMOL/L (ref 136–145)
WBC NRBC COR # BLD: 8.03 10*3/MM3 (ref 3.4–10.8)

## 2022-07-11 PROCEDURE — 80053 COMPREHEN METABOLIC PANEL: CPT | Performed by: FAMILY MEDICINE

## 2022-07-11 PROCEDURE — 82977 ASSAY OF GGT: CPT | Performed by: FAMILY MEDICINE

## 2022-07-11 PROCEDURE — 85025 COMPLETE CBC W/AUTO DIFF WBC: CPT | Performed by: FAMILY MEDICINE

## 2022-07-11 PROCEDURE — 99214 OFFICE O/P EST MOD 30 MIN: CPT | Performed by: FAMILY MEDICINE

## 2022-07-11 PROCEDURE — 36415 COLL VENOUS BLD VENIPUNCTURE: CPT | Performed by: FAMILY MEDICINE

## 2022-07-11 RX ORDER — AMLODIPINE BESYLATE 10 MG/1
10 TABLET ORAL DAILY
Qty: 90 TABLET | Refills: 0 | Status: SHIPPED | OUTPATIENT
Start: 2022-07-11 | End: 2023-01-04

## 2022-07-11 RX ORDER — BUDESONIDE, GLYCOPYRROLATE, AND FORMOTEROL FUMARATE 160; 9; 4.8 UG/1; UG/1; UG/1
AEROSOL, METERED RESPIRATORY (INHALATION)
COMMUNITY
Start: 2022-05-28 | End: 2022-08-29 | Stop reason: SDUPTHER

## 2022-07-11 RX ORDER — LOSARTAN POTASSIUM AND HYDROCHLOROTHIAZIDE 25; 100 MG/1; MG/1
1 TABLET ORAL DAILY
Qty: 90 TABLET | Refills: 0 | Status: SHIPPED | OUTPATIENT
Start: 2022-07-11 | End: 2022-11-16 | Stop reason: SDUPTHER

## 2022-07-11 RX ORDER — DOXAZOSIN 2 MG/1
2 TABLET ORAL NIGHTLY
Qty: 90 TABLET | Refills: 0 | Status: SHIPPED | OUTPATIENT
Start: 2022-07-11 | End: 2022-10-10

## 2022-07-11 NOTE — PROGRESS NOTES
Chief Complaint  Chronic Kidney Disease, Hypertension (2 month ), Emphysema (Is not using any inhalers ), and abnormal liver ultrasound    Subjective          Chad Oneill presents to [facility]  History of Present Illness  Mr. Oneill here to follow-up on his chronic health issues.  Reviewed the recent pulmonology note  He says that he is not using the inhaler as directed.  Says he is doing fine without the inhaler and he thinks it might contribute to a headache.  Encouraged him to contact pulmonology and discuss his decision to discontinue use of the medication.  He he should not manipulate medications on his own but do it in concert with the healthcare professional.  Also see that a CT scan of the chest was ordered and he failed to follow through with that.  Nor did he get the 6-minute walk test or pulmonary function study.    His blood pressure looks okay today.  He says he is taking all of his blood pressure medicines and tolerating them well.        Current Outpatient Medications   Medication Sig Dispense Refill   • amLODIPine (NORVASC) 10 MG tablet Take 1 tablet by mouth Daily. 90 tablet 0   • doxazosin (CARDURA) 2 MG tablet Take 1 tablet by mouth Every Night. 90 tablet 0   • losartan-hydrochlorothiazide (HYZAAR) 100-25 MG per tablet Take 1 tablet by mouth Daily. 90 tablet 0   • omeprazole (priLOSEC) 20 MG capsule TAKE 1 CAPSULE BY MOUTH ONCE DAILY AS NEEDED 90 capsule 0   • albuterol sulfate  (90 Base) MCG/ACT inhaler Inhale 2 puffs Every 4 (Four) Hours As Needed for Wheezing or Shortness of Air. 18 g 5   • Breztri Aerosphere 160-9-4.8 MCG/ACT aerosol inhaler      • multivitamin with minerals tablet tablet Take 1 tablet by mouth Daily.       No current facility-administered medications for this visit.       Review of Systems         Objective   Vital Signs:   /88 (BP Location: Left arm, Patient Position: Sitting, Cuff Size: Adult)   Pulse 77   Ht 175.3 cm  "(69\")   Wt 91.6 kg (202 lb)   SpO2 95%   BMI 29.83 kg/m²     Physical Exam  Constitutional:       Appearance: Normal appearance.   Neck:      Vascular: No carotid bruit.   Cardiovascular:      Rate and Rhythm: Normal rate and regular rhythm.      Heart sounds: Murmur heard.    Systolic murmur is present with a grade of 4/6.   Diastolic murmur is present with a grade of 2/4.  Pulmonary:      Effort: No respiratory distress.      Breath sounds: No wheezing or rales.   Musculoskeletal:      Right lower leg: No edema.      Left lower leg: No edema.   Lymphadenopathy:      Cervical: No cervical adenopathy.   Neurological:      General: No focal deficit present.      Mental Status: He is alert.   Psychiatric:         Attention and Perception: Attention normal.         Mood and Affect: Mood normal.         Speech: Speech normal.            Result Review :                     Assessment and Plan    Diagnoses and all orders for this visit:    1. Hypertension, essential (Primary)  Comments:  His blood pressure looks okay we will continue current regimen  Orders:  -     CBC & Differential  -     Comprehensive Metabolic Panel  -     losartan-hydrochlorothiazide (HYZAAR) 100-25 MG per tablet; Take 1 tablet by mouth Daily.  Dispense: 90 tablet; Refill: 0  -     doxazosin (CARDURA) 2 MG tablet; Take 1 tablet by mouth Every Night.  Dispense: 90 tablet; Refill: 0  -     amLODIPine (NORVASC) 10 MG tablet; Take 1 tablet by mouth Daily.  Dispense: 90 tablet; Refill: 0    2. Stage 3a chronic kidney disease (HCC)  Comments:  He never did get the ultrasound of his kidneys we will get a look at that.  Repeat labs today.  Orders:  -     Comprehensive Metabolic Panel  -     US Renal Bilateral; Future    3. Abnormal liver ultrasound  Comments:  For some reason has not gotten the follow-up ultrasound of his liver as requested by radiology.  I will reorder the test again  Orders:  -     US Gallbladder; Future  -     Gamma GT    4. Liver " disease, unspecified   Comments:  Check lab and reorder the ultrasound  Orders:  -     Gamma GT    5. Aortic valve stenosis, etiology of cardiac valve disease unspecified  Comments:  Last echocardiogram was on 8/2021.      6. Nonrheumatic aortic valve insufficiency  Comments:  Echo was August/2021    7. Pulmonary emphysema, unspecified emphysema type (HCC)  Comments:  Should follow the directions of pulmonology.  We will contact their office see if we can help facilitate work-up as they directed        Follow Up   No follow-ups on file.  Patient was given instructions and counseling regarding his condition or for health maintenance advice. Please see specific information pulled into the AVS if appropriate.

## 2022-07-11 NOTE — TELEPHONE ENCOUNTER
Patient has not gotten the CT scan  Has not done the PFT  Has not done the 6 min walk test    Please see if Pulm would like us to help  Schedule/arrange    mas

## 2022-07-14 ENCOUNTER — TELEPHONE (OUTPATIENT)
Dept: PULMONOLOGY | Facility: CLINIC | Age: 62
End: 2022-07-14

## 2022-07-14 NOTE — TELEPHONE ENCOUNTER
Patient returned call to office.  Spoke to him about scheduling his CT Chest, 6 minute walk, and PFT.  He is agreeable.  Referral coordinator advised patient can call and schedule.  Patient is agreeable.  Patient states he has been using the Breztri inhaler once per day.  He states he wants to stop it because it causes a headache but it helped his bronchitis from coming back.  Recommended he continue the inhaler until his appointment with HIRAM Finnegan on 08/29/2022 and discuss it with her in more detail during that appointment.  Patient states the headache is not bad and he would like to continue the inhaler.    Invited patient to call back with questions or concerns regarding the diagnostics scheduling or medication concerns.  He verbalized understanding and is agreeable.

## 2022-07-27 ENCOUNTER — HOSPITAL ENCOUNTER (OUTPATIENT)
Dept: ULTRASOUND IMAGING | Facility: HOSPITAL | Age: 62
Discharge: HOME | End: 2022-07-27

## 2022-07-27 ENCOUNTER — HOSPITAL ENCOUNTER (OUTPATIENT)
Dept: CT IMAGING | Facility: HOSPITAL | Age: 62
Discharge: HOME | End: 2022-07-27

## 2022-07-27 DIAGNOSIS — J43.9 PULMONARY EMPHYSEMA, UNSPECIFIED EMPHYSEMA TYPE: ICD-10-CM

## 2022-07-27 DIAGNOSIS — R91.1 LUNG NODULE: ICD-10-CM

## 2022-07-27 DIAGNOSIS — N18.31 STAGE 3A CHRONIC KIDNEY DISEASE: ICD-10-CM

## 2022-07-27 DIAGNOSIS — R06.00 DYSPNEA, UNSPECIFIED TYPE: ICD-10-CM

## 2022-07-27 DIAGNOSIS — F17.201 TOBACCO ABUSE, IN REMISSION: ICD-10-CM

## 2022-07-27 DIAGNOSIS — D72.19 PERIPHERAL EOSINOPHILIA: ICD-10-CM

## 2022-07-27 DIAGNOSIS — R93.2 ABNORMAL LIVER ULTRASOUND: ICD-10-CM

## 2022-07-27 DIAGNOSIS — R91.1 LUNG NODULE: Primary | ICD-10-CM

## 2022-07-27 PROCEDURE — 76775 US EXAM ABDO BACK WALL LIM: CPT

## 2022-07-27 PROCEDURE — 71250 CT THORAX DX C-: CPT

## 2022-07-27 PROCEDURE — 76705 ECHO EXAM OF ABDOMEN: CPT

## 2022-07-29 DIAGNOSIS — R93.429 ABNORMAL RENAL ULTRASOUND: ICD-10-CM

## 2022-07-29 DIAGNOSIS — N18.31 STAGE 3A CHRONIC KIDNEY DISEASE: Primary | ICD-10-CM

## 2022-08-17 DIAGNOSIS — R12 HEARTBURN: ICD-10-CM

## 2022-08-18 RX ORDER — OMEPRAZOLE 20 MG/1
20 CAPSULE, DELAYED RELEASE ORAL DAILY PRN
Qty: 90 CAPSULE | Refills: 0 | Status: SHIPPED | OUTPATIENT
Start: 2022-08-18 | End: 2022-10-10

## 2022-08-18 NOTE — PROGRESS NOTES
Primary Care Provider  Kuldeep Winters MD     Referring Provider  No ref. provider found     Chief Complaint  Emphysema and Lung Nodule    Subjective          History of Presenting Illness  Patient is a 62-year-old male, patient of Dr. Stern who presents for management of emphysema and lung nodule who presents for follow-up visit today.  Patient states that since last visit his breathing is at baseline.  Patient states that he does get short of breath that is worse with exertion, mild to moderate in severity, and improved with rest.  Patient states that he is taking Breztri every day as prescribed and uses albuterol inhaler as needed.  Since last visit patient had chest CT scan completed on 7/27/2022.  Report states 5 mm noncalcified nodule left lower lobe, stable.  No new suspicious lung nodules evident. Patient denies fever, chills, night sweats, swollen glands in the head and neck, unintentional weight loss, hemoptysis, purulent sputum production, dysphagia, chest pain, palpitations, chest tightness, abdominal pain, nausea, vomiting, and diarrhea.  Patient also denies any myalgias, changes in sense of taste and/or smell, sore throat, any other coronavirus or flu-like symptoms.  Patient denies any leg swelling, orthopnea, paroxysmal nocturnal dyspnea.  Patient is able to perform activities of daily living.        Review of Systems   Constitutional: Negative for activity change, appetite change, chills, diaphoresis, fatigue, fever, unexpected weight gain and unexpected weight loss.        Negative for Insomnia   HENT: Negative for congestion (Nasal), mouth sores, nosebleeds, postnasal drip, sore throat, swollen glands and trouble swallowing.         Negative for Thrush  Negative for Hoarseness  Negative for Allergies/Hay Fever  Negative for Recent Head injury  Negative for Ear Fullness  Negative for Nasal or Sinus pain  Negative for Dry lips  Negative for Nasal discharge   Respiratory: Positive for  shortness of breath. Negative for apnea, cough, chest tightness and wheezing.         Negative for Hemoptysis  Negative for Pleuritic pain   Cardiovascular: Negative for chest pain, palpitations and leg swelling.        Negative for Claudication  Negative for Cyanosis  Negative for Dyspnea on exertion   Gastrointestinal: Negative for abdominal pain, diarrhea, nausea, vomiting and GERD.   Musculoskeletal: Negative for joint swelling and myalgias.        Negative for Joint pain  Negative for Joint stiffness   Skin: Negative for color change, dry skin, pallor and rash.   Neurological: Negative for syncope, weakness and headache.   Hematological: Negative for adenopathy. Does not bruise/bleed easily.        Family History   Problem Relation Age of Onset   • Coronary artery disease Mother    • Pulmonary embolism Mother         POST OP   • Heart attack Brother    • Prostate cancer Brother    • COPD Brother    • Alcohol abuse Maternal Grandfather         Social History     Socioeconomic History   • Marital status:    • Number of children: 2   Tobacco Use   • Smoking status: Former Smoker     Packs/day: 0.25     Years: 30.00     Pack years: 7.50     Types: Cigarettes     Start date:      Quit date:      Years since quittin.6   • Smokeless tobacco: Former User     Types: Chew   Vaping Use   • Vaping Use: Never used   Substance and Sexual Activity   • Alcohol use: Not Currently     Comment: non-drinker   • Drug use: Never     Comment: none   • Sexual activity: Defer        Past Medical History:   Diagnosis Date   • Acute bronchitis, unspecified    • Acute upper respiratory infection, unspecified    • Anxiety disorder, unspecified    • Anxiety with depression    • Chronic kidney disease, stage III (moderate) ([facility])    • Colon polyp    • Cough    • Dizziness and giddiness    • Emphysema, unspecified ([facility])    • Fatigue    • GERD (gastroesophageal reflux disease)    • Heartburn    • Hypertension    • Mild  intermittent acute bronchitis with asthma with acute exacerbation    • Nonrheumatic aortic (valve) stenosis    • Vision problem         Immunization History   Administered Date(s) Administered   • COVID-19 (PFIZER) PURPLE CAP 04/01/2021, 04/22/2021   • Covid-19 (Pfizer) Gray Cap 04/08/2022   • Flu Vaccine Intradermal Quad 18-64YR 01/03/2013   • Flu Vaccine Quad PF >36MO 11/27/2015   • FluLaval/Fluzone >6mos 09/20/2021   • Influenza Quad Vaccine (Inpatient) 11/01/2013   • Influenza, Unspecified 09/24/2020   • PPD Test 04/24/2015   • Pneumococcal Polysaccharide (PPSV23) 02/24/2020   • Tdap 09/20/2021       Allergies   Allergen Reactions   • Benadryl [Diphenhydramine] Palpitations          Current Outpatient Medications:   •  albuterol sulfate  (90 Base) MCG/ACT inhaler, Inhale 2 puffs Every 4 (Four) Hours As Needed for Wheezing or Shortness of Air., Disp: 6.7 g, Rfl: 5  •  amLODIPine (NORVASC) 10 MG tablet, Take 1 tablet by mouth Daily., Disp: 90 tablet, Rfl: 0  •  Breztri Aerosphere 160-9-4.8 MCG/ACT aerosol inhaler, Inhale 2 puffs by mouth 2 (Two) Times a Day. Rinse mouth out after each use, Disp: 10.7 g, Rfl: 11  •  doxazosin (CARDURA) 2 MG tablet, Take 1 tablet by mouth Every Night., Disp: 90 tablet, Rfl: 0  •  losartan-hydrochlorothiazide (HYZAAR) 100-25 MG per tablet, Take 1 tablet by mouth Daily., Disp: 90 tablet, Rfl: 0  •  omeprazole (priLOSEC) 20 MG capsule, TAKE 1 CAPSULE BY MOUTH ONCE DAILY AS NEEDED, Disp: 90 capsule, Rfl: 0     Objective     Physical Exam  Vital Signs:   WDWN, Alert, NAD.    HEENT:  PERRL, EOMI.  OP, nares clear, no sinus tenderness  Neck:  Supple, no JVD, no thyromegaly.  Lymph: no axillary, cervical, supraclavicular lymphadenopathy noted bilaterally  Chest:  good aeration, clear to auscultation bilaterally, tympanic to percussion bilaterally, no work of breathing noted  CV: RRR, no MGR, pulses 2+, equal.  Abd:  Soft, NT, ND, + BS, no HSM  EXT:  no clubbing, no cyanosis, no  "edema, no joint tenderness  Neuro:  A&Ox3, CN grossly intact, no focal deficits.  Skin: No rashes or lesions noted.    /76 (BP Location: Right arm, Patient Position: Sitting, Cuff Size: Adult)   Pulse 75   Temp 98.7 °F (37.1 °C) (Temporal)   Resp 18   Ht 175.3 cm (69\")   Wt 91.4 kg (201 lb 9.6 oz)   SpO2 97% Comment: room air  BMI 29.77 kg/m²         Result Review :   I have reviewed my last office visit note. I also reviewed chest CT report dated from 7/27/22.    Procedures:         Assessment and Plan      Assessment:  1.  Lung nodule. 5 mm solitary lung nodule right upper lobe.  Stable on last chest CT scan..  2.  Dyspnea.        3.  Emphysema.  Likely does have some COPD versus asthma. The patient refuses  PFTs at this time.  His largest component is asthma and has a component of  overlap syndrome.  4. Peripheral eosinophilia.    5. Tobacco abuse with cigarettes in remission.  Not eligible for lung cancer screening as patient reports that he quit smoking in 2005.        Plan:  1.  Continue Breztri as prescribed and rinse mouth out after each use.  2.  Continue albuterol inhaler as needed.     3.  Patient is advised to avoid burning wood.  Discussed with patient that exposure to wood-burning smoke can cause asthma attacks and bronchitis and also can aggravate heart and lung disease.  4.  Will repeat noncontrast chest CT again in 1 year and his high risk patient.    5.  Patient is scheduled to have pulmonary function test and a 6-minute walk test on 11/17/2022.  6.  Vaccination status: He is up-to-date with flu vaccine and Pneumovax and Covid vaccines, no indication for Prevnar.  Patient is advised to continue to follow CDC recommendations such as social distancing, wearing a mask, and washing hands for least 20 seconds.  7.  Smoking status: patient is a former cigarette smoker.  Not eligible for lung cancer screening as patient reports that he quit smoking in 2005.  8.  Patient to call the office, " 911, or go to the ER with new or worsening symptoms.  9.  Follow-up  in 4 months, sooner if needed.            Follow Up   Return for in December in [place].  Patient was given instructions and counseling regarding his condition or for health maintenance advice. Please see specific information pulled into the AVS if appropriate.

## 2022-08-29 ENCOUNTER — OFFICE VISIT (OUTPATIENT)
Dept: PULMONOLOGY | Facility: CLINIC | Age: 62
End: 2022-08-29

## 2022-08-29 VITALS
WEIGHT: 201.6 LBS | TEMPERATURE: 98.7 F | HEIGHT: 69 IN | SYSTOLIC BLOOD PRESSURE: 142 MMHG | RESPIRATION RATE: 18 BRPM | HEART RATE: 75 BPM | DIASTOLIC BLOOD PRESSURE: 76 MMHG | BODY MASS INDEX: 29.86 KG/M2 | OXYGEN SATURATION: 97 %

## 2022-08-29 DIAGNOSIS — F17.201 TOBACCO ABUSE, IN REMISSION: Primary | ICD-10-CM

## 2022-08-29 DIAGNOSIS — R91.1 LUNG NODULE: ICD-10-CM

## 2022-08-29 DIAGNOSIS — R06.00 DYSPNEA, UNSPECIFIED TYPE: ICD-10-CM

## 2022-08-29 DIAGNOSIS — J43.9 PULMONARY EMPHYSEMA, UNSPECIFIED EMPHYSEMA TYPE: ICD-10-CM

## 2022-08-29 PROCEDURE — 99213 OFFICE O/P EST LOW 20 MIN: CPT | Performed by: NURSE PRACTITIONER

## 2022-08-29 RX ORDER — ALBUTEROL SULFATE 90 UG/1
2 AEROSOL, METERED RESPIRATORY (INHALATION) EVERY 4 HOURS PRN
Qty: 6.7 G | Refills: 5 | Status: ON HOLD | OUTPATIENT
Start: 2022-08-29 | End: 2023-03-25

## 2022-08-29 RX ORDER — BUDESONIDE, GLYCOPYRROLATE, AND FORMOTEROL FUMARATE 160; 9; 4.8 UG/1; UG/1; UG/1
2 AEROSOL, METERED RESPIRATORY (INHALATION) 2 TIMES DAILY
Qty: 10.7 G | Refills: 11 | Status: SHIPPED | OUTPATIENT
Start: 2022-08-29 | End: 2022-10-02

## 2022-10-09 DIAGNOSIS — I10 HYPERTENSION, ESSENTIAL: ICD-10-CM

## 2022-10-09 DIAGNOSIS — R12 HEARTBURN: ICD-10-CM

## 2022-10-10 ENCOUNTER — OFFICE VISIT (OUTPATIENT)
Dept: FAMILY MEDICINE CLINIC | Age: 62
End: 2022-10-10

## 2022-10-10 ENCOUNTER — LAB (OUTPATIENT)
Dept: LAB | Facility: HOSPITAL | Age: 62
End: 2022-10-10

## 2022-10-10 VITALS
BODY MASS INDEX: 29.83 KG/M2 | SYSTOLIC BLOOD PRESSURE: 128 MMHG | WEIGHT: 201.4 LBS | HEIGHT: 69 IN | TEMPERATURE: 97.5 F | HEART RATE: 67 BPM | DIASTOLIC BLOOD PRESSURE: 65 MMHG

## 2022-10-10 DIAGNOSIS — N18.31 STAGE 3A CHRONIC KIDNEY DISEASE: ICD-10-CM

## 2022-10-10 DIAGNOSIS — Z12.5 SCREENING FOR PROSTATE CANCER: ICD-10-CM

## 2022-10-10 DIAGNOSIS — I10 HYPERTENSION, ESSENTIAL: ICD-10-CM

## 2022-10-10 DIAGNOSIS — J43.9 PULMONARY EMPHYSEMA, UNSPECIFIED EMPHYSEMA TYPE: ICD-10-CM

## 2022-10-10 DIAGNOSIS — Z00.00 MEDICARE ANNUAL WELLNESS VISIT, SUBSEQUENT: Primary | ICD-10-CM

## 2022-10-10 DIAGNOSIS — I35.0 AORTIC VALVE STENOSIS, ETIOLOGY OF CARDIAC VALVE DISEASE UNSPECIFIED: ICD-10-CM

## 2022-10-10 DIAGNOSIS — Z23 NEEDS FLU SHOT: ICD-10-CM

## 2022-10-10 DIAGNOSIS — Z23 NEED FOR VACCINATION: ICD-10-CM

## 2022-10-10 LAB
ALBUMIN SERPL-MCNC: 4.7 G/DL (ref 3.5–5.2)
ALBUMIN/GLOB SERPL: 2.4 G/DL
ALP SERPL-CCNC: 48 U/L (ref 39–117)
ALT SERPL W P-5'-P-CCNC: 38 U/L (ref 1–41)
ANION GAP SERPL CALCULATED.3IONS-SCNC: 10 MMOL/L (ref 5–15)
AST SERPL-CCNC: 27 U/L (ref 1–40)
BASOPHILS # BLD AUTO: 0.06 10*3/MM3 (ref 0–0.2)
BASOPHILS NFR BLD AUTO: 0.7 % (ref 0–1.5)
BILIRUB SERPL-MCNC: 0.4 MG/DL (ref 0–1.2)
BUN SERPL-MCNC: 19 MG/DL (ref 8–23)
BUN/CREAT SERPL: 11.4 (ref 7–25)
CALCIUM SPEC-SCNC: 9 MG/DL (ref 8.6–10.5)
CHLORIDE SERPL-SCNC: 100 MMOL/L (ref 98–107)
CHOLEST SERPL-MCNC: 192 MG/DL (ref 0–200)
CO2 SERPL-SCNC: 31 MMOL/L (ref 22–29)
CREAT SERPL-MCNC: 1.66 MG/DL (ref 0.76–1.27)
DEPRECATED RDW RBC AUTO: 42.1 FL (ref 37–54)
EGFRCR SERPLBLD CKD-EPI 2021: 46 ML/MIN/1.73
EOSINOPHIL # BLD AUTO: 0.5 10*3/MM3 (ref 0–0.4)
EOSINOPHIL NFR BLD AUTO: 6 % (ref 0.3–6.2)
ERYTHROCYTE [DISTWIDTH] IN BLOOD BY AUTOMATED COUNT: 11.7 % (ref 12.3–15.4)
GLOBULIN UR ELPH-MCNC: 2 GM/DL
GLUCOSE SERPL-MCNC: 96 MG/DL (ref 65–99)
HCT VFR BLD AUTO: 44.2 % (ref 37.5–51)
HDLC SERPL-MCNC: 36 MG/DL (ref 40–60)
HGB BLD-MCNC: 15.3 G/DL (ref 13–17.7)
IMM GRANULOCYTES # BLD AUTO: 0.01 10*3/MM3 (ref 0–0.05)
IMM GRANULOCYTES NFR BLD AUTO: 0.1 % (ref 0–0.5)
LDLC SERPL CALC-MCNC: 105 MG/DL (ref 0–100)
LDLC/HDLC SERPL: 2.69 {RATIO}
LYMPHOCYTES # BLD AUTO: 2 10*3/MM3 (ref 0.7–3.1)
LYMPHOCYTES NFR BLD AUTO: 24.1 % (ref 19.6–45.3)
MCH RBC QN AUTO: 33.8 PG (ref 26.6–33)
MCHC RBC AUTO-ENTMCNC: 34.6 G/DL (ref 31.5–35.7)
MCV RBC AUTO: 97.6 FL (ref 79–97)
MONOCYTES # BLD AUTO: 0.77 10*3/MM3 (ref 0.1–0.9)
MONOCYTES NFR BLD AUTO: 9.3 % (ref 5–12)
NEUTROPHILS NFR BLD AUTO: 4.97 10*3/MM3 (ref 1.7–7)
NEUTROPHILS NFR BLD AUTO: 59.8 % (ref 42.7–76)
PLATELET # BLD AUTO: 173 10*3/MM3 (ref 140–450)
PMV BLD AUTO: 11 FL (ref 6–12)
POTASSIUM SERPL-SCNC: 3.7 MMOL/L (ref 3.5–5.2)
PROT SERPL-MCNC: 6.7 G/DL (ref 6–8.5)
RBC # BLD AUTO: 4.53 10*6/MM3 (ref 4.14–5.8)
SODIUM SERPL-SCNC: 141 MMOL/L (ref 136–145)
TRIGL SERPL-MCNC: 296 MG/DL (ref 0–150)
VLDLC SERPL-MCNC: 51 MG/DL (ref 5–40)
WBC NRBC COR # BLD: 8.31 10*3/MM3 (ref 3.4–10.8)

## 2022-10-10 PROCEDURE — G0103 PSA SCREENING: HCPCS | Performed by: FAMILY MEDICINE

## 2022-10-10 PROCEDURE — 1170F FXNL STATUS ASSESSED: CPT | Performed by: FAMILY MEDICINE

## 2022-10-10 PROCEDURE — G0008 ADMIN INFLUENZA VIRUS VAC: HCPCS | Performed by: FAMILY MEDICINE

## 2022-10-10 PROCEDURE — 0124A PR ADM SARSCOV2 30MCG/0.3ML BST: CPT | Performed by: FAMILY MEDICINE

## 2022-10-10 PROCEDURE — 90686 IIV4 VACC NO PRSV 0.5 ML IM: CPT | Performed by: FAMILY MEDICINE

## 2022-10-10 PROCEDURE — 85025 COMPLETE CBC W/AUTO DIFF WBC: CPT | Performed by: FAMILY MEDICINE

## 2022-10-10 PROCEDURE — 91312 COVID-19 (PFIZER) BIVALENT BOOSTER 12+YRS: CPT | Performed by: FAMILY MEDICINE

## 2022-10-10 PROCEDURE — 80053 COMPREHEN METABOLIC PANEL: CPT | Performed by: FAMILY MEDICINE

## 2022-10-10 PROCEDURE — 80061 LIPID PANEL: CPT | Performed by: FAMILY MEDICINE

## 2022-10-10 PROCEDURE — G0439 PPPS, SUBSEQ VISIT: HCPCS | Performed by: FAMILY MEDICINE

## 2022-10-10 PROCEDURE — 36415 COLL VENOUS BLD VENIPUNCTURE: CPT | Performed by: FAMILY MEDICINE

## 2022-10-10 PROCEDURE — 99214 OFFICE O/P EST MOD 30 MIN: CPT | Performed by: FAMILY MEDICINE

## 2022-10-10 PROCEDURE — 1159F MED LIST DOCD IN RCRD: CPT | Performed by: FAMILY MEDICINE

## 2022-10-10 RX ORDER — OMEPRAZOLE 20 MG/1
20 CAPSULE, DELAYED RELEASE ORAL DAILY PRN
Qty: 90 CAPSULE | Refills: 1 | Status: SHIPPED | OUTPATIENT
Start: 2022-10-10 | End: 2023-03-28 | Stop reason: HOSPADM

## 2022-10-10 RX ORDER — DOXAZOSIN 2 MG/1
TABLET ORAL
Qty: 90 TABLET | Refills: 1 | Status: SHIPPED | OUTPATIENT
Start: 2022-10-10

## 2022-10-10 NOTE — ASSESSMENT & PLAN NOTE
His heart murmur is significant.  I am going go ahead and get a repeat echocardiogram on him.  May end up needing him to see cardiology as well pending echo results

## 2022-10-10 NOTE — ASSESSMENT & PLAN NOTE
He has upcoming appointment with nephrology for further evaluation of his CKD and renal ultrasound results

## 2022-10-10 NOTE — PROGRESS NOTES
The ABCs of the Annual Wellness Visit  Subsequent Medicare Wellness Visit    Chief Complaint   Patient presents with   • Medicare Wellness-subsequent      Subjective    History of Present Illness:  Chad Oneill is a 62 y.o. male who presents for a Subsequent Medicare Wellness Visit.    The following portions of the patient's history were reviewed and   updated as appropriate: allergies, current medications, past family history, past medical history, past social history, past surgical history and problem list.    Compared to one year ago, the patient feels his physical   health is the same.    Compared to one year ago, the patient feels his mental   health is the same.    Recent Hospitalizations:  He was not admitted to the hospital during the last year.   Chronic/Acute Health Issues:    His blood pressure looks good today, and he is tolerating his medication.  Not having any lightheadedness or dizziness.  He is taking the doxazosin at nighttime.    We have previously made a referral to nephrology but he has not had an appointment yet.  He has CKD 3 and ultrasound shows findings suggestive of underlying glomerular disease.  He has an appointment on October 31.      Also being followed by pulmonology for his emphysema and found to have a lung nodule as well.  He has 2 inhalers one that he uses twice a day and another one that uses just as needed      He does have known aortic stenosis.  His heart murmur today is significant at 4/6.  Last echocardiogram was in 2020.  He is not having any issues with chest pain, shortness of breath, syncope or near syncope.    Current Medical Providers:  Patient Care Team:  Kuldeep Winters MD as PCP - General (Family Medicine)    Outpatient Medications Prior to Visit   Medication Sig Dispense Refill   • albuterol sulfate  (90 Base) MCG/ACT inhaler Inhale 2 puffs Every 4 (Four) Hours As Needed for Wheezing or Shortness of Air. 6.7 g 5   • amLODIPine (NORVASC) 10 MG tablet  "Take 1 tablet by mouth Daily. 90 tablet 0   • doxazosin (CARDURA) 2 MG tablet Take 1 tablet by mouth Every Night. 90 tablet 0   • losartan-hydrochlorothiazide (HYZAAR) 100-25 MG per tablet Take 1 tablet by mouth Daily. 90 tablet 0   • omeprazole (priLOSEC) 20 MG capsule TAKE 1 CAPSULE BY MOUTH ONCE DAILY AS NEEDED 90 capsule 0     No facility-administered medications prior to visit.       No opioid medication identified on active medication list. I have reviewed chart for other potential  high risk medication/s and harmful drug interactions in the elderly.          Aspirin is not on active medication list.  Aspirin use is not indicated based on review of current medical condition/s. Risk of harm outweighs potential benefits.  .    Patient Active Problem List   Diagnosis   • Stage 3a chronic kidney disease (HCC)   • Hypertension, essential   • Nonrheumatic aortic valve stenosis   • Lung nodule   • Medicare annual wellness visit, subsequent   • Pulmonary emphysema (HCC)   • Elevated LFTs   • Problem related to lifestyle, unspecified   • Need for Tdap vaccination   • Aortic stenosis   • Dyspnea   • Tobacco abuse, in remission   • Peripheral eosinophilia   • Abnormal liver ultrasound   • Nonrheumatic aortic valve insufficiency     Advance Care Planning  Advance Directive is not on file.  ACP discussion was held with the patient during this visit. Patient does not have an advance directive, information provided.          Objective    Vitals:    10/10/22 0932   BP: 128/65   BP Location: Left arm   Patient Position: Sitting   Pulse: 67   Temp: 97.5 °F (36.4 °C)   TempSrc: Oral   Weight: 91.4 kg (201 lb 6.4 oz)   Height: 175.3 cm (69\")     Estimated body mass index is 29.74 kg/m² as calculated from the following:    Height as of this encounter: 175.3 cm (69\").    Weight as of this encounter: 91.4 kg (201 lb 6.4 oz).    BMI is >= 25 and <30. (Overweight) The following options were offered after discussion;: exercise " counseling/recommendations and nutrition counseling/recommendations      Does the patient have evidence of cognitive impairment? He is at baseline, has some intellectual functional disability.    Physical Exam  Vitals and nursing note reviewed.   Constitutional:       General: He is not in acute distress.     Appearance: Normal appearance. He is obese.   HENT:      Right Ear: Tympanic membrane and ear canal normal.      Left Ear: Tympanic membrane and ear canal normal.      Mouth/Throat:      Mouth: Mucous membranes are moist.      Pharynx: Oropharynx is clear. No oropharyngeal exudate.   Eyes:      General: No scleral icterus.     Conjunctiva/sclera: Conjunctivae normal.   Neck:      Vascular: No carotid bruit.   Cardiovascular:      Rate and Rhythm: Normal rate and regular rhythm.      Heart sounds: Murmur heard.    Crescendo systolic murmur is present with a grade of 4/6.    No friction rub. No gallop.      Comments: Transmitted murmur to carotids.  Carotid still has good upstroke  Pulmonary:      Effort: No respiratory distress.      Breath sounds: No wheezing or rales.   Abdominal:      General: Bowel sounds are normal.      Palpations: Abdomen is soft. There is no mass.      Tenderness: There is no abdominal tenderness. There is no guarding or rebound.   Musculoskeletal:         General: No swelling.      Right lower leg: No edema.      Left lower leg: No edema.   Lymphadenopathy:      Cervical: No cervical adenopathy.   Skin:     Coloration: Skin is not jaundiced.      Findings: No lesion.   Neurological:      General: No focal deficit present.      Mental Status: He is alert and oriented to person, place, and time.   Psychiatric:         Mood and Affect: Mood normal.         Behavior: Behavior normal.         Thought Content: Thought content normal.         Judgment: Judgment normal.                 HEALTH RISK ASSESSMENT    Smoking Status:  Social History     Tobacco Use   Smoking Status Former   •  Packs/day: 0.25   • Years: 30.00   • Pack years: 7.50   • Types: Cigarettes   • Start date:    • Quit date:    • Years since quittin.7   Smokeless Tobacco Former   • Types: Chew     Alcohol Consumption:  Social History     Substance and Sexual Activity   Alcohol Use Not Currently    Comment: non-drinker     Fall Risk Screen:    MALU Fall Risk Assessment was completed, and patient is at LOW risk for falls.Assessment completed on:10/10/2022    Depression Screening:  PHQ-2/PHQ-9 Depression Screening 10/10/2022   Retired PHQ-9 Total Score -   Retired Total Score -   Little Interest or Pleasure in Doing Things 0-->not at all   Feeling Down, Depressed or Hopeless 0-->not at all   PHQ-9: Brief Depression Severity Measure Score 0       Health Habits and Functional and Cognitive Screening:  Functional & Cognitive Status 10/10/2022   Do you have difficulty preparing food and eating? No   Do you have difficulty bathing yourself, getting dressed or grooming yourself? No   Do you have difficulty using the toilet? No   Do you have difficulty moving around from place to place? No   Do you have trouble with steps or getting out of a bed or a chair? No   Current Diet Well Balanced Diet   Dental Exam Not up to date        Dental Exam Comment -   Eye Exam Up to date   Exercise (times per week) 7 times per week   Current Exercises Include Yard Work   Do you need help using the phone?  No   Are you deaf or do you have serious difficulty hearing?  No   Do you need help with transportation? No   Do you need help shopping? No   Do you need help preparing meals?  No   Do you need help with housework?  No   Do you need help with laundry? No   Do you need help taking your medications? No   Do you need help managing money? No   Do you ever drive or ride in a car without wearing a seat belt? No   Have you felt unusual stress, anger or loneliness in the last month? No   Who do you live with? Spouse   If you need help, do you have  trouble finding someone available to you? No   Have you been bothered in the last four weeks by sexual problems? No   Do you have difficulty concentrating, remembering or making decisions? No       Age-appropriate Screening Schedule:  Refer to the list below for future screening recommendations based on patient's age, sex and/or medical conditions. Orders for these recommended tests are listed in the plan section. The patient has been provided with a written plan.    Health Maintenance   Topic Date Due   • ZOSTER VACCINE (1 of 2) Never done   • TDAP/TD VACCINES (2 - Td or Tdap) 09/20/2031   • INFLUENZA VACCINE  Completed              Assessment & Plan   CMS Preventative Services Quick Reference  Risk Factors Identified During Encounter  Inactivity/Sedentary  Obesity/Overweight   The above risks/problems have been discussed with the patient.  Follow up actions/plans if indicated are seen below in the Assessment/Plan Section.  Pertinent information has been shared with the patient in the After Visit Summary.    Diagnoses and all orders for this visit:    1. Medicare annual wellness visit, subsequent (Primary)  Assessment & Plan:  We reviewed the preventive service recommendations and created an individualized handout      2. Hypertension, essential  Assessment & Plan:  Blood pressure looks okay continue on current regimen    Orders:  -     Comprehensive Metabolic Panel  -     Lipid Panel  -     CBC & Differential    3. Pulmonary emphysema, unspecified emphysema type (HCC)  -     Budeson-Glycopyrrol-Formoterol (BREZTRI) 160-9-4.8 MCG/ACT aerosol inhaler; Inhale 2 puffs 2 (Two) Times a Day.  Dispense: 10.7 g; Refill: 0    4. Stage 3a chronic kidney disease (HCC)  Assessment & Plan:  He has upcoming appointment with nephrology for further evaluation of his CKD and renal ultrasound results    Orders:  -     Comprehensive Metabolic Panel    5. Aortic valve stenosis, etiology of cardiac valve disease unspecified  Assessment  & Plan:  His heart murmur is significant.  I am going go ahead and get a repeat echocardiogram on him.  May end up needing him to see cardiology as well pending echo results    Orders:  -     Adult Transthoracic Echo Complete W/ Cont if Necessary Per Protocol; Future    6. Screening for prostate cancer  -     PSA Screen    7. Need for vaccination  -     COVID-19 Bivalent Booster (Pfizer) 12+yrs    8. Needs flu shot  -     FluLaval/Fluarix/Fluzone >6 Months      Follow Up:   No follow-ups on file.     An After Visit Summary and PPPS were made available to the patient.

## 2022-10-11 LAB — PSA SERPL-MCNC: 1.75 NG/ML (ref 0–4)

## 2022-10-19 ENCOUNTER — HOSPITAL ENCOUNTER (OUTPATIENT)
Dept: CARDIOLOGY | Facility: HOSPITAL | Age: 62
Discharge: HOME | End: 2022-10-19
Admitting: FAMILY MEDICINE

## 2022-10-19 ENCOUNTER — HOSPITAL ENCOUNTER (EMERGENCY)
Facility: HOSPITAL | Age: 62
End: 2022-10-19

## 2022-10-19 DIAGNOSIS — I35.0 AORTIC VALVE STENOSIS, ETIOLOGY OF CARDIAC VALVE DISEASE UNSPECIFIED: ICD-10-CM

## 2022-10-19 LAB
BH CV ECHO MEAS - AI P1/2T: 335 MSEC
BH CV ECHO MEAS - AO MAX PG: 22 MMHG
BH CV ECHO MEAS - AO MEAN PG: 13 MMHG
BH CV ECHO MEAS - AO ROOT DIAM: 3.2 CM
BH CV ECHO MEAS - AO V2 MAX: 232 CM/SEC
BH CV ECHO MEAS - AO V2 VTI: 50 CM
BH CV ECHO MEAS - EF(MOD-BP): 65 %
BH CV ECHO MEAS - IVSD: 1.1 CM
BH CV ECHO MEAS - LA DIMENSION: 3.5 CM
BH CV ECHO MEAS - LAT PEAK E' VEL: 12 CM/SEC
BH CV ECHO MEAS - LV MAX PG: 2.6 MMHG
BH CV ECHO MEAS - LV MEAN PG: 1.3 MMHG
BH CV ECHO MEAS - LV V1 MAX: 80 CM/SEC
BH CV ECHO MEAS - LV V1 VTI: 18.8 CM
BH CV ECHO MEAS - LVIDD: 5 CM
BH CV ECHO MEAS - LVIDS: 3.2 CM
BH CV ECHO MEAS - LVOT DIAM: 2.4 CM
BH CV ECHO MEAS - LVPWD: 1.1 CM
BH CV ECHO MEAS - MED PEAK E' VEL: 8.77 CM/SEC
BH CV ECHO MEAS - MR MAX PG: 90 MMHG
BH CV ECHO MEAS - MR MAX VEL: 473 CM/SEC
BH CV ECHO MEAS - MR MEAN PG: 57 MMHG
BH CV ECHO MEAS - MR MEAN VEL: 363 CM/SEC
BH CV ECHO MEAS - MR VTI: 157 CM
BH CV ECHO MEAS - MV A MAX VEL: 76.8 CM/SEC
BH CV ECHO MEAS - MV DEC TIME: 246 MSEC
BH CV ECHO MEAS - MV E MAX VEL: 74.4 CM/SEC
BH CV ECHO MEAS - MV E/A: 1
BH CV ECHO MEAS - RVDD: 3.1 CM
BH CV ECHO MEASUREMENTS AVERAGE E/E' RATIO: 7.16
MAXIMAL PREDICTED HEART RATE: 157 BPM
STRESS TARGET HR: 133 BPM

## 2022-10-19 PROCEDURE — 93306 TTE W/DOPPLER COMPLETE: CPT | Performed by: INTERNAL MEDICINE

## 2022-10-19 PROCEDURE — 93306 TTE W/DOPPLER COMPLETE: CPT

## 2022-11-16 DIAGNOSIS — I10 HYPERTENSION, ESSENTIAL: ICD-10-CM

## 2022-11-16 RX ORDER — LOSARTAN POTASSIUM AND HYDROCHLOROTHIAZIDE 25; 100 MG/1; MG/1
1 TABLET ORAL DAILY
Qty: 90 TABLET | Refills: 1 | Status: SHIPPED | OUTPATIENT
Start: 2022-11-16 | End: 2022-11-28

## 2022-11-17 ENCOUNTER — APPOINTMENT (OUTPATIENT)
Dept: RESPIRATORY THERAPY | Facility: HOSPITAL | Age: 62
End: 2022-11-17

## 2022-11-17 ENCOUNTER — PROCEDURE VISIT (OUTPATIENT)
Dept: CARDIAC REHAB | Facility: HOSPITAL | Age: 62
End: 2022-11-17

## 2022-11-17 ENCOUNTER — HOSPITAL ENCOUNTER (OUTPATIENT)
Dept: RESPIRATORY THERAPY | Facility: HOSPITAL | Age: 62
Discharge: HOME | End: 2022-11-17

## 2022-11-17 DIAGNOSIS — J43.9 PULMONARY EMPHYSEMA, UNSPECIFIED EMPHYSEMA TYPE: ICD-10-CM

## 2022-11-17 DIAGNOSIS — D72.19 PERIPHERAL EOSINOPHILIA: ICD-10-CM

## 2022-11-17 DIAGNOSIS — R06.00 DYSPNEA, UNSPECIFIED TYPE: ICD-10-CM

## 2022-11-17 DIAGNOSIS — F17.201 TOBACCO ABUSE, IN REMISSION: ICD-10-CM

## 2022-11-17 DIAGNOSIS — R91.1 LUNG NODULE: ICD-10-CM

## 2022-11-17 PROCEDURE — 94060 EVALUATION OF WHEEZING: CPT

## 2022-11-17 PROCEDURE — 94729 DIFFUSING CAPACITY: CPT | Performed by: INTERNAL MEDICINE

## 2022-11-17 PROCEDURE — 94726 PLETHYSMOGRAPHY LUNG VOLUMES: CPT | Performed by: INTERNAL MEDICINE

## 2022-11-17 PROCEDURE — 94729 DIFFUSING CAPACITY: CPT

## 2022-11-17 PROCEDURE — 94618 PULMONARY STRESS TESTING: CPT

## 2022-11-17 PROCEDURE — 94060 EVALUATION OF WHEEZING: CPT | Performed by: INTERNAL MEDICINE

## 2022-11-17 PROCEDURE — 94726 PLETHYSMOGRAPHY LUNG VOLUMES: CPT

## 2022-11-17 RX ORDER — ALBUTEROL SULFATE 2.5 MG/3ML
2.5 SOLUTION RESPIRATORY (INHALATION) ONCE
Status: COMPLETED | OUTPATIENT
Start: 2022-11-17 | End: 2022-11-17

## 2022-11-17 RX ADMIN — ALBUTEROL SULFATE 2.5 MG: 2.5 SOLUTION RESPIRATORY (INHALATION) at 13:51

## 2022-11-26 DIAGNOSIS — I10 HYPERTENSION, ESSENTIAL: ICD-10-CM

## 2022-11-28 RX ORDER — LOSARTAN POTASSIUM AND HYDROCHLOROTHIAZIDE 25; 100 MG/1; MG/1
TABLET ORAL
Qty: 90 TABLET | Refills: 1 | Status: SHIPPED | OUTPATIENT
Start: 2022-11-28 | End: 2023-03-28 | Stop reason: HOSPADM

## 2022-12-08 ENCOUNTER — TELEPHONE (OUTPATIENT)
Dept: PULMONOLOGY | Facility: CLINIC | Age: 62
End: 2022-12-08

## 2022-12-08 NOTE — TELEPHONE ENCOUNTER
PATIENT CAME UP TO [facility] ASKING ABOUT TEST RESULTS ON PFT AND SIX MINUTE WALK.  HE IS ASKING IF HE SHOULD CONTINUE ALL HIS CURRENT MEDICATIONS.    HE ASKED THAT SOMEONE CONTACT HIM REGARDING THOSE RESULTS.

## 2022-12-29 ENCOUNTER — TELEPHONE (OUTPATIENT)
Dept: FAMILY MEDICINE CLINIC | Age: 62
End: 2022-12-29

## 2022-12-29 NOTE — TELEPHONE ENCOUNTER
You are correct.  Since he had the repeat ultrasound on July 11 he does not need further evaluation.    mas

## 2022-12-29 NOTE — TELEPHONE ENCOUNTER
Had reminder in computer to schedule 1 yr follow up on Abd US.  He has had several imaging done since then, so unsure if he needs repeat.  Please advise

## 2023-01-04 DIAGNOSIS — I10 HYPERTENSION, ESSENTIAL: ICD-10-CM

## 2023-01-04 RX ORDER — AMLODIPINE BESYLATE 10 MG/1
TABLET ORAL
Qty: 90 TABLET | Refills: 0 | Status: SHIPPED | OUTPATIENT
Start: 2023-01-04

## 2023-01-05 DIAGNOSIS — I10 HYPERTENSION, ESSENTIAL: ICD-10-CM

## 2023-01-05 RX ORDER — AMLODIPINE BESYLATE 10 MG/1
TABLET ORAL
Qty: 90 TABLET | Refills: 0 | OUTPATIENT
Start: 2023-01-05

## 2023-01-16 ENCOUNTER — OFFICE VISIT (OUTPATIENT)
Dept: PULMONOLOGY | Facility: CLINIC | Age: 63
End: 2023-01-16
Payer: MEDICARE

## 2023-01-16 VITALS
OXYGEN SATURATION: 97 % | WEIGHT: 202 LBS | HEIGHT: 69 IN | RESPIRATION RATE: 16 BRPM | HEART RATE: 77 BPM | DIASTOLIC BLOOD PRESSURE: 75 MMHG | BODY MASS INDEX: 29.92 KG/M2 | SYSTOLIC BLOOD PRESSURE: 144 MMHG

## 2023-01-16 DIAGNOSIS — J43.2 CENTRILOBULAR EMPHYSEMA: ICD-10-CM

## 2023-01-16 DIAGNOSIS — Z23 ENCOUNTER FOR IMMUNIZATION: Primary | ICD-10-CM

## 2023-01-16 DIAGNOSIS — R06.09 DOE (DYSPNEA ON EXERTION): ICD-10-CM

## 2023-01-16 DIAGNOSIS — F17.201 TOBACCO ABUSE, IN REMISSION: ICD-10-CM

## 2023-01-16 PROCEDURE — G0009 ADMIN PNEUMOCOCCAL VACCINE: HCPCS | Performed by: INTERNAL MEDICINE

## 2023-01-16 PROCEDURE — 99214 OFFICE O/P EST MOD 30 MIN: CPT | Performed by: INTERNAL MEDICINE

## 2023-01-16 PROCEDURE — 90677 PCV20 VACCINE IM: CPT | Performed by: INTERNAL MEDICINE

## 2023-01-16 NOTE — PROGRESS NOTES
Primary Care Provider  Kuldeep Winters MD     Referring Provider  No ref. provider found     Chief Complaint  Pulmonary emphysema , Lung Nodule, and Follow-up (6 Month )    Subjective          History of Presenting Illness  62-year-old male with COPD here for follow-up.  Since last visit PFTs were done showing mild COPD.  He is doing well on Breztri plus albuterol as needed.  He has annual CT due this summer for follow-up of a 5 mm noncalcified left lower lobe lung nodule.  His respiratory symptoms are doing well on his current inhaler regimen.  He is short of breath walking about 2000 feet up 2 flights assess.  Dyspnea is moderate severity, worse with activity relieved with rest.  He denies any coughing or wheezing. Patient denies fever, chills, night sweats, swollen glands in the head and neck, unintentional weight loss, hemoptysis, purulent sputum production, dysphagia, chest pain, palpitations, chest tightness, abdominal pain, nausea, vomiting, and diarrhea.  Patient also denies any myalgias, changes in sense of taste and/or smell, sore throat, any other coronavirus or flu-like symptoms.  Patient denies any leg swelling, orthopnea, paroxysmal nocturnal dyspnea.  Patient is able to perform activities of daily living.      Review of Systems   Constitutional: Negative for activity change, appetite change, chills, diaphoresis, fatigue, fever, unexpected weight gain and unexpected weight loss.        Negative for Insomnia   HENT: Negative for congestion (Nasal), mouth sores, nosebleeds, postnasal drip, sore throat, swollen glands and trouble swallowing.         Negative for Thrush  Negative for Hoarseness  Negative for Allergies/Hay Fever  Negative for Recent Head injury  Negative for Ear Fullness  Negative for Nasal or Sinus pain  Negative for Dry lips  Negative for Nasal discharge   Respiratory: Positive for shortness of breath. Negative for apnea, cough, chest tightness and wheezing.         Negative for  Hemoptysis  Negative for Pleuritic pain   Cardiovascular: Negative for chest pain, palpitations and leg swelling.        Negative for Claudication  Negative for Cyanosis  Negative for Dyspnea on exertion   Gastrointestinal: Negative for abdominal pain, diarrhea, nausea, vomiting and GERD.   Musculoskeletal: Negative for joint swelling and myalgias.        Negative for Joint pain  Negative for Joint stiffness   Skin: Negative for color change, dry skin, pallor and rash.   Neurological: Negative for syncope, weakness and headache.   Hematological: Negative for adenopathy. Does not bruise/bleed easily.        Family History   Problem Relation Age of Onset   • Coronary artery disease Mother    • Pulmonary embolism Mother         POST OP   • Heart attack Brother    • Prostate cancer Brother    • COPD Brother    • Alcohol abuse Maternal Grandfather         Social History     Socioeconomic History   • Marital status:    • Number of children: 2   Tobacco Use   • Smoking status: Former     Packs/day: 0.25     Years: 30.00     Pack years: 7.50     Types: Cigarettes     Start date:      Quit date:      Years since quittin.0   • Smokeless tobacco: Former     Types: Chew   Vaping Use   • Vaping Use: Never used   Substance and Sexual Activity   • Alcohol use: Not Currently     Comment: non-drinker   • Drug use: Never     Comment: none   • Sexual activity: Defer        Past Medical History:   Diagnosis Date   • Acute bronchitis, unspecified    • Acute upper respiratory infection, unspecified    • Anxiety disorder, unspecified    • Anxiety with depression    • Chronic kidney disease, stage III (moderate) ([facility])    • Colon polyp    • Cough    • Dizziness and giddiness    • Emphysema, unspecified ([facility])    • Fatigue    • GERD (gastroesophageal reflux disease)    • Heartburn    • Hypertension    • Mild intermittent acute bronchitis with asthma with acute exacerbation    • Nonrheumatic aortic (valve) stenosis    •  Vision problem         Immunization History   Administered Date(s) Administered   • COVID-19 (PFIZER) BIVALENT BOOSTER 12+YRS 10/10/2022   • COVID-19 (PFIZER) PURPLE CAP 04/01/2021, 04/22/2021   • Covid-19 (Pfizer) Gray Cap 04/08/2022   • Flu Vaccine Intradermal Quad 18-64YR 01/03/2013   • Flu Vaccine Quad PF >36MO 11/27/2015   • FluLaval/Fluzone >6mos 09/20/2021, 10/10/2022   • Influenza Quad Vaccine (Inpatient) 11/01/2013   • Influenza, Unspecified 09/24/2020   • PPD Test 04/24/2015   • Pneumococcal Conjugate 20-Valent (PCV20) 01/16/2023   • Pneumococcal Polysaccharide (PPSV23) 02/24/2020   • Tdap 09/20/2021       Allergies   Allergen Reactions   • Benadryl [Diphenhydramine] Palpitations          Current Outpatient Medications:   •  albuterol sulfate  (90 Base) MCG/ACT inhaler, Inhale 2 puffs Every 4 (Four) Hours As Needed for Wheezing or Shortness of Air., Disp: 6.7 g, Rfl: 5  •  amLODIPine (NORVASC) 10 MG tablet, Take 1 tablet by mouth once daily, Disp: 90 tablet, Rfl: 0  •  Budeson-Glycopyrrol-Formoterol (BREZTRI) 160-9-4.8 MCG/ACT aerosol inhaler, Inhale 2 puffs 2 (Two) Times a Day., Disp: 10.7 g, Rfl: 0  •  doxazosin (CARDURA) 2 MG tablet, TAKE 1 TABLET BY MOUTH ONCE DAILY AT NIGHT, Disp: 90 tablet, Rfl: 1  •  losartan-hydrochlorothiazide (HYZAAR) 100-25 MG per tablet, Take 1 tablet by mouth once daily, Disp: 90 tablet, Rfl: 1  •  omeprazole (priLOSEC) 20 MG capsule, TAKE 1 CAPSULE BY MOUTH ONCE DAILY AS NEEDED, Disp: 90 capsule, Rfl: 1     Objective     Physical Exam  Vital Signs:   WDWN, Alert, NAD.    HEENT:  PERRL, EOMI.  OP, nares clear  Chest:  good aeration, clear to auscultation bilaterally, tympanic to percussion bilaterally, no work of breathing noted  CV: RRR, no MGR, pulses 2+, equal.  Abd:  Soft, NT, ND, + BS, no HSM  EXT:  no clubbing, no cyanosis, no edema  Neuro:  A&Ox3, CN grossly intact, no focal deficits.  Skin: No rashes or lesions noted.    /75 (BP Location: Left arm, Patient  "Position: Sitting, Cuff Size: Adult)   Pulse 77   Resp 16   Ht 175.3 cm (69\")   Wt 91.6 kg (202 lb)   SpO2 97% Comment: Room air  BMI 29.83 kg/m²         Result Review :   I have reviewed Victor Hugo Chavez's last office note.  July 2022 chesty personally reviewed showing emphysematous changes as well as a 5 mm noncalcified pulmonary nodule that needs follow-up.  PFTs were done showing mild COPD.  FEV1 67% predicted.  Borderline restriction is total capacity was near the lower limit of normal.  Diffuse capacity normal.       Assessment and Plan    Patient Active Problem List   Diagnosis   • Stage 3a chronic kidney disease (HCC)   • Hypertension, essential   • Nonrheumatic aortic valve stenosis   • Lung nodule   • Medicare annual wellness visit, subsequent   • Pulmonary emphysema (HCC)   • Elevated LFTs   • Problem related to lifestyle, unspecified   • Need for Tdap vaccination   • Aortic stenosis   • Dyspnea   • Tobacco abuse, in remission   • Peripheral eosinophilia   • Abnormal liver ultrasound   • Nonrheumatic aortic valve insufficiency       Assessment:  Mild COPD/asthma overlap syndrome.  PFTs with postbronchodilator FEV1 71%.  No desaturation on 6-minute walk test.  Needs alpha-1 testing.  Has known peripheral eosinophilia and elevated FeNO testing in the past.  On triple inhaler therapy.  COPD assessment test score is 8 signifying adequate disease controlled  Solitary lung nodule 5 mm due for 1 year follow-up this summer  Chronic dyspnea baseline  Peripheral eosinophilia.    Tobacco abuse with cigarettes in remission.  Not eligible for lung cancer screening as patient reports that he quit smoking in 2005.    Plan:  With her PFTs and 6-minute walk test results with the patient.  Appears to have mild COPD with asthma overlap features.  Has no alpha-1 testing in the system.  Would check alpha-1 antitrypsin level and genotype in office next visit  Continue Breztri plus albuterol as needed  Has 1 year follow-up " CT already ordered for later this summer.  We will follow-up on this for 2 years or clinical stability in this patient.  Has wood-burning stove noted on previous office visit.  Needs to avoid doing this as this can cause further asthma flares of bronchitis  Up-to-date with flu, COVID-19 vaccinations and Pneumovax.  We will give Prevnar 20 today.  Smoking status: patient is a former cigarette smoker.  Not eligible for lung cancer screening as patient reports that he quit smoking in 2005.    Follow Up   Return in about 8 months (around 9/16/2023).  Patient was given instructions and counseling regarding his condition or for health maintenance advice. Please see specific information pulled into the AVS if appropriate.

## 2023-01-23 ENCOUNTER — TRANSCRIBE ORDERS (OUTPATIENT)
Dept: ADMINISTRATIVE | Facility: HOSPITAL | Age: 63
End: 2023-01-23
Payer: MEDICARE

## 2023-01-23 ENCOUNTER — LAB (OUTPATIENT)
Dept: LAB | Facility: HOSPITAL | Age: 63
End: 2023-01-23
Payer: MEDICARE

## 2023-01-23 ENCOUNTER — CLINICAL SUPPORT (OUTPATIENT)
Dept: FAMILY MEDICINE CLINIC | Age: 63
End: 2023-01-23
Payer: MEDICARE

## 2023-01-23 VITALS — DIASTOLIC BLOOD PRESSURE: 80 MMHG | HEART RATE: 88 BPM | SYSTOLIC BLOOD PRESSURE: 124 MMHG

## 2023-01-23 DIAGNOSIS — N18.31 CHRONIC KIDNEY DISEASE (CKD) STAGE G3A/A1, MODERATELY DECREASED GLOMERULAR FILTRATION RATE (GFR) BETWEEN 45-59 ML/MIN/1.73 SQUARE METER AND ALBUMINURIA CREATININE RATIO LESS THAN 30 MG/G (CMS/H*: ICD-10-CM

## 2023-01-23 DIAGNOSIS — N18.31 CHRONIC KIDNEY DISEASE (CKD) STAGE G3A/A1, MODERATELY DECREASED GLOMERULAR FILTRATION RATE (GFR) BETWEEN 45-59 ML/MIN/1.73 SQUARE METER AND ALBUMINURIA CREATININE RATIO LESS THAN 30 MG/G (CMS/H*: Primary | ICD-10-CM

## 2023-01-23 LAB
ALBUMIN SERPL-MCNC: 4.5 G/DL (ref 3.5–5.2)
ANION GAP SERPL CALCULATED.3IONS-SCNC: 8.7 MMOL/L (ref 5–15)
BACTERIA UR QL AUTO: ABNORMAL /HPF
BASOPHILS # BLD AUTO: 0.06 10*3/MM3 (ref 0–0.2)
BASOPHILS NFR BLD AUTO: 0.8 % (ref 0–1.5)
BILIRUB UR QL STRIP: NEGATIVE
BUN SERPL-MCNC: 22 MG/DL (ref 8–23)
BUN/CREAT SERPL: 12.5 (ref 7–25)
CALCIUM SPEC-SCNC: 9.8 MG/DL (ref 8.6–10.5)
CHLORIDE SERPL-SCNC: 101 MMOL/L (ref 98–107)
CLARITY UR: CLEAR
CO2 SERPL-SCNC: 31.3 MMOL/L (ref 22–29)
COLOR UR: YELLOW
CREAT SERPL-MCNC: 1.76 MG/DL (ref 0.76–1.27)
CREAT UR-MCNC: 220 MG/DL
DEPRECATED RDW RBC AUTO: 42.4 FL (ref 37–54)
EGFRCR SERPLBLD CKD-EPI 2021: 42.9 ML/MIN/1.73
EOSINOPHIL # BLD AUTO: 0.34 10*3/MM3 (ref 0–0.4)
EOSINOPHIL NFR BLD AUTO: 4.5 % (ref 0.3–6.2)
ERYTHROCYTE [DISTWIDTH] IN BLOOD BY AUTOMATED COUNT: 11.9 % (ref 12.3–15.4)
GLUCOSE SERPL-MCNC: 101 MG/DL (ref 65–99)
GLUCOSE UR STRIP-MCNC: NEGATIVE MG/DL
HCT VFR BLD AUTO: 44.2 % (ref 37.5–51)
HGB BLD-MCNC: 15.3 G/DL (ref 13–17.7)
HGB UR QL STRIP.AUTO: ABNORMAL
IMM GRANULOCYTES # BLD AUTO: 0.02 10*3/MM3 (ref 0–0.05)
IMM GRANULOCYTES NFR BLD AUTO: 0.3 % (ref 0–0.5)
KETONES UR QL STRIP: NEGATIVE
LEUKOCYTE ESTERASE UR QL STRIP.AUTO: NEGATIVE
LYMPHOCYTES # BLD AUTO: 1.84 10*3/MM3 (ref 0.7–3.1)
LYMPHOCYTES NFR BLD AUTO: 24.4 % (ref 19.6–45.3)
MCH RBC QN AUTO: 33.6 PG (ref 26.6–33)
MCHC RBC AUTO-ENTMCNC: 34.6 G/DL (ref 31.5–35.7)
MCV RBC AUTO: 97.1 FL (ref 79–97)
MONOCYTES # BLD AUTO: 0.57 10*3/MM3 (ref 0.1–0.9)
MONOCYTES NFR BLD AUTO: 7.5 % (ref 5–12)
NEUTROPHILS NFR BLD AUTO: 4.72 10*3/MM3 (ref 1.7–7)
NEUTROPHILS NFR BLD AUTO: 62.5 % (ref 42.7–76)
NITRITE UR QL STRIP: NEGATIVE
PH UR STRIP.AUTO: 7 [PH] (ref 5–8)
PHOSPHATE SERPL-MCNC: 4.1 MG/DL (ref 2.5–4.5)
PLATELET # BLD AUTO: 158 10*3/MM3 (ref 140–450)
PMV BLD AUTO: 11 FL (ref 6–12)
POTASSIUM SERPL-SCNC: 4.2 MMOL/L (ref 3.5–5.2)
PROT ?TM UR-MCNC: 29.7 MG/DL
PROT UR QL STRIP: ABNORMAL
PROT/CREAT UR: 0.14 MG/G{CREAT}
RBC # BLD AUTO: 4.55 10*6/MM3 (ref 4.14–5.8)
RBC # UR STRIP: ABNORMAL /HPF
REF LAB TEST METHOD: ABNORMAL
SODIUM SERPL-SCNC: 141 MMOL/L (ref 136–145)
SP GR UR STRIP: 1.02 (ref 1–1.03)
SQUAMOUS #/AREA URNS HPF: ABNORMAL /HPF
UROBILINOGEN UR QL STRIP: ABNORMAL
WBC # UR STRIP: ABNORMAL /HPF
WBC NRBC COR # BLD: 7.55 10*3/MM3 (ref 3.4–10.8)

## 2023-01-23 PROCEDURE — 36415 COLL VENOUS BLD VENIPUNCTURE: CPT

## 2023-01-23 PROCEDURE — 85025 COMPLETE CBC W/AUTO DIFF WBC: CPT

## 2023-01-23 PROCEDURE — 82570 ASSAY OF URINE CREATININE: CPT

## 2023-01-23 PROCEDURE — 84156 ASSAY OF PROTEIN URINE: CPT

## 2023-01-23 PROCEDURE — 83970 ASSAY OF PARATHORMONE: CPT

## 2023-01-23 PROCEDURE — 81001 URINALYSIS AUTO W/SCOPE: CPT

## 2023-01-23 PROCEDURE — 80069 RENAL FUNCTION PANEL: CPT

## 2023-01-24 LAB — PTH-INTACT SERPL-MCNC: 38 PG/ML (ref 15–65)

## 2023-03-14 ENCOUNTER — APPOINTMENT (OUTPATIENT)
Dept: GENERAL RADIOLOGY | Facility: HOSPITAL | Age: 63
End: 2023-03-14
Payer: MEDICARE

## 2023-03-14 ENCOUNTER — HOSPITAL ENCOUNTER (EMERGENCY)
Facility: HOSPITAL | Age: 63
Discharge: HOME | End: 2023-03-14
Attending: EMERGENCY MEDICINE | Admitting: EMERGENCY MEDICINE
Payer: MEDICARE

## 2023-03-14 VITALS
HEART RATE: 65 BPM | OXYGEN SATURATION: 98 % | RESPIRATION RATE: 18 BRPM | TEMPERATURE: 99.4 F | BODY MASS INDEX: 29.58 KG/M2 | SYSTOLIC BLOOD PRESSURE: 112 MMHG | WEIGHT: 199.74 LBS | HEIGHT: 69 IN | DIASTOLIC BLOOD PRESSURE: 68 MMHG

## 2023-03-14 DIAGNOSIS — J20.9 ACUTE BRONCHITIS, UNSPECIFIED ORGANISM: Primary | ICD-10-CM

## 2023-03-14 DIAGNOSIS — G89.29 CHRONIC RIGHT-SIDED LOW BACK PAIN WITHOUT SCIATICA: ICD-10-CM

## 2023-03-14 DIAGNOSIS — M54.50 CHRONIC RIGHT-SIDED LOW BACK PAIN WITHOUT SCIATICA: ICD-10-CM

## 2023-03-14 LAB
ALBUMIN SERPL-MCNC: 3.9 G/DL (ref 3.5–5.2)
ALBUMIN/GLOB SERPL: 1.2 G/DL
ALP SERPL-CCNC: 43 U/L (ref 39–117)
ALT SERPL W P-5'-P-CCNC: 15 U/L (ref 1–41)
ANION GAP SERPL CALCULATED.3IONS-SCNC: 10.4 MMOL/L (ref 5–15)
AST SERPL-CCNC: 17 U/L (ref 1–40)
BASOPHILS # BLD AUTO: 0.03 10*3/MM3 (ref 0–0.2)
BASOPHILS NFR BLD AUTO: 0.3 % (ref 0–1.5)
BILIRUB SERPL-MCNC: 0.7 MG/DL (ref 0–1.2)
BUN SERPL-MCNC: 35 MG/DL (ref 8–23)
BUN/CREAT SERPL: 14.4 (ref 7–25)
CALCIUM SPEC-SCNC: 8.8 MG/DL (ref 8.6–10.5)
CHLORIDE SERPL-SCNC: 101 MMOL/L (ref 98–107)
CO2 SERPL-SCNC: 28.6 MMOL/L (ref 22–29)
CREAT SERPL-MCNC: 2.43 MG/DL (ref 0.76–1.27)
DEPRECATED RDW RBC AUTO: 41.8 FL (ref 37–54)
EGFRCR SERPLBLD CKD-EPI 2021: 29 ML/MIN/1.73
EOSINOPHIL # BLD AUTO: 0.21 10*3/MM3 (ref 0–0.4)
EOSINOPHIL NFR BLD AUTO: 1.8 % (ref 0.3–6.2)
ERYTHROCYTE [DISTWIDTH] IN BLOOD BY AUTOMATED COUNT: 11.7 % (ref 12.3–15.4)
FLUAV AG NPH QL: NEGATIVE
FLUBV AG NPH QL IA: NEGATIVE
GLOBULIN UR ELPH-MCNC: 3.2 GM/DL
GLUCOSE SERPL-MCNC: 101 MG/DL (ref 65–99)
HCT VFR BLD AUTO: 36.1 % (ref 37.5–51)
HGB BLD-MCNC: 12.6 G/DL (ref 13–17.7)
HOLD SPECIMEN: NORMAL
HOLD SPECIMEN: NORMAL
IMM GRANULOCYTES # BLD AUTO: 0.08 10*3/MM3 (ref 0–0.05)
IMM GRANULOCYTES NFR BLD AUTO: 0.7 % (ref 0–0.5)
LYMPHOCYTES # BLD AUTO: 1.45 10*3/MM3 (ref 0.7–3.1)
LYMPHOCYTES NFR BLD AUTO: 12.6 % (ref 19.6–45.3)
MCH RBC QN AUTO: 33.9 PG (ref 26.6–33)
MCHC RBC AUTO-ENTMCNC: 34.9 G/DL (ref 31.5–35.7)
MCV RBC AUTO: 97 FL (ref 79–97)
MONOCYTES # BLD AUTO: 1.29 10*3/MM3 (ref 0.1–0.9)
MONOCYTES NFR BLD AUTO: 11.2 % (ref 5–12)
NEUTROPHILS NFR BLD AUTO: 73.4 % (ref 42.7–76)
NEUTROPHILS NFR BLD AUTO: 8.45 10*3/MM3 (ref 1.7–7)
NRBC BLD AUTO-RTO: 0 /100 WBC (ref 0–0.2)
NT-PROBNP SERPL-MCNC: 484.6 PG/ML (ref 0–900)
PLATELET # BLD AUTO: 184 10*3/MM3 (ref 140–450)
PMV BLD AUTO: 10.9 FL (ref 6–12)
POTASSIUM SERPL-SCNC: 3.7 MMOL/L (ref 3.5–5.2)
PROT SERPL-MCNC: 7.1 G/DL (ref 6–8.5)
RBC # BLD AUTO: 3.72 10*6/MM3 (ref 4.14–5.8)
S PYO AG THROAT QL: NEGATIVE
SODIUM SERPL-SCNC: 140 MMOL/L (ref 136–145)
TROPONIN T SERPL HS-MCNC: 25 NG/L
TROPONIN T SERPL HS-MCNC: 31 NG/L
WBC NRBC COR # BLD: 11.51 10*3/MM3 (ref 3.4–10.8)
WHOLE BLOOD HOLD COAG: NORMAL
WHOLE BLOOD HOLD SPECIMEN: NORMAL

## 2023-03-14 PROCEDURE — 93005 ELECTROCARDIOGRAM TRACING: CPT

## 2023-03-14 PROCEDURE — 36415 COLL VENOUS BLD VENIPUNCTURE: CPT

## 2023-03-14 PROCEDURE — 87081 CULTURE SCREEN ONLY: CPT | Performed by: EMERGENCY MEDICINE

## 2023-03-14 PROCEDURE — C9803 HOPD COVID-19 SPEC COLLECT: HCPCS | Performed by: EMERGENCY MEDICINE

## 2023-03-14 PROCEDURE — 87880 STREP A ASSAY W/OPTIC: CPT | Performed by: EMERGENCY MEDICINE

## 2023-03-14 PROCEDURE — 99284 EMERGENCY DEPT VISIT MOD MDM: CPT

## 2023-03-14 PROCEDURE — U0004 COV-19 TEST NON-CDC HGH THRU: HCPCS | Performed by: EMERGENCY MEDICINE

## 2023-03-14 PROCEDURE — 84484 ASSAY OF TROPONIN QUANT: CPT

## 2023-03-14 PROCEDURE — 85025 COMPLETE CBC W/AUTO DIFF WBC: CPT

## 2023-03-14 PROCEDURE — 80053 COMPREHEN METABOLIC PANEL: CPT

## 2023-03-14 PROCEDURE — 84484 ASSAY OF TROPONIN QUANT: CPT | Performed by: EMERGENCY MEDICINE

## 2023-03-14 PROCEDURE — 87804 INFLUENZA ASSAY W/OPTIC: CPT | Performed by: EMERGENCY MEDICINE

## 2023-03-14 PROCEDURE — 71045 X-RAY EXAM CHEST 1 VIEW: CPT

## 2023-03-14 PROCEDURE — 83880 ASSAY OF NATRIURETIC PEPTIDE: CPT

## 2023-03-14 RX ORDER — ALBUTEROL SULFATE 90 UG/1
2 AEROSOL, METERED RESPIRATORY (INHALATION) EVERY 4 HOURS PRN
Qty: 8 G | Refills: 0 | Status: SHIPPED | OUTPATIENT
Start: 2023-03-14

## 2023-03-14 RX ORDER — SODIUM CHLORIDE 0.9 % (FLUSH) 0.9 %
10 SYRINGE (ML) INJECTION AS NEEDED
Status: DISCONTINUED | OUTPATIENT
Start: 2023-03-14 | End: 2023-03-14 | Stop reason: HOSPADM

## 2023-03-14 RX ORDER — TRAMADOL HYDROCHLORIDE 50 MG/1
50 TABLET ORAL EVERY 8 HOURS PRN
Qty: 9 TABLET | Refills: 0 | Status: ON HOLD | OUTPATIENT
Start: 2023-03-14 | End: 2023-03-25

## 2023-03-14 NOTE — ED PROVIDER NOTES
"Time: 3:10 PM EDT  Date of encounter:  3/14/2023  Independent Historian/Clinical History and Information was obtained by:   Patient  Chief Complaint   Patient presents with   • Shortness of Breath       History is limited by: N/A    History of Present Illness:  Patient is a 63 y.o. year old male who presents to the emergency department for evaluation of shortness of breath.  Has been ongoing for couple months.  Feels like somebody told him he could have COPD.  States he has an inhaler that has not helped.  Having worsening difficulty with doing activities.  Is having a productive cough and mild sore throat.  Declines testing for COVID or flu. States has intermittent chest pain, none current. (Kavitha Vidal PA-C provider in triage 3:10 PM EDT )   Patient also requesting pain medication for his chronic low back pain.  No recent injury.  No neurologic deficits.  Patient states \"the doctors will not give me anything\".    HPI    Patient Care Team  Primary Care Provider: Kuldeep Winters MD    Past Medical History:     Allergies   Allergen Reactions   • Benadryl [Diphenhydramine] Palpitations     Past Medical History:   Diagnosis Date   • Acute bronchitis, unspecified    • Acute upper respiratory infection, unspecified    • Anxiety disorder, unspecified    • Anxiety with depression    • Chronic kidney disease, stage III (moderate) ([facility])    • Colon polyp    • Cough    • Dizziness and giddiness    • Emphysema, unspecified (HCC)    • Fatigue    • GERD (gastroesophageal reflux disease)    • Heartburn    • Hypertension    • Mild intermittent acute bronchitis with asthma with acute exacerbation    • Nonrheumatic aortic (valve) stenosis    • Vision problem      Past Surgical History:   Procedure Laterality Date   • CIRCUMCISION  2005   • COLONOSCOPY W/ BIOPSIES AND POLYPECTOMY     • ENDOSCOPY  02/2013   • HAND SURGERY Right     INJURY TO R HAND   • KNEE ARTHROSCOPY Left      Family History   Problem Relation Age of Onset " "  • Coronary artery disease Mother    • Pulmonary embolism Mother         POST OP   • Heart attack Brother    • Prostate cancer Brother    • COPD Brother    • Alcohol abuse Maternal Grandfather        Home Medications:  Prior to Admission medications    Medication Sig Start Date End Date Taking? Authorizing Provider   albuterol sulfate  (90 Base) MCG/ACT inhaler Inhale 2 puffs Every 4 (Four) Hours As Needed for Wheezing or Shortness of Air. 22   Katia Chavez APRN   amLODIPine (NORVASC) 10 MG tablet Take 1 tablet by mouth once daily 23   Kuldeep Winters MD   Budeson-Glycopyrrol-Formoterol (BREZTRI) 160-9-4.8 MCG/ACT aerosol inhaler Inhale 2 puffs 2 (Two) Times a Day. 10/10/22   Kuldeep Winters MD   doxazosin (CARDURA) 2 MG tablet TAKE 1 TABLET BY MOUTH ONCE DAILY AT NIGHT 10/10/22   Kuldeep Winters MD   losartan-hydrochlorothiazide (HYZAAR) 100-25 MG per tablet Take 1 tablet by mouth once daily 22   Kuldeep Winters MD   omeprazole (priLOSEC) 20 MG capsule TAKE 1 CAPSULE BY MOUTH ONCE DAILY AS NEEDED 10/10/22   Kuldeep Winters MD        Social History:   Social History     Tobacco Use   • Smoking status: Former     Packs/day: 0.25     Years: 30.00     Pack years: 7.50     Types: Cigarettes     Start date:      Quit date:      Years since quittin.2   • Smokeless tobacco: Former     Types: Chew   Vaping Use   • Vaping Use: Never used   Substance Use Topics   • Alcohol use: Not Currently     Comment: non-drinker   • Drug use: Never     Comment: none         Review of Systems:  Review of Systems   HENT: Positive for sore throat.    Respiratory: Positive for cough, chest tightness and shortness of breath.    Cardiovascular: Positive for chest pain. Negative for leg swelling.   Gastrointestinal: Negative for diarrhea, nausea and vomiting.        Physical Exam:  /68   Pulse 70   Temp 99.4 °F (37.4 °C) (Oral)   Resp 18   Ht 175.3 cm (69\")   Wt " 90.6 kg (199 lb 11.8 oz)   SpO2 100%   BMI 29.50 kg/m²     Physical Exam  Vitals and nursing note reviewed.   Constitutional:       General: He is awake.      Appearance: Normal appearance.   HENT:      Head: Normocephalic and atraumatic.      Nose: Nose normal.      Mouth/Throat:      Mouth: Mucous membranes are moist.   Eyes:      Extraocular Movements: Extraocular movements intact.      Conjunctiva/sclera: Conjunctivae normal.      Pupils: Pupils are equal, round, and reactive to light.   Cardiovascular:      Rate and Rhythm: Normal rate and regular rhythm.      Heart sounds: Normal heart sounds.   Pulmonary:      Effort: Pulmonary effort is normal. No respiratory distress.      Breath sounds: Normal breath sounds. No wheezing, rhonchi or rales.   Abdominal:      General: Bowel sounds are normal. There is no distension.      Palpations: Abdomen is soft.      Tenderness: There is no abdominal tenderness. There is no guarding or rebound.      Comments: No rigidity   Musculoskeletal:         General: No tenderness. Normal range of motion.      Cervical back: Normal range of motion and neck supple.      Right lower leg: No edema.      Left lower leg: No edema.   Skin:     General: Skin is warm and dry.      Coloration: Skin is not cyanotic or jaundiced.   Neurological:      General: No focal deficit present.      Mental Status: He is alert and oriented to person, place, and time. Mental status is at baseline.      Sensory: Sensation is intact.      Motor: Motor function is intact.      Coordination: Coordination is intact.   Psychiatric:         Attention and Perception: Attention and perception normal.         Mood and Affect: Mood and affect normal.         Speech: Speech normal.         Behavior: Behavior normal.         Judgment: Judgment normal.                  Procedures:  Procedures      Medical Decision Making:      Comorbidities that affect care:    Chronic Kidney Disease, GERD, bronchitis,  hypertension    External Notes reviewed:    None      The following orders were placed and all results were independently analyzed by me:  Orders Placed This Encounter   Procedures   • Influenza Antigen, Rapid - Swab, Nasopharynx   • COVID-19,APTIMA PANTHER(BRANDIN),[facility], NP/OP SWAB IN UTM/VTM/SALINE TRANSPORT MEDIA,24 HR TAT - Swab, Nasal Cavity   • Rapid Strep A Screen - Swab, Throat   • Beta Strep Culture, Throat - Swab, Throat   • XR Chest 1 View   • [place] Draw   • Comprehensive Metabolic Panel   • BNP   • Single High Sensitivity Troponin T   • CBC Auto Differential   • Single High Sensitivity Troponin T   • NPO Diet NPO Type: Strict NPO   • Undress & Gown   • Cardiac Monitoring   • Continuous Pulse Oximetry   • Vital Signs   • Oxygen Therapy- Nasal Cannula; 2 LPM; Titrate for SPO2: equal to or greater than, 92%   • ECG 12 Lead ED Triage Standing Order; SOA   • Insert Peripheral IV   • CBC & Differential   • Green Top (Gel)   • Lavender Top   • Gold Top - SST   • Light Blue Top       Medications Given in the Emergency Department:  Medications   sodium chloride 0.9 % flush 10 mL (has no administration in time range)        ED Course:    The patient was initially evaluated in the triage area where orders were placed. The patient was later dispositioned by Alexsander Patel MD.      The patient was advised to stay for completion of workup which includes but is not limited to communication of labs and radiological results, reassessment and plan. The patient was advised that leaving prior to disposition by a provider could result in critical findings that are not communicated to the patient.     ED Course as of 03/14/23 2047   Tue Mar 14, 2023   1727 EKG interpretation: Normal sinus rhythm, rate 73, normal NM and QT intervals, normal QRS duration, normal axis, normal ST segments with no acute ischemia. [RP]      ED Course User Index  [RP] Alexsander Patel MD       Labs:    Lab Results (last 24 hours)      Procedure Component Value Units Date/Time    CBC & Differential [[id number]]  (Abnormal) Collected: 03/14/23 1514    Specimen: Blood from Arm, Right Updated: 03/14/23 1602    Narrative:      The following orders were created for panel order CBC & Differential.  Procedure                               Abnormality         Status                     ---------                               -----------         ------                     CBC Auto Differential[[id number]]        Abnormal            Final result                 Please view results for these tests on the individual orders.    Comprehensive Metabolic Panel [[id number]]  (Abnormal) Collected: 03/14/23 1514    Specimen: Blood from Arm, Right Updated: 03/14/23 1620     Glucose 101 mg/dL      BUN 35 mg/dL      Creatinine 2.43 mg/dL      Sodium 140 mmol/L      Potassium 3.7 mmol/L      Chloride 101 mmol/L      CO2 28.6 mmol/L      Calcium 8.8 mg/dL      Total Protein 7.1 g/dL      Albumin 3.9 g/dL      ALT (SGPT) 15 U/L      AST (SGOT) 17 U/L      Alkaline Phosphatase 43 U/L      Total Bilirubin 0.7 mg/dL      Globulin 3.2 gm/dL      A/G Ratio 1.2 g/dL      BUN/Creatinine Ratio 14.4     Anion Gap 10.4 mmol/L      eGFR 29.0 mL/min/1.73     Narrative:      GFR Normal >60  Chronic Kidney Disease <60  Kidney Failure <15      BNP [[id number]]  (Normal) Collected: 03/14/23 1514    Specimen: Blood from Arm, Right Updated: 03/14/23 1618     proBNP 484.6 pg/mL     Narrative:      Among patients with dyspnea, NT-proBNP is highly sensitive for the detection of acute congestive heart failure. In addition NT-proBNP of <300 pg/ml effectively rules out acute congestive heart failure with 99% negative predictive value.      Single High Sensitivity Troponin T [[id number]]  (Abnormal) Collected: 03/14/23 1514    Specimen: Blood from Arm, Right Updated: 03/14/23 1620     HS Troponin T 31 ng/L     Narrative:      High Sensitive Troponin T Reference Range:  <10.0 ng/L- Negative Female for  AMI  <15.0 ng/L- Negative Male for AMI  >=10 - Abnormal Female indicating possible myocardial injury.  >=15 - Abnormal Male indicating possible myocardial injury.   Clinicians would have to utilize clinical acumen, EKG, Troponin, and serial changes to determine if it is an Acute Myocardial Infarction or myocardial injury due to an underlying chronic condition.         CBC Auto Differential [[id number]]  (Abnormal) Collected: 03/14/23 1514    Specimen: Blood from Arm, Right Updated: 03/14/23 1602     WBC 11.51 10*3/mm3      RBC 3.72 10*6/mm3      Hemoglobin 12.6 g/dL      Hematocrit 36.1 %      MCV 97.0 fL      MCH 33.9 pg      MCHC 34.9 g/dL      RDW 11.7 %      RDW-SD 41.8 fl      MPV 10.9 fL      Platelets 184 10*3/mm3      Neutrophil % 73.4 %      Lymphocyte % 12.6 %      Monocyte % 11.2 %      Eosinophil % 1.8 %      Basophil % 0.3 %      Immature Grans % 0.7 %      Neutrophils, Absolute 8.45 10*3/mm3      Lymphocytes, Absolute 1.45 10*3/mm3      Monocytes, Absolute 1.29 10*3/mm3      Eosinophils, Absolute 0.21 10*3/mm3      Basophils, Absolute 0.03 10*3/mm3      Immature Grans, Absolute 0.08 10*3/mm3      nRBC 0.0 /100 WBC     Single High Sensitivity Troponin T [[id number]]  (Abnormal) Collected: 03/14/23 1747    Specimen: Blood Updated: 03/14/23 2024     HS Troponin T 25 ng/L     Narrative:      High Sensitive Troponin T Reference Range:  <10.0 ng/L- Negative Female for AMI  <15.0 ng/L- Negative Male for AMI  >=10 - Abnormal Female indicating possible myocardial injury.  >=15 - Abnormal Male indicating possible myocardial injury.   Clinicians would have to utilize clinical acumen, EKG, Troponin, and serial changes to determine if it is an Acute Myocardial Infarction or myocardial injury due to an underlying chronic condition.         Influenza Antigen, Rapid - Swab, Nasopharynx [[id number]]  (Normal) Collected: 03/14/23 1800    Specimen: Swab from Nasopharynx Updated: 03/14/23 1857     Influenza A Ag, EIA  Negative     Influenza B Ag, EIA Negative    COVID-19,APTIMA PANTHER(BRANDIN),[facility], NP/OP SWAB IN UTM/VTM/SALINE TRANSPORT MEDIA,24 HR TAT - Swab, Nasal Cavity [[id number]] Collected: 03/14/23 1800    Specimen: Swab from Nasal Cavity Updated: 03/14/23 1821    Rapid Strep A Screen - Swab, Throat [[id number]]  (Normal) Collected: 03/14/23 1800    Specimen: Swab from Throat Updated: 03/14/23 1852     Strep A Ag Negative    Beta Strep Culture, Throat - Swab, Throat [[id number]] Collected: 03/14/23 1800    Specimen: Swab from Throat Updated: 03/14/23 1852           Imaging:    XR Chest 1 View    Result Date: 3/14/2023  PROCEDURE: XR CHEST 1 VW  COMPARISON: HASSANKIRSTIE MAYER, CHEST PA/AP & LAT 2V, 1/04/2013, 14:11.  INDICATIONS: SOA Triage Protocol  FINDINGS:  The lungs are clear bilaterally.  The cardiac and mediastinal silhouettes appear normal.  No effusion is seen.        1. No acute cardiopulmonary disease       Dagoberto Desouza M.D.       Electronically Signed and Approved By: Dagoberto Desouza M.D. on 3/14/2023 at 15:47                 Differential Diagnosis and Discussion:      Dyspnea: Differential diagnosis includes but is not limited to metabolic acidosis, neurological disorders, psychogenic, asthma, pneumothorax, upper airway obstruction, COPD, pneumonia, noncardiogenic pulmonary edema, interstitial lung disease, anemia, congestive heart failure, and pulmonary embolism    All labs were reviewed and interpreted by me.  All X-rays were independently reviewed by me.  EKG was interpreted by me.    MDM         Patient Care Considerations:    I considered prescribing nonsteroidal anti-inflammatories for his chronic back pain, however he has chronic kidney disease.    Consultants/Shared Management Plan:    None    Social Determinants of Health:    Patient is independent, reliable, and has access to care.       Disposition and Care Coordination:    Discharged: The patient is suitable and stable for discharge  with no need for consideration of observation or admission.    I have explained the patient´s condition, diagnoses and treatment plan based on the information available to me at this time. I have answered questions and addressed any concerns. The patient has a good  understanding of the patient´s diagnosis, condition, and treatment plan as can be expected at this point. The vital signs have been stable. The patient´s condition is stable and appropriate for discharge from the emergency department.      The patient will pursue further outpatient evaluation with the primary care physician or other designated or consulting physician as outlined in the discharge instructions. They are agreeable to this plan of care and follow-up instructions have been explained in detail. The patient has received these instructions in written format and have expressed an understanding of the discharge instructions. The patient is aware that any significant change in condition or worsening of symptoms should prompt an immediate return to this or the closest emergency department or call to 911.    Final diagnoses:   Acute bronchitis, unspecified organism   Chronic right-sided low back pain without sciatica        ED Disposition     ED Disposition   Discharge    Condition   Stable    Comment   --             This medical record created using voice recognition software.           Alexsander Patel MD  03/14/23 6004

## 2023-03-15 ENCOUNTER — OFFICE VISIT (OUTPATIENT)
Dept: FAMILY MEDICINE CLINIC | Age: 63
End: 2023-03-15
Payer: MEDICARE

## 2023-03-15 VITALS
DIASTOLIC BLOOD PRESSURE: 54 MMHG | TEMPERATURE: 98.8 F | HEART RATE: 70 BPM | HEIGHT: 69 IN | BODY MASS INDEX: 29.62 KG/M2 | SYSTOLIC BLOOD PRESSURE: 107 MMHG | WEIGHT: 200 LBS | OXYGEN SATURATION: 96 %

## 2023-03-15 DIAGNOSIS — N18.31 STAGE 3A CHRONIC KIDNEY DISEASE: ICD-10-CM

## 2023-03-15 DIAGNOSIS — J43.9 PULMONARY EMPHYSEMA, UNSPECIFIED EMPHYSEMA TYPE: ICD-10-CM

## 2023-03-15 DIAGNOSIS — R77.8 ELEVATED TROPONIN LEVEL: ICD-10-CM

## 2023-03-15 DIAGNOSIS — G89.29 CHRONIC BILATERAL LOW BACK PAIN, UNSPECIFIED WHETHER SCIATICA PRESENT: ICD-10-CM

## 2023-03-15 DIAGNOSIS — R07.9 INTERMITTENT CHEST PAIN: ICD-10-CM

## 2023-03-15 DIAGNOSIS — M54.50 CHRONIC BILATERAL LOW BACK PAIN, UNSPECIFIED WHETHER SCIATICA PRESENT: ICD-10-CM

## 2023-03-15 DIAGNOSIS — R05.1 ACUTE COUGH: Primary | ICD-10-CM

## 2023-03-15 DIAGNOSIS — D72.829 LEUKOCYTOSIS, UNSPECIFIED TYPE: ICD-10-CM

## 2023-03-15 DIAGNOSIS — I35.0 NONRHEUMATIC AORTIC VALVE STENOSIS: ICD-10-CM

## 2023-03-15 PROBLEM — K21.9 GASTROESOPHAGEAL REFLUX DISEASE: Status: ACTIVE | Noted: 2022-10-31

## 2023-03-15 LAB
QT INTERVAL: 367 MS
SARS-COV-2 RNA RESP QL NAA+PROBE: NOT DETECTED

## 2023-03-15 PROCEDURE — 93000 ELECTROCARDIOGRAM COMPLETE: CPT | Performed by: FAMILY MEDICINE

## 2023-03-15 PROCEDURE — 99214 OFFICE O/P EST MOD 30 MIN: CPT

## 2023-03-15 PROCEDURE — 3078F DIAST BP <80 MM HG: CPT

## 2023-03-15 PROCEDURE — 3074F SYST BP LT 130 MM HG: CPT

## 2023-03-15 PROCEDURE — 1159F MED LIST DOCD IN RCRD: CPT

## 2023-03-15 NOTE — PROGRESS NOTES
Subjective     CHIEF COMPLAINT    Chief Complaint   Patient presents with   • ER follow Up Visit     Bronchitis      History of Present Illness  Patient is a 63-year-old male who presents to the clinic today regarding bronchitis.  States that he went to the ER yesterday and was diagnosed with bronchitis.  He is concerned he may need some steroids.  He was not prescribed any medications other than tramadol for low back pain yesterday at the ER.  Reports that he had a fairly extensive work-up done at the ER.  Today, patient is complaining of cough.  This has been present for several months.  He states it has not recently worsened.  He denies any shortness of breath or wheezing.  Reports that overall he is feeling better than he did initially with his cough.  He follows with pulmonology (Dr. Le) for emphysema.  He is currently on albuterol as needed as well as Breztri.     Patient also states he is concerned regarding taking tramadol due to his chronic kidney disease.  He states that his nephrologist () has told him not to take any medications other than Tylenol.  He had no injury to his back.  Denies any neurologic deficits.  Back pain is chronic.  The back pain is not of concern to him today.    Patient states that he also was worked up regarding intermittent chest discomfort at the ER. Denies any active chest pain. Episodes are very intermittent and have no associated symptoms. Patient has a history of aortic valve insufficiency. Is not currently following with cardiology.     Review of Systems   Constitutional: Negative for chills and fever.   HENT: Negative for congestion.    Respiratory: Positive for cough. Negative for shortness of breath and wheezing.    Cardiovascular: Negative for chest pain and palpitations.   Gastrointestinal: Negative for nausea and vomiting.   Musculoskeletal: Positive for back pain (chronic, not concerning to patient today).   Neurological: Negative for dizziness, weakness,  "light-headedness and headaches.       Allergies   Allergen Reactions   • Benadryl [Diphenhydramine] Palpitations       Current Outpatient Medications on File Prior to Visit   Medication Sig Dispense Refill   • albuterol sulfate  (90 Base) MCG/ACT inhaler Inhale 2 puffs Every 4 (Four) Hours As Needed for Wheezing or Shortness of Air. 6.7 g 5   • amLODIPine (NORVASC) 10 MG tablet Take 1 tablet by mouth once daily 90 tablet 0   • Budeson-Glycopyrrol-Formoterol (BREZTRI) 160-9-4.8 MCG/ACT aerosol inhaler Inhale 2 puffs 2 (Two) Times a Day. 10.7 g 0   • doxazosin (CARDURA) 2 MG tablet TAKE 1 TABLET BY MOUTH ONCE DAILY AT NIGHT 90 tablet 1   • losartan-hydrochlorothiazide (HYZAAR) 100-25 MG per tablet Take 1 tablet by mouth once daily 90 tablet 1   • omeprazole (priLOSEC) 20 MG capsule TAKE 1 CAPSULE BY MOUTH ONCE DAILY AS NEEDED 90 capsule 1   • traMADol (ULTRAM) 50 MG tablet Take 1 tablet by mouth Every 8 (Eight) Hours As Needed for Moderate Pain. 9 tablet 0   • albuterol sulfate  (90 Base) MCG/ACT inhaler Inhale 2 puffs Every 4 (Four) Hours As Needed for Wheezing. 8 g 0     No current facility-administered medications on file prior to visit.     /54 (BP Location: Right arm, Patient Position: Sitting, Cuff Size: Large Adult)   Pulse 70   Temp 98.8 °F (37.1 °C) (Oral)   Ht 175.3 cm (69.02\")   Wt 90.7 kg (200 lb)   SpO2 96%   BMI 29.52 kg/m²     Objective     Physical Exam  Vitals and nursing note reviewed.   Constitutional:       General: He is not in acute distress.     Appearance: Normal appearance. He is not ill-appearing.   HENT:      Head: Normocephalic and atraumatic.      Right Ear: Tympanic membrane, ear canal and external ear normal.      Left Ear: Tympanic membrane, ear canal and external ear normal.      Nose: Nose normal.      Right Sinus: No maxillary sinus tenderness or frontal sinus tenderness.      Left Sinus: No maxillary sinus tenderness or frontal sinus tenderness.      " Mouth/Throat:      Lips: Pink.      Mouth: Mucous membranes are moist.      Pharynx: Oropharynx is clear. Uvula midline. No pharyngeal swelling, oropharyngeal exudate, posterior oropharyngeal erythema or uvula swelling.   Eyes:      Extraocular Movements: Extraocular movements intact.      Pupils: Pupils are equal, round, and reactive to light.   Cardiovascular:      Rate and Rhythm: Normal rate and regular rhythm.      Heart sounds: Murmur heard.   Pulmonary:      Effort: Pulmonary effort is normal. No accessory muscle usage or respiratory distress.      Breath sounds: Normal breath sounds. No wheezing or rhonchi.   Musculoskeletal:      Cervical back: Normal range of motion. Normal range of motion.   Lymphadenopathy:      Cervical: No cervical adenopathy.   Skin:     General: Skin is warm and dry.   Neurological:      General: No focal deficit present.      Mental Status: He is alert and oriented to person, place, and time.      Sensory: Sensation is intact.      Motor: No weakness.      Gait: Gait is intact.   Psychiatric:         Mood and Affect: Mood and affect normal.         Behavior: Behavior normal.         ECG 12 Lead    Date/Time: 3/16/2023 5:25 PM  Performed by: Cara Osborne MD  Authorized by: Dione Lopes APRN   Comparison: compared with previous ECG from 3/14/2023  Similar to previous ECG  Rhythm: sinus rhythm  Q waves: aVR    ST Elevation: V2, V3, V4 and V5  T flattening: V1  Other findings: non-specific ST-T wave changes    Clinical impression: abnormal EKG  Comments: Nonspecific ST elevation in leads V2 to V5 less than 1 box with Q waves noted in aVR, recommend referral back to cardiology to reeval his heart given his underlying aortic insufficiency and intermittent chest pain.  He did not have chest pain per NP on exam today.  Patient advised to go back to the ER if he has reoccurring chest pain.  Dr. Osborne                  Diagnoses and all orders for this visit:    1. Acute cough  (Primary)    2. Pulmonary emphysema, unspecified emphysema type (HCC)  Comments:  Continue medications as prescribed, follow-up with pulmonology.    3. Intermittent chest pain  -     ECG 12 Lead  -     Ambulatory Referral to Cardiology    4. Elevated troponin level  -     ECG 12 Lead  -     Ambulatory Referral to Cardiology  -     High Sensitivity Troponin T; Future    5. Nonrheumatic aortic valve stenosis  -     Ambulatory Referral to Cardiology    6. Chronic bilateral low back pain, unspecified whether sciatica present  Comments:  Not of concern to patient today. He stated he is not going to take the Tramadol prescribed per ER.     7. Stage 3a chronic kidney disease (HCC)  -     Basic metabolic panel; Future    8. Leukocytosis, unspecified type  -     CBC w AUTO Differential; Future    No concerning findings on exam today. Patient was negative for COVID, flu and strep yesterday at the ER.  His chest x-ray showed no acute cardiopulmonary process.  Today his lungs sound clear and he reports that overall his symptoms are improving.  I do not feel that there is any additional treatment warranted regarding this at this time.  He was encouraged to follow-up routinely with his pulmonologist and continue his medications as prescribed.    Upon reviewing his ER visit, noted that patient's troponins were elevated at the ER.  His first troponin was 31 and his second troponin was 25.  His EKG showed normal sinus rhythm with no acute ischemia at the ER.  I reviewed this case, his labs and his ER visit with Dr. Osborne, on-call provider.  She recommended to repeat his EKG today. There were no acute findings on his EKG today.  He is not having any active chest pain.  Elevated troponin levels may be due to kidney disease. We will send in a referral to cardiology given these findings as well as his history of aortic valve stenosis.    We will set patient up with a follow-up with his PCP in 2 days for further management of his chronic  conditions. Patient was educated extensively that for any worsening symptoms or any chest pain or shortness of breath, he needs to proceed to the ER. Patient voiced understanding of all instructions and had no further questions at this time.     Addendum: further discussed case with Dr. Osborne, she recommended a repeat troponin level, BMP and CBC. Orders placed.     Follow up:  Return in about 2 days (around 3/17/2023), or if symptoms worsen or fail to improve.  Patient was given instructions and counseling regarding his condition or for health maintenance advice. Please see specific information pulled into the AVS if appropriate.

## 2023-03-16 ENCOUNTER — TELEPHONE (OUTPATIENT)
Dept: FAMILY MEDICINE CLINIC | Age: 63
End: 2023-03-16
Payer: MEDICARE

## 2023-03-16 ENCOUNTER — LAB (OUTPATIENT)
Dept: LAB | Facility: HOSPITAL | Age: 63
End: 2023-03-16
Payer: MEDICARE

## 2023-03-16 DIAGNOSIS — D72.829 LEUKOCYTOSIS, UNSPECIFIED TYPE: ICD-10-CM

## 2023-03-16 DIAGNOSIS — N18.31 STAGE 3A CHRONIC KIDNEY DISEASE: ICD-10-CM

## 2023-03-16 DIAGNOSIS — R77.8 ELEVATED TROPONIN LEVEL: ICD-10-CM

## 2023-03-16 LAB
ANION GAP SERPL CALCULATED.3IONS-SCNC: 13.3 MMOL/L (ref 5–15)
BACTERIA SPEC AEROBE CULT: NORMAL
BASOPHILS # BLD AUTO: 0.03 10*3/MM3 (ref 0–0.2)
BASOPHILS NFR BLD AUTO: 0.2 % (ref 0–1.5)
BUN SERPL-MCNC: 38 MG/DL (ref 8–23)
BUN/CREAT SERPL: 12.6 (ref 7–25)
CALCIUM SPEC-SCNC: 9 MG/DL (ref 8.6–10.5)
CHLORIDE SERPL-SCNC: 102 MMOL/L (ref 98–107)
CO2 SERPL-SCNC: 26.7 MMOL/L (ref 22–29)
CREAT SERPL-MCNC: 3.01 MG/DL (ref 0.76–1.27)
DEPRECATED RDW RBC AUTO: 41.5 FL (ref 37–54)
EGFRCR SERPLBLD CKD-EPI 2021: 22.4 ML/MIN/1.73
EOSINOPHIL # BLD AUTO: 0.2 10*3/MM3 (ref 0–0.4)
EOSINOPHIL NFR BLD AUTO: 1.5 % (ref 0.3–6.2)
ERYTHROCYTE [DISTWIDTH] IN BLOOD BY AUTOMATED COUNT: 11.4 % (ref 12.3–15.4)
GLUCOSE SERPL-MCNC: 85 MG/DL (ref 65–99)
HCT VFR BLD AUTO: 35.4 % (ref 37.5–51)
HGB BLD-MCNC: 12.1 G/DL (ref 13–17.7)
IMM GRANULOCYTES # BLD AUTO: 0.05 10*3/MM3 (ref 0–0.05)
IMM GRANULOCYTES NFR BLD AUTO: 0.4 % (ref 0–0.5)
LYMPHOCYTES # BLD AUTO: 1.76 10*3/MM3 (ref 0.7–3.1)
LYMPHOCYTES NFR BLD AUTO: 13.5 % (ref 19.6–45.3)
MCH RBC QN AUTO: 33.5 PG (ref 26.6–33)
MCHC RBC AUTO-ENTMCNC: 34.2 G/DL (ref 31.5–35.7)
MCV RBC AUTO: 98.1 FL (ref 79–97)
MONOCYTES # BLD AUTO: 1.51 10*3/MM3 (ref 0.1–0.9)
MONOCYTES NFR BLD AUTO: 11.6 % (ref 5–12)
NEUTROPHILS NFR BLD AUTO: 72.8 % (ref 42.7–76)
NEUTROPHILS NFR BLD AUTO: 9.47 10*3/MM3 (ref 1.7–7)
PLATELET # BLD AUTO: 207 10*3/MM3 (ref 140–450)
PMV BLD AUTO: 10.3 FL (ref 6–12)
POTASSIUM SERPL-SCNC: 4 MMOL/L (ref 3.5–5.2)
RBC # BLD AUTO: 3.61 10*6/MM3 (ref 4.14–5.8)
SODIUM SERPL-SCNC: 142 MMOL/L (ref 136–145)
TROPONIN T SERPL HS-MCNC: 30 NG/L
WBC NRBC COR # BLD: 13.02 10*3/MM3 (ref 3.4–10.8)

## 2023-03-16 PROCEDURE — 80048 BASIC METABOLIC PNL TOTAL CA: CPT

## 2023-03-16 PROCEDURE — 36415 COLL VENOUS BLD VENIPUNCTURE: CPT

## 2023-03-16 PROCEDURE — 84484 ASSAY OF TROPONIN QUANT: CPT

## 2023-03-16 PROCEDURE — 85025 COMPLETE CBC W/AUTO DIFF WBC: CPT

## 2023-03-16 NOTE — TELEPHONE ENCOUNTER
Please call patient and let him know that I discussed his ER visit further with Dr. Osborne and we would recommend that he have some blood work repeated.  I have placed the orders for this so please have him come by today to get these labs completed.  He should keep his follow-up appointment with Dr. Winters tomorrow to review his labs and follow-up regarding his chronic conditions.

## 2023-03-17 ENCOUNTER — LAB (OUTPATIENT)
Dept: LAB | Facility: HOSPITAL | Age: 63
End: 2023-03-17
Payer: MEDICARE

## 2023-03-17 ENCOUNTER — OFFICE VISIT (OUTPATIENT)
Dept: FAMILY MEDICINE CLINIC | Age: 63
End: 2023-03-17
Payer: MEDICARE

## 2023-03-17 ENCOUNTER — HOSPITAL ENCOUNTER (OUTPATIENT)
Dept: GENERAL RADIOLOGY | Facility: HOSPITAL | Age: 63
Discharge: HOME | End: 2023-03-17
Payer: MEDICARE

## 2023-03-17 VITALS
BODY MASS INDEX: 29.44 KG/M2 | WEIGHT: 198.8 LBS | TEMPERATURE: 100.7 F | HEIGHT: 69 IN | HEART RATE: 83 BPM | DIASTOLIC BLOOD PRESSURE: 64 MMHG | OXYGEN SATURATION: 96 % | SYSTOLIC BLOOD PRESSURE: 100 MMHG

## 2023-03-17 DIAGNOSIS — R05.2 SUBACUTE COUGH: Primary | ICD-10-CM

## 2023-03-17 DIAGNOSIS — R77.8 ELEVATED TROPONIN LEVEL: ICD-10-CM

## 2023-03-17 DIAGNOSIS — I35.0 AORTIC VALVE STENOSIS, ETIOLOGY OF CARDIAC VALVE DISEASE UNSPECIFIED: ICD-10-CM

## 2023-03-17 DIAGNOSIS — N17.9 ACUTE KIDNEY INJURY: ICD-10-CM

## 2023-03-17 DIAGNOSIS — D72.829 LEUKOCYTOSIS, UNSPECIFIED TYPE: ICD-10-CM

## 2023-03-17 DIAGNOSIS — J43.9 PULMONARY EMPHYSEMA, UNSPECIFIED EMPHYSEMA TYPE: ICD-10-CM

## 2023-03-17 PROBLEM — R82.90 ABNORMAL URINE FINDINGS: Status: ACTIVE | Noted: 2023-03-17

## 2023-03-17 LAB
ALBUMIN SERPL-MCNC: 3.9 G/DL (ref 3.5–5.2)
ALBUMIN/GLOB SERPL: 1 G/DL
ALP SERPL-CCNC: 38 U/L (ref 39–117)
ALT SERPL W P-5'-P-CCNC: 26 U/L (ref 1–41)
ANION GAP SERPL CALCULATED.3IONS-SCNC: 13.4 MMOL/L (ref 5–15)
AST SERPL-CCNC: 25 U/L (ref 1–40)
BACTERIA UR QL AUTO: ABNORMAL /HPF
BASOPHILS # BLD AUTO: 0.04 10*3/MM3 (ref 0–0.2)
BASOPHILS NFR BLD AUTO: 0.3 % (ref 0–1.5)
BILIRUB SERPL-MCNC: 0.7 MG/DL (ref 0–1.2)
BILIRUB UR QL STRIP: NEGATIVE
BUN SERPL-MCNC: 42 MG/DL (ref 8–23)
BUN/CREAT SERPL: 12.5 (ref 7–25)
CALCIUM SPEC-SCNC: 8.9 MG/DL (ref 8.6–10.5)
CHLORIDE SERPL-SCNC: 103 MMOL/L (ref 98–107)
CLARITY UR: ABNORMAL
CO2 SERPL-SCNC: 24.6 MMOL/L (ref 22–29)
COLOR UR: ABNORMAL
CREAT SERPL-MCNC: 3.37 MG/DL (ref 0.76–1.27)
DEPRECATED RDW RBC AUTO: 41.5 FL (ref 37–54)
EGFRCR SERPLBLD CKD-EPI 2021: 19.6 ML/MIN/1.73
EOSINOPHIL # BLD AUTO: 0.18 10*3/MM3 (ref 0–0.4)
EOSINOPHIL NFR BLD AUTO: 1.2 % (ref 0.3–6.2)
ERYTHROCYTE [DISTWIDTH] IN BLOOD BY AUTOMATED COUNT: 11.4 % (ref 12.3–15.4)
GLOBULIN UR ELPH-MCNC: 4 GM/DL
GLUCOSE SERPL-MCNC: 106 MG/DL (ref 65–99)
GLUCOSE UR STRIP-MCNC: NEGATIVE MG/DL
HCT VFR BLD AUTO: 35.3 % (ref 37.5–51)
HGB BLD-MCNC: 12.3 G/DL (ref 13–17.7)
HGB UR QL STRIP.AUTO: ABNORMAL
IMM GRANULOCYTES # BLD AUTO: 0.04 10*3/MM3 (ref 0–0.05)
IMM GRANULOCYTES NFR BLD AUTO: 0.3 % (ref 0–0.5)
KETONES UR QL STRIP: NEGATIVE
LEUKOCYTE ESTERASE UR QL STRIP.AUTO: NEGATIVE
LYMPHOCYTES # BLD AUTO: 1.64 10*3/MM3 (ref 0.7–3.1)
LYMPHOCYTES NFR BLD AUTO: 11.2 % (ref 19.6–45.3)
MCH RBC QN AUTO: 33.9 PG (ref 26.6–33)
MCHC RBC AUTO-ENTMCNC: 34.8 G/DL (ref 31.5–35.7)
MCV RBC AUTO: 97.2 FL (ref 79–97)
MONOCYTES # BLD AUTO: 1.24 10*3/MM3 (ref 0.1–0.9)
MONOCYTES NFR BLD AUTO: 8.5 % (ref 5–12)
NEUTROPHILS NFR BLD AUTO: 11.49 10*3/MM3 (ref 1.7–7)
NEUTROPHILS NFR BLD AUTO: 78.5 % (ref 42.7–76)
NITRITE UR QL STRIP: NEGATIVE
PH UR STRIP.AUTO: 5.5 [PH] (ref 5–8)
PLATELET # BLD AUTO: 219 10*3/MM3 (ref 140–450)
PMV BLD AUTO: 10.5 FL (ref 6–12)
POTASSIUM SERPL-SCNC: 4.4 MMOL/L (ref 3.5–5.2)
PROT SERPL-MCNC: 7.9 G/DL (ref 6–8.5)
PROT UR QL STRIP: ABNORMAL
RBC # BLD AUTO: 3.63 10*6/MM3 (ref 4.14–5.8)
RBC # UR STRIP: ABNORMAL /HPF
RBC CASTS #/AREA URNS LPF: ABNORMAL /LPF
REF LAB TEST METHOD: ABNORMAL
SODIUM SERPL-SCNC: 141 MMOL/L (ref 136–145)
SP GR UR STRIP: 1.02 (ref 1–1.03)
SQUAMOUS #/AREA URNS HPF: ABNORMAL /HPF
UROBILINOGEN UR QL STRIP: ABNORMAL
WBC # UR STRIP: ABNORMAL /HPF
WBC CASTS #/AREA URNS LPF: ABNORMAL /LPF
WBC NRBC COR # BLD: 14.63 10*3/MM3 (ref 3.4–10.8)

## 2023-03-17 PROCEDURE — 71046 X-RAY EXAM CHEST 2 VIEWS: CPT

## 2023-03-17 PROCEDURE — 80053 COMPREHEN METABOLIC PANEL: CPT | Performed by: FAMILY MEDICINE

## 2023-03-17 PROCEDURE — 99214 OFFICE O/P EST MOD 30 MIN: CPT | Performed by: FAMILY MEDICINE

## 2023-03-17 PROCEDURE — 3074F SYST BP LT 130 MM HG: CPT | Performed by: FAMILY MEDICINE

## 2023-03-17 PROCEDURE — 36415 COLL VENOUS BLD VENIPUNCTURE: CPT | Performed by: FAMILY MEDICINE

## 2023-03-17 PROCEDURE — 87086 URINE CULTURE/COLONY COUNT: CPT | Performed by: FAMILY MEDICINE

## 2023-03-17 PROCEDURE — C9803 HOPD COVID-19 SPEC COLLECT: HCPCS

## 2023-03-17 PROCEDURE — U0004 COV-19 TEST NON-CDC HGH THRU: HCPCS | Performed by: FAMILY MEDICINE

## 2023-03-17 PROCEDURE — 3078F DIAST BP <80 MM HG: CPT | Performed by: FAMILY MEDICINE

## 2023-03-17 PROCEDURE — 81001 URINALYSIS AUTO W/SCOPE: CPT | Performed by: FAMILY MEDICINE

## 2023-03-17 PROCEDURE — 85025 COMPLETE CBC W/AUTO DIFF WBC: CPT | Performed by: FAMILY MEDICINE

## 2023-03-17 RX ORDER — CEFUROXIME AXETIL 500 MG/1
500 TABLET ORAL DAILY
Qty: 7 TABLET | Refills: 0 | Status: SHIPPED | OUTPATIENT
Start: 2023-03-17 | End: 2023-03-28 | Stop reason: HOSPADM

## 2023-03-17 NOTE — PROGRESS NOTES
"Chief Complaint  Follow-up (Per Dione, ekg results. ) and Cough (Does not want to be test for covid/flu. )    Subjective          Chad Oneill presents to [facility]  History of Present Illness  Mr. Oneill has not been feeling well lately.    He was seen at the emergency room at [facility] on March 14 for some chest discomfort and shortness of breath.  Was diagnosed with bronchitis.  At the ER visit blood test did show an elevation in troponin which remained stable possibly reflective of his renal disease.    Had a follow-up visit in our office on March 15 with a fairly unremarkable exam.  Repeat labs continue to show elevation in white blood cell count, worsening renal function, and continued but stable elevation in troponin.    Today he reports that he has had a \"lots of cough\" with some gray sputum.  Also having some associated back/flank discomfort.  No prior history of kidney stone.  Has not noted any blood in his urine.  I do see that he has had a UA in the past that had some RBCs.  With the elevated white blood cell count he states he has not had any noticeable fever, chills, but I do note that he has had some weight loss.    He has not been aware of any fever at home.  He is noted to have a little bit of a temp here today.        Current Outpatient Medications   Medication Sig Dispense Refill   • albuterol sulfate  (90 Base) MCG/ACT inhaler Inhale 2 puffs Every 4 (Four) Hours As Needed for Wheezing or Shortness of Air. 6.7 g 5   • albuterol sulfate  (90 Base) MCG/ACT inhaler Inhale 2 puffs Every 4 (Four) Hours As Needed for Wheezing. 8 g 0   • amLODIPine (NORVASC) 10 MG tablet Take 1 tablet by mouth once daily 90 tablet 0   • Budeson-Glycopyrrol-Formoterol (BREZTRI) 160-9-4.8 MCG/ACT aerosol inhaler Inhale 2 puffs 2 (Two) Times a Day. 10.7 g 0   • doxazosin (CARDURA) 2 MG tablet TAKE 1 TABLET BY MOUTH ONCE DAILY AT NIGHT 90 tablet 1   • " "losartan-hydrochlorothiazide (HYZAAR) 100-25 MG per tablet Take 1 tablet by mouth once daily 90 tablet 1   • omeprazole (priLOSEC) 20 MG capsule TAKE 1 CAPSULE BY MOUTH ONCE DAILY AS NEEDED 90 capsule 1   • traMADol (ULTRAM) 50 MG tablet Take 1 tablet by mouth Every 8 (Eight) Hours As Needed for Moderate Pain. 9 tablet 0   • cefuroxime (CEFTIN) 500 MG tablet Take 1 tablet by mouth Daily. 7 tablet 0     No current facility-administered medications for this visit.       Review of Systems         Objective   Vital Signs:   /64 (BP Location: Right arm, Patient Position: Sitting)   Pulse 83   Temp (!) 100.7 °F (38.2 °C) (Oral)   Ht 175.3 cm (69.02\")   Wt 90.2 kg (198 lb 12.8 oz)   SpO2 96%   BMI 29.34 kg/m²     Physical Exam   No acute distress  Does not appear toxic  His color however is not good he is a little bit grayish tone.  Eyes are nonicteric  Tympanic membranes are clear  The oropharynx is clear  No cervical or supraclavicular adenopathy  The thyroid is not enlarged, nontender, no mass  The heart is of regular rhythm he does have a 2/6 to 3/6 systolic murmur  Lungs are bilaterally clear with good airflow  Abdomen bowel sounds positive, soft, nontender, no appreciable organomegaly, no mass  No CVA tenderness  Lower extremities are without edema  Neurologic without gross lateralizing focal deficits        Result Review :                     Assessment and Plan    Diagnoses and all orders for this visit:    1. Subacute cough (Primary)  Assessment & Plan:  His clinical picture is 1 that is a little bit hard to put together.  Perhaps he had a bout of COVID with a false negative test resulted ER.  This could explain his cough, drop in renal function, and leukocytosis.    Orders:  -     XR Chest PA & Lateral  -     COVID-19,APTIMA PANTHER(BRANDIN),[facility]/ [facility], NP/OP SWAB IN UTM/VTM/SALINE TRANSPORT MEDIA,24 HR TAT - Swab, Nasopharynx    2. Leukocytosis, unspecified type  Assessment & Plan:  We will get some " follow-up testing including a PCR COVID test and repeat CBC.    Orders:  -     Urinalysis With Culture If Indicated - Urine, Clean Catch  -     CBC & Differential  -     XR Chest PA & Lateral  -     COVID-19,APTIMA PANTHER(BRANDIN),[facility], NP/OP SWAB IN UTM/VTM/SALINE TRANSPORT MEDIA,24 HR TAT - Swab, Nasopharynx  -     Urinalysis, Microscopic Only - Urine, Clean Catch  -     Urine Culture - Urine, Urine, Clean Catch  -     US Renal Bilateral; Future  -     cefuroxime (CEFTIN) 500 MG tablet; Take 1 tablet by mouth Daily.  Dispense: 7 tablet; Refill: 0    3. Acute kidney injury (HCC)  Assessment & Plan:  Most concerning of his findings is the drop off on his renal function.  For that reason I am going to switch him from Hyzaar to Cozaar.  We will also have him to come off of the omeprazole.  Hold off on use of tramadol.  I will order ultrasound of the kidneys looking for evidence of obstruction or renal disease.  Repeat the UA looking see if he still has blood present.        Orders:  -     Urinalysis With Culture If Indicated - Urine, Clean Catch  -     Comprehensive Metabolic Panel  -     Urinalysis, Microscopic Only - Urine, Clean Catch  -     Urine Culture - Urine, Urine, Clean Catch  -     US Renal Bilateral; Future  -     cefuroxime (CEFTIN) 500 MG tablet; Take 1 tablet by mouth Daily.  Dispense: 7 tablet; Refill: 0  -     Ambulatory Referral to Nephrology    4. Pulmonary emphysema, unspecified emphysema type (HCC)  Assessment & Plan:  His pulmonary status however seems to be pretty much at baseline.    In fact he is not even having issues with wheezing today.  We will go ahead and repeat a chest x-ray to make sure he has not developed an infiltrate over the past couple of days.  Might end up needing a CT scan of the chest      5. Aortic valve stenosis, etiology of cardiac valve disease unspecified      Follow Up   No follow-ups on file.  Patient was given instructions and counseling regarding his condition  or for health maintenance advice. Please see specific information pulled into the AVS if appropriate.

## 2023-03-17 NOTE — ASSESSMENT & PLAN NOTE
Most concerning of his findings is the drop off on his renal function.  For that reason I am going to switch him from zaar to [facility].  We will also have him to come off of the omeprazole.  Hold off on use of tramadol.  I will order ultrasound of the kidneys looking for evidence of obstruction or renal disease.  Repeat the UA looking see if he still has blood present.

## 2023-03-18 ENCOUNTER — HOSPITAL ENCOUNTER (EMERGENCY)
Facility: HOSPITAL | Age: 63
Discharge: HOME | End: 2023-03-18
Attending: EMERGENCY MEDICINE | Admitting: EMERGENCY MEDICINE
Payer: MEDICARE

## 2023-03-18 ENCOUNTER — APPOINTMENT (OUTPATIENT)
Dept: GENERAL RADIOLOGY | Facility: HOSPITAL | Age: 63
End: 2023-03-18
Payer: MEDICARE

## 2023-03-18 ENCOUNTER — APPOINTMENT (OUTPATIENT)
Dept: CT IMAGING | Facility: HOSPITAL | Age: 63
End: 2023-03-18
Payer: MEDICARE

## 2023-03-18 VITALS
DIASTOLIC BLOOD PRESSURE: 69 MMHG | WEIGHT: 199.96 LBS | OXYGEN SATURATION: 95 % | HEIGHT: 69 IN | RESPIRATION RATE: 20 BRPM | BODY MASS INDEX: 29.62 KG/M2 | HEART RATE: 78 BPM | SYSTOLIC BLOOD PRESSURE: 122 MMHG | TEMPERATURE: 97.8 F

## 2023-03-18 DIAGNOSIS — N39.0 ACUTE URINARY TRACT INFECTION: ICD-10-CM

## 2023-03-18 DIAGNOSIS — N17.9 ACUTE KIDNEY INJURY: Primary | ICD-10-CM

## 2023-03-18 LAB
ALBUMIN SERPL-MCNC: 3.7 G/DL (ref 3.5–5.2)
ALBUMIN/GLOB SERPL: 0.9 G/DL
ALP SERPL-CCNC: 37 U/L (ref 39–117)
ALT SERPL W P-5'-P-CCNC: 26 U/L (ref 1–41)
ANION GAP SERPL CALCULATED.3IONS-SCNC: 12.7 MMOL/L (ref 5–15)
AST SERPL-CCNC: 27 U/L (ref 1–40)
BACTERIA SPEC AEROBE CULT: NO GROWTH
BACTERIA UR QL AUTO: ABNORMAL /HPF
BASOPHILS # BLD AUTO: 0.03 10*3/MM3 (ref 0–0.2)
BASOPHILS NFR BLD AUTO: 0.3 % (ref 0–1.5)
BILIRUB SERPL-MCNC: 0.7 MG/DL (ref 0–1.2)
BILIRUB UR QL STRIP: NEGATIVE
BUN SERPL-MCNC: 44 MG/DL (ref 8–23)
BUN/CREAT SERPL: 12.4 (ref 7–25)
CALCIUM SPEC-SCNC: 8.4 MG/DL (ref 8.6–10.5)
CHLORIDE SERPL-SCNC: 99 MMOL/L (ref 98–107)
CLARITY UR: ABNORMAL
CO2 SERPL-SCNC: 26.3 MMOL/L (ref 22–29)
COLOR UR: ABNORMAL
CREAT SERPL-MCNC: 3.56 MG/DL (ref 0.76–1.27)
D-LACTATE SERPL-SCNC: 1.7 MMOL/L (ref 0.5–2)
DEPRECATED RDW RBC AUTO: 39.8 FL (ref 37–54)
EGFRCR SERPLBLD CKD-EPI 2021: 18.3 ML/MIN/1.73
EOSINOPHIL # BLD AUTO: 0.21 10*3/MM3 (ref 0–0.4)
EOSINOPHIL NFR BLD AUTO: 1.9 % (ref 0.3–6.2)
ERYTHROCYTE [DISTWIDTH] IN BLOOD BY AUTOMATED COUNT: 11.2 % (ref 12.3–15.4)
GLOBULIN UR ELPH-MCNC: 3.9 GM/DL
GLUCOSE SERPL-MCNC: 171 MG/DL (ref 65–99)
GLUCOSE UR STRIP-MCNC: NEGATIVE MG/DL
HCT VFR BLD AUTO: 34.6 % (ref 37.5–51)
HGB BLD-MCNC: 12.1 G/DL (ref 13–17.7)
HGB UR QL STRIP.AUTO: ABNORMAL
HOLD SPECIMEN: NORMAL
HOLD SPECIMEN: NORMAL
HYALINE CASTS UR QL AUTO: ABNORMAL /LPF
IMM GRANULOCYTES # BLD AUTO: 0.04 10*3/MM3 (ref 0–0.05)
IMM GRANULOCYTES NFR BLD AUTO: 0.4 % (ref 0–0.5)
KETONES UR QL STRIP: NEGATIVE
LEUKOCYTE ESTERASE UR QL STRIP.AUTO: ABNORMAL
LIPASE SERPL-CCNC: 39 U/L (ref 13–60)
LYMPHOCYTES # BLD AUTO: 1.27 10*3/MM3 (ref 0.7–3.1)
LYMPHOCYTES NFR BLD AUTO: 11.7 % (ref 19.6–45.3)
MCH RBC QN AUTO: 33.8 PG (ref 26.6–33)
MCHC RBC AUTO-ENTMCNC: 35 G/DL (ref 31.5–35.7)
MCV RBC AUTO: 96.6 FL (ref 79–97)
MONOCYTES # BLD AUTO: 0.83 10*3/MM3 (ref 0.1–0.9)
MONOCYTES NFR BLD AUTO: 7.6 % (ref 5–12)
NEUTROPHILS NFR BLD AUTO: 78.1 % (ref 42.7–76)
NEUTROPHILS NFR BLD AUTO: 8.5 10*3/MM3 (ref 1.7–7)
NITRITE UR QL STRIP: NEGATIVE
NRBC BLD AUTO-RTO: 0 /100 WBC (ref 0–0.2)
PH UR STRIP.AUTO: 5.5 [PH] (ref 5–8)
PLATELET # BLD AUTO: 219 10*3/MM3 (ref 140–450)
PMV BLD AUTO: 10.2 FL (ref 6–12)
POTASSIUM SERPL-SCNC: 3.7 MMOL/L (ref 3.5–5.2)
PROT SERPL-MCNC: 7.6 G/DL (ref 6–8.5)
PROT UR QL STRIP: ABNORMAL
RBC # BLD AUTO: 3.58 10*6/MM3 (ref 4.14–5.8)
RBC # UR STRIP: ABNORMAL /HPF
REF LAB TEST METHOD: ABNORMAL
SARS-COV-2 RNA RESP QL NAA+PROBE: NOT DETECTED
SODIUM SERPL-SCNC: 138 MMOL/L (ref 136–145)
SP GR UR STRIP: 1.01 (ref 1–1.03)
SQUAMOUS #/AREA URNS HPF: ABNORMAL /HPF
UROBILINOGEN UR QL STRIP: ABNORMAL
WBC # UR STRIP: ABNORMAL /HPF
WBC NRBC COR # BLD: 10.88 10*3/MM3 (ref 3.4–10.8)
WHOLE BLOOD HOLD COAG: NORMAL
WHOLE BLOOD HOLD SPECIMEN: NORMAL

## 2023-03-18 PROCEDURE — 81001 URINALYSIS AUTO W/SCOPE: CPT

## 2023-03-18 PROCEDURE — 83690 ASSAY OF LIPASE: CPT

## 2023-03-18 PROCEDURE — 85025 COMPLETE CBC W/AUTO DIFF WBC: CPT

## 2023-03-18 PROCEDURE — 36415 COLL VENOUS BLD VENIPUNCTURE: CPT

## 2023-03-18 PROCEDURE — 71045 X-RAY EXAM CHEST 1 VIEW: CPT

## 2023-03-18 PROCEDURE — 80053 COMPREHEN METABOLIC PANEL: CPT

## 2023-03-18 PROCEDURE — 83605 ASSAY OF LACTIC ACID: CPT

## 2023-03-18 PROCEDURE — 87086 URINE CULTURE/COLONY COUNT: CPT | Performed by: NURSE PRACTITIONER

## 2023-03-18 PROCEDURE — 51798 US URINE CAPACITY MEASURE: CPT

## 2023-03-18 PROCEDURE — 74176 CT ABD & PELVIS W/O CONTRAST: CPT

## 2023-03-18 PROCEDURE — 99283 EMERGENCY DEPT VISIT LOW MDM: CPT

## 2023-03-18 RX ORDER — SODIUM CHLORIDE 0.9 % (FLUSH) 0.9 %
10 SYRINGE (ML) INJECTION AS NEEDED
Status: DISCONTINUED | OUTPATIENT
Start: 2023-03-18 | End: 2023-03-19 | Stop reason: HOSPADM

## 2023-03-18 NOTE — ASSESSMENT & PLAN NOTE
His pulmonary status however seems to be pretty much at baseline.    In fact he is not even having issues with wheezing today.  We will go ahead and repeat a chest x-ray to make sure he has not developed an infiltrate over the past couple of days.  Might end up needing a CT scan of the chest

## 2023-03-18 NOTE — ASSESSMENT & PLAN NOTE
His clinical picture is 1 that is a little bit hard to put together.  Perhaps he had a bout of COVID with a false negative test resulted ER.  This could explain his cough, drop in renal function, and leukocytosis.

## 2023-03-18 NOTE — ED PROVIDER NOTES
Time: 3:11 PM EDT  Date of encounter:  3/18/2023  Independent Historian/Clinical History and Information was obtained by:   Patient  Chief Complaint   Patient presents with   • Fever   • Abnormal Lab       History is limited by: N/A    History of Present Illness:  Patient is a 63 y.o. year old male who presents to the emergency department for evaluation of abnormal labs and a fever onset today. PT was recently seen in the ED and was diagnosed with Bronchitis on 3/14. Pt has a hx of CKD. Pt has had some episodes of coughing and back pain and was told to come to ED by his PCP after some abnormal lab findings. PT reports some polyuria.    HPI    Patient Care Team  Primary Care Provider: Kuldeep Winters MD    Past Medical History:     Allergies   Allergen Reactions   • Benadryl [Diphenhydramine] Palpitations     Past Medical History:   Diagnosis Date   • Acute bronchitis, unspecified    • Acute upper respiratory infection, unspecified    • Anxiety disorder, unspecified    • Anxiety with depression    • Chronic kidney disease, stage III (moderate) ([facility])    • Colon polyp    • Cough    • Dizziness and giddiness    • Emphysema, unspecified ([facility])    • Fatigue    • GERD (gastroesophageal reflux disease)    • Heartburn    • Hypertension    • Mild intermittent acute bronchitis with asthma with acute exacerbation    • Nonrheumatic aortic (valve) stenosis    • Vision problem      Past Surgical History:   Procedure Laterality Date   • CIRCUMCISION  2005   • COLONOSCOPY W/ BIOPSIES AND POLYPECTOMY     • ENDOSCOPY  02/2013   • HAND SURGERY Right     INJURY TO R HAND   • KNEE ARTHROSCOPY Left      Family History   Problem Relation Age of Onset   • Coronary artery disease Mother    • Pulmonary embolism Mother         POST OP   • Heart attack Brother    • Prostate cancer Brother    • COPD Brother    • Alcohol abuse Maternal Grandfather        Home Medications:  Prior to Admission medications    Medication Sig Start Date End Date  Taking? Authorizing Provider   albuterol sulfate  (90 Base) MCG/ACT inhaler Inhale 2 puffs Every 4 (Four) Hours As Needed for Wheezing or Shortness of Air. 22   Katia Chavez APRN   albuterol sulfate  (90 Base) MCG/ACT inhaler Inhale 2 puffs Every 4 (Four) Hours As Needed for Wheezing. 3/14/23   Alexsander Patel MD   amLODIPine (NORVASC) 10 MG tablet Take 1 tablet by mouth once daily 23   Kuldeep Winters MD   Budeson-Glycopyrrol-Formoterol (BREZTRI) 160-9-4.8 MCG/ACT aerosol inhaler Inhale 2 puffs 2 (Two) Times a Day. 10/10/22   Kuldeep Winters MD   cefuroxime (CEFTIN) 500 MG tablet Take 1 tablet by mouth Daily. 3/17/23   Kuldeep Winters MD   doxazosin (CARDURA) 2 MG tablet TAKE 1 TABLET BY MOUTH ONCE DAILY AT NIGHT 10/10/22   Kuldeep Winters MD   losartan-hydrochlorothiazide (HYZAAR) 100-25 MG per tablet Take 1 tablet by mouth once daily 22   Kuldeep Winters MD   omeprazole (priLOSEC) 20 MG capsule TAKE 1 CAPSULE BY MOUTH ONCE DAILY AS NEEDED 10/10/22   Kuldeep Winters MD   traMADol (ULTRAM) 50 MG tablet Take 1 tablet by mouth Every 8 (Eight) Hours As Needed for Moderate Pain. 3/14/23   Alexsander Patel MD        Social History:   Social History     Tobacco Use   • Smoking status: Former     Packs/day: 0.25     Years: 30.00     Pack years: 7.50     Types: Cigarettes     Start date:      Quit date:      Years since quittin.2   • Smokeless tobacco: Former     Types: Chew   Vaping Use   • Vaping Use: Never used   Substance Use Topics   • Alcohol use: Not Currently     Comment: non-drinker   • Drug use: Never     Comment: none         Review of Systems:  Review of Systems   Constitutional: Negative for chills and fever.   HENT: Negative for sore throat.    Eyes: Negative for photophobia.   Respiratory: Positive for cough. Negative for shortness of breath.    Cardiovascular: Negative for chest pain.   Gastrointestinal: Negative for  "abdominal pain, diarrhea, nausea and vomiting.   Genitourinary: Positive for frequency. Negative for dysuria.   Musculoskeletal: Positive for back pain. Negative for neck pain.   Skin: Negative for wound.   Neurological: Negative for headaches.   All other systems reviewed and are negative.       Physical Exam:  /69   Pulse 78   Temp 97.8 °F (36.6 °C) (Oral)   Resp 20   Ht 175.3 cm (69\")   Wt 90.7 kg (199 lb 15.3 oz)   SpO2 95%   BMI 29.53 kg/m²     Physical Exam  Vitals and nursing note reviewed.   Constitutional:       General: He is not in acute distress.  HENT:      Head: Normocephalic and atraumatic.   Eyes:      Extraocular Movements: Extraocular movements intact.   Cardiovascular:      Rate and Rhythm: Normal rate and regular rhythm.   Pulmonary:      Effort: Pulmonary effort is normal. No respiratory distress.      Breath sounds: Normal breath sounds.   Abdominal:      General: Abdomen is flat.      Palpations: Abdomen is soft.      Tenderness: There is no abdominal tenderness.   Musculoskeletal:         General: Normal range of motion.      Cervical back: Normal range of motion and neck supple.      Right lower leg: No edema.      Left lower leg: No edema.   Skin:     General: Skin is warm and dry.      Capillary Refill: Capillary refill takes less than 2 seconds.   Neurological:      Mental Status: He is alert and oriented to person, place, and time. Mental status is at baseline.                  Procedures:  Procedures      Medical Decision Making:      Comorbidities that affect care:    Chronic Kidney Disease    External Notes reviewed:    Previous ED Note: seen 3/14/23 for dyspnea      The following orders were placed and all results were independently analyzed by me:  Orders Placed This Encounter   Procedures   • Urine Culture - Urine,   • XR Chest 1 View   • CT Abdomen Pelvis Without Contrast   • [place] Draw   • Comprehensive Metabolic Panel   • Lipase   • Urinalysis With Microscopic If " Indicated (No Culture) - Urine, Clean Catch   • Lactic Acid, Plasma   • CBC Auto Differential   • Urinalysis, Microscopic Only - Urine, Clean Catch   • Undress & Gown   • Bladder scan   • CBC & Differential   • Green Top (Gel)   • Lavender Top   • Gold Top - SST   • Light Blue Top       Medications Given in the Emergency Department:  Medications - No data to display     ED Course:    The patient was initially evaluated in the triage area where orders were placed. The patient was later dispositioned by Aris Soriano DO.      The patient was advised to stay for completion of workup which includes but is not limited to communication of labs and radiological results, reassessment and plan. The patient was advised that leaving prior to disposition by a provider could result in critical findings that are not communicated to the patient.          Labs:    Lab Results (last 24 hours)     Procedure Component Value Units Date/Time    CBC & Differential [[id number]]  (Abnormal) Collected: 03/20/23 1427    Specimen: Blood Updated: 03/20/23 1517    Narrative:      The following orders were created for panel order CBC & Differential.  Procedure                               Abnormality         Status                     ---------                               -----------         ------                     CBC Auto Differential[[id number]]        Abnormal            Final result                 Please view results for these tests on the individual orders.    Basic Metabolic Panel [[id number]] Collected: 03/20/23 1427    Specimen: Blood Updated: 03/20/23 1427    Urinalysis With Microscopic If Indicated (No Culture) - Urine, Clean Catch [[id number]]  (Abnormal) Collected: 03/20/23 1427    Specimen: Urine, Clean Catch Updated: 03/20/23 1513     Color, UA Dark Yellow     Appearance, UA Cloudy     pH, UA <=5.0     Specific Gravity, UA 1.015     Glucose, UA Negative     Ketones, UA Negative     Bilirubin, UA Negative     Blood, UA Large (3+)      Protein, UA >=300 mg/dL (3+)     Leuk Esterase, UA Negative     Nitrite, UA Negative     Urobilinogen, UA 0.2 E.U./dL    Protein / Creatinine Ratio, Urine - Urine, Clean Catch [[id number]] Collected: 03/20/23 1427    Specimen: Urine, Clean Catch Updated: 03/20/23 1431    PTH, Intact [[id number]] Collected: 03/20/23 1427    Specimen: Blood Updated: 03/20/23 1428    Phosphorus [[id number]] Collected: 03/20/23 1427    Specimen: Blood Updated: 03/20/23 1427    C3 Complement [[id number]] Collected: 03/20/23 1427    Specimen: Blood Updated: 03/20/23 1427    C4 Complement [[id number]] Collected: 03/20/23 1427    Specimen: Blood Updated: 03/20/23 1427    NAZ Comprehensive Panel [[id number]] Collected: 03/20/23 1427    Specimen: Blood Updated: 03/20/23 1427    Anti-neutrophilic Cytoplasmic Antibody [[id number]] Collected: 03/20/23 1427    Specimen: Blood Updated: 03/20/23 1428    CBC Auto Differential [[id number]]  (Abnormal) Collected: 03/20/23 1427    Specimen: Blood Updated: 03/20/23 1517     WBC 12.03 10*3/mm3      RBC 3.59 10*6/mm3      Hemoglobin 11.8 g/dL      Hematocrit 34.8 %      MCV 96.9 fL      MCH 32.9 pg      MCHC 33.9 g/dL      RDW 11.3 %      RDW-SD 40.5 fl      MPV 10.7 fL      Platelets 277 10*3/mm3      Neutrophil % 79.3 %      Lymphocyte % 9.1 %      Monocyte % 10.1 %      Eosinophil % 1.1 %      Basophil % 0.2 %      Immature Grans % 0.2 %      Neutrophils, Absolute 9.53 10*3/mm3      Lymphocytes, Absolute 1.10 10*3/mm3      Monocytes, Absolute 1.22 10*3/mm3      Eosinophils, Absolute 0.13 10*3/mm3      Basophils, Absolute 0.02 10*3/mm3      Immature Grans, Absolute 0.03 10*3/mm3     Urinalysis, Microscopic Only - Urine, Clean Catch [[id number]]  (Abnormal) Collected: 03/20/23 1427    Specimen: Urine, Clean Catch Updated: 03/20/23 1527     RBC, UA Too Numerous to Count /HPF      WBC, UA 13-20 /HPF      Bacteria, UA Trace /HPF      Squamous Epithelial Cells, UA 0-2 /HPF      Transitional Epithelial Cells, UA  0-2 /HPF      Renal Epithelial Cells, UA 0-2 /HPF      WBC Casts 0-2 /LPF      Granular Casts, UA 0-2 /LPF      Methodology Manual Light Microscopy           Imaging:    No Radiology Exams Resulted Within Past 24 Hours      Differential Diagnosis and Discussion:      Abdominal Pain: Based on the patient's signs and symptoms, I considered abdominal aortic aneurysm, small bowel obstruction, pancreatitis, acute cholecystitis, acute appendecitis, peptic ulcer disease, gastritis, colitis, endocrine disorders, irritable bowel syndrome and other differential diagnosis an etiology of the patient's abdominal pain.  Fever: Based on the complaint of fever, differential diagnosis includes but is not limited to meningitis, pneumonia, pyelonephritis, acute uti,  systemic immune response syndrome, sepsis, viral syndrome, fungal infection, tick born illness and other bacterial infections.    All labs were reviewed and interpreted by me.    MDM   Patient is currently on Abx for UTI    Patient Care Considerations:          Consultants/Shared Management Plan:    Consultant: I have discussed the case with Dr. Pabon. Nephrology who states patient can be discharged with office follow up on Monday    Social Determinants of Health:    Patient is independent, reliable, and has access to care.       Disposition and Care Coordination:    Discharged: I considered escalation of care by admitting this patient for observation, however the patient has improved and is suitable and  stable for discharge.      Final diagnoses:   Acute kidney injury (HCC)   Acute urinary tract infection        ED Disposition     ED Disposition   Discharge    Condition   Stable    Comment   --             This medical record created using voice recognition software.           Mateo Honeycutt Jr.  03/18/23 1756       Mateo Honeycutt Jr.  03/18/23 1757       Aris Soriano DO  03/20/23 [id number]

## 2023-03-19 LAB — BACTERIA SPEC AEROBE CULT: NO GROWTH

## 2023-03-19 NOTE — DISCHARGE INSTRUCTIONS
Contact 's office on Monday for further instructions regarding repeat lab work.  Stay well-hydrated.  Continue taking your antibiotics as prescribed.

## 2023-03-20 ENCOUNTER — LAB (OUTPATIENT)
Dept: LAB | Facility: HOSPITAL | Age: 63
End: 2023-03-20
Payer: MEDICARE

## 2023-03-20 ENCOUNTER — TRANSCRIBE ORDERS (OUTPATIENT)
Dept: ADMINISTRATIVE | Facility: HOSPITAL | Age: 63
End: 2023-03-20
Payer: MEDICARE

## 2023-03-20 DIAGNOSIS — N18.31 CHRONIC KIDNEY DISEASE (CKD) STAGE G3A/A1, MODERATELY DECREASED GLOMERULAR FILTRATION RATE (GFR) BETWEEN 45-59 ML/MIN/1.73 SQUARE METER AND ALBUMINURIA CREATININE RATIO LESS THAN 30 MG/G (CMS/H*: ICD-10-CM

## 2023-03-20 DIAGNOSIS — N18.31 CHRONIC KIDNEY DISEASE (CKD) STAGE G3A/A1, MODERATELY DECREASED GLOMERULAR FILTRATION RATE (GFR) BETWEEN 45-59 ML/MIN/1.73 SQUARE METER AND ALBUMINURIA CREATININE RATIO LESS THAN 30 MG/G (CMS/H*: Primary | ICD-10-CM

## 2023-03-20 LAB
BACTERIA UR QL AUTO: ABNORMAL /HPF
BASOPHILS # BLD AUTO: 0.02 10*3/MM3 (ref 0–0.2)
BASOPHILS NFR BLD AUTO: 0.2 % (ref 0–1.5)
BILIRUB UR QL STRIP: NEGATIVE
CLARITY UR: ABNORMAL
COLOR UR: ABNORMAL
CREAT UR-MCNC: 178.5 MG/DL
DEPRECATED RDW RBC AUTO: 40.5 FL (ref 37–54)
EOSINOPHIL # BLD AUTO: 0.13 10*3/MM3 (ref 0–0.4)
EOSINOPHIL NFR BLD AUTO: 1.1 % (ref 0.3–6.2)
ERYTHROCYTE [DISTWIDTH] IN BLOOD BY AUTOMATED COUNT: 11.3 % (ref 12.3–15.4)
GLUCOSE UR STRIP-MCNC: NEGATIVE MG/DL
GRAN CASTS URNS QL MICRO: ABNORMAL /LPF
HCT VFR BLD AUTO: 34.8 % (ref 37.5–51)
HGB BLD-MCNC: 11.8 G/DL (ref 13–17.7)
HGB UR QL STRIP.AUTO: ABNORMAL
IMM GRANULOCYTES # BLD AUTO: 0.03 10*3/MM3 (ref 0–0.05)
IMM GRANULOCYTES NFR BLD AUTO: 0.2 % (ref 0–0.5)
KETONES UR QL STRIP: NEGATIVE
LEUKOCYTE ESTERASE UR QL STRIP.AUTO: NEGATIVE
LYMPHOCYTES # BLD AUTO: 1.1 10*3/MM3 (ref 0.7–3.1)
LYMPHOCYTES NFR BLD AUTO: 9.1 % (ref 19.6–45.3)
MCH RBC QN AUTO: 32.9 PG (ref 26.6–33)
MCHC RBC AUTO-ENTMCNC: 33.9 G/DL (ref 31.5–35.7)
MCV RBC AUTO: 96.9 FL (ref 79–97)
MONOCYTES # BLD AUTO: 1.22 10*3/MM3 (ref 0.1–0.9)
MONOCYTES NFR BLD AUTO: 10.1 % (ref 5–12)
NEUTROPHILS NFR BLD AUTO: 79.3 % (ref 42.7–76)
NEUTROPHILS NFR BLD AUTO: 9.53 10*3/MM3 (ref 1.7–7)
NITRITE UR QL STRIP: NEGATIVE
PH UR STRIP.AUTO: <=5 [PH] (ref 5–8)
PLATELET # BLD AUTO: 277 10*3/MM3 (ref 140–450)
PMV BLD AUTO: 10.7 FL (ref 6–12)
PROT ?TM UR-MCNC: 109 MG/DL
PROT UR QL STRIP: ABNORMAL
PROT/CREAT UR: 0.61 MG/G{CREAT}
RBC # BLD AUTO: 3.59 10*6/MM3 (ref 4.14–5.8)
RBC # UR STRIP: ABNORMAL /HPF
REF LAB TEST METHOD: ABNORMAL
RENAL EPI CELLS #/AREA URNS HPF: ABNORMAL /HPF
SP GR UR STRIP: 1.01 (ref 1–1.03)
SQUAMOUS #/AREA URNS HPF: ABNORMAL /HPF
TRANS CELLS #/AREA URNS HPF: ABNORMAL /HPF
UROBILINOGEN UR QL STRIP: ABNORMAL
WBC # UR STRIP: ABNORMAL /HPF
WBC CASTS #/AREA URNS LPF: ABNORMAL /LPF
WBC NRBC COR # BLD: 12.03 10*3/MM3 (ref 3.4–10.8)

## 2023-03-20 PROCEDURE — 86235 NUCLEAR ANTIGEN ANTIBODY: CPT

## 2023-03-20 PROCEDURE — 86160 COMPLEMENT ANTIGEN: CPT

## 2023-03-20 PROCEDURE — 86037 ANCA TITER EACH ANTIBODY: CPT

## 2023-03-20 PROCEDURE — 80048 BASIC METABOLIC PNL TOTAL CA: CPT

## 2023-03-20 PROCEDURE — 84156 ASSAY OF PROTEIN URINE: CPT

## 2023-03-20 PROCEDURE — 84100 ASSAY OF PHOSPHORUS: CPT

## 2023-03-20 PROCEDURE — 85025 COMPLETE CBC W/AUTO DIFF WBC: CPT

## 2023-03-20 PROCEDURE — 81001 URINALYSIS AUTO W/SCOPE: CPT

## 2023-03-20 PROCEDURE — 82570 ASSAY OF URINE CREATININE: CPT

## 2023-03-20 PROCEDURE — 36415 COLL VENOUS BLD VENIPUNCTURE: CPT

## 2023-03-20 PROCEDURE — 83970 ASSAY OF PARATHORMONE: CPT

## 2023-03-20 PROCEDURE — 86225 DNA ANTIBODY NATIVE: CPT

## 2023-03-21 LAB
ANION GAP SERPL CALCULATED.3IONS-SCNC: 14.6 MMOL/L (ref 5–15)
BUN SERPL-MCNC: 61 MG/DL (ref 8–23)
BUN/CREAT SERPL: 13.5 (ref 7–25)
C3 SERPL-MCNC: 158 MG/DL (ref 82–167)
C4 SERPL-MCNC: 33 MG/DL (ref 14–44)
CALCIUM SPEC-SCNC: 9 MG/DL (ref 8.6–10.5)
CHLORIDE SERPL-SCNC: 98 MMOL/L (ref 98–107)
CO2 SERPL-SCNC: 24.4 MMOL/L (ref 22–29)
CREAT SERPL-MCNC: 4.53 MG/DL (ref 0.76–1.27)
EGFRCR SERPLBLD CKD-EPI 2021: 13.7 ML/MIN/1.73
GLUCOSE SERPL-MCNC: 109 MG/DL (ref 65–99)
PHOSPHATE SERPL-MCNC: 4.9 MG/DL (ref 2.5–4.5)
POTASSIUM SERPL-SCNC: 3.6 MMOL/L (ref 3.5–5.2)
PTH-INTACT SERPL-MCNC: 118 PG/ML (ref 15–65)
SODIUM SERPL-SCNC: 137 MMOL/L (ref 136–145)

## 2023-03-22 LAB
C-ANCA TITR SER IF: NORMAL TITER
CENTROMERE B AB SER-ACNC: <0.2 AI (ref 0–0.9)
CHROMATIN AB SERPL-ACNC: <0.2 AI (ref 0–0.9)
DSDNA AB SER-ACNC: <1 IU/ML (ref 0–9)
ENA JO1 AB SER-ACNC: <0.2 AI (ref 0–0.9)
ENA RNP AB SER-ACNC: 0.4 AI (ref 0–0.9)
ENA SCL70 AB SER-ACNC: 0.2 AI (ref 0–0.9)
ENA SM AB SER-ACNC: <0.2 AI (ref 0–0.9)
ENA SS-A AB SER-ACNC: <0.2 AI (ref 0–0.9)
ENA SS-B AB SER-ACNC: <0.2 AI (ref 0–0.9)
Lab: NORMAL
P-ANCA ATYPICAL TITR SER IF: NORMAL TITER
P-ANCA TITR SER IF: NORMAL TITER

## 2023-03-24 LAB
ALBUMIN SERPL-MCNC: 3.6 G/DL (ref 3.5–5.2)
ALBUMIN/GLOB SERPL: 0.8 G/DL
ALP SERPL-CCNC: 43 U/L (ref 39–117)
ALT SERPL W P-5'-P-CCNC: 66 U/L (ref 1–41)
ANION GAP SERPL CALCULATED.3IONS-SCNC: 13.4 MMOL/L (ref 5–15)
AST SERPL-CCNC: 36 U/L (ref 1–40)
BASOPHILS # BLD AUTO: 0.04 10*3/MM3 (ref 0–0.2)
BASOPHILS NFR BLD AUTO: 0.4 % (ref 0–1.5)
BILIRUB SERPL-MCNC: 0.2 MG/DL (ref 0–1.2)
BUN SERPL-MCNC: 65 MG/DL (ref 8–23)
BUN/CREAT SERPL: 14.8 (ref 7–25)
CALCIUM SPEC-SCNC: 8.7 MG/DL (ref 8.6–10.5)
CHLORIDE SERPL-SCNC: 99 MMOL/L (ref 98–107)
CO2 SERPL-SCNC: 25.6 MMOL/L (ref 22–29)
CREAT SERPL-MCNC: 4.38 MG/DL (ref 0.76–1.27)
DEPRECATED RDW RBC AUTO: 39 FL (ref 37–54)
EGFRCR SERPLBLD CKD-EPI 2021: 14.3 ML/MIN/1.73
EOSINOPHIL # BLD AUTO: 0.35 10*3/MM3 (ref 0–0.4)
EOSINOPHIL NFR BLD AUTO: 3.1 % (ref 0.3–6.2)
ERYTHROCYTE [DISTWIDTH] IN BLOOD BY AUTOMATED COUNT: 11 % (ref 12.3–15.4)
GLOBULIN UR ELPH-MCNC: 4.6 GM/DL
GLUCOSE SERPL-MCNC: 166 MG/DL (ref 65–99)
HCT VFR BLD AUTO: 34.1 % (ref 37.5–51)
HGB BLD-MCNC: 11.9 G/DL (ref 13–17.7)
HOLD SPECIMEN: NORMAL
HOLD SPECIMEN: NORMAL
IMM GRANULOCYTES # BLD AUTO: 0.05 10*3/MM3 (ref 0–0.05)
IMM GRANULOCYTES NFR BLD AUTO: 0.4 % (ref 0–0.5)
LYMPHOCYTES # BLD AUTO: 1.29 10*3/MM3 (ref 0.7–3.1)
LYMPHOCYTES NFR BLD AUTO: 11.6 % (ref 19.6–45.3)
MCH RBC QN AUTO: 33.3 PG (ref 26.6–33)
MCHC RBC AUTO-ENTMCNC: 34.9 G/DL (ref 31.5–35.7)
MCV RBC AUTO: 95.5 FL (ref 79–97)
MONOCYTES # BLD AUTO: 0.74 10*3/MM3 (ref 0.1–0.9)
MONOCYTES NFR BLD AUTO: 6.6 % (ref 5–12)
NEUTROPHILS NFR BLD AUTO: 77.9 % (ref 42.7–76)
NEUTROPHILS NFR BLD AUTO: 8.67 10*3/MM3 (ref 1.7–7)
NRBC BLD AUTO-RTO: 0 /100 WBC (ref 0–0.2)
PLATELET # BLD AUTO: 390 10*3/MM3 (ref 140–450)
PMV BLD AUTO: 9.9 FL (ref 6–12)
POTASSIUM SERPL-SCNC: 3.9 MMOL/L (ref 3.5–5.2)
PROT SERPL-MCNC: 8.2 G/DL (ref 6–8.5)
RBC # BLD AUTO: 3.57 10*6/MM3 (ref 4.14–5.8)
SODIUM SERPL-SCNC: 138 MMOL/L (ref 136–145)
WBC NRBC COR # BLD: 11.14 10*3/MM3 (ref 3.4–10.8)
WHOLE BLOOD HOLD COAG: NORMAL
WHOLE BLOOD HOLD SPECIMEN: NORMAL

## 2023-03-24 PROCEDURE — 36415 COLL VENOUS BLD VENIPUNCTURE: CPT

## 2023-03-24 PROCEDURE — 80053 COMPREHEN METABOLIC PANEL: CPT

## 2023-03-24 PROCEDURE — 83970 ASSAY OF PARATHORMONE: CPT | Performed by: PHYSICIAN ASSISTANT

## 2023-03-24 PROCEDURE — 85025 COMPLETE CBC W/AUTO DIFF WBC: CPT

## 2023-03-24 PROCEDURE — 99284 EMERGENCY DEPT VISIT MOD MDM: CPT

## 2023-03-24 PROCEDURE — 83735 ASSAY OF MAGNESIUM: CPT | Performed by: PHYSICIAN ASSISTANT

## 2023-03-24 PROCEDURE — 84100 ASSAY OF PHOSPHORUS: CPT | Performed by: PHYSICIAN ASSISTANT

## 2023-03-25 ENCOUNTER — HOSPITAL ENCOUNTER (INPATIENT)
Facility: HOSPITAL | Age: 63
LOS: 2 days | Discharge: HOME | DRG: 684 | End: 2023-03-28
Attending: EMERGENCY MEDICINE | Admitting: INTERNAL MEDICINE
Payer: MEDICARE

## 2023-03-25 DIAGNOSIS — R73.9 HYPERGLYCEMIA: ICD-10-CM

## 2023-03-25 DIAGNOSIS — N17.9 ACUTE KIDNEY INJURY: Primary | ICD-10-CM

## 2023-03-25 DIAGNOSIS — N17.9 ACUTE RENAL FAILURE SUPERIMPOSED ON STAGE 3A CHRONIC KIDNEY DISEASE, UNSPECIFIED ACUTE RENAL FAILURE TYPE: ICD-10-CM

## 2023-03-25 DIAGNOSIS — N18.31 ACUTE RENAL FAILURE SUPERIMPOSED ON STAGE 3A CHRONIC KIDNEY DISEASE, UNSPECIFIED ACUTE RENAL FAILURE TYPE: ICD-10-CM

## 2023-03-25 LAB
ANION GAP SERPL CALCULATED.3IONS-SCNC: 12.4 MMOL/L (ref 5–15)
BACTERIA UR QL AUTO: ABNORMAL /HPF
BILIRUB UR QL STRIP: NEGATIVE
BUN SERPL-MCNC: 64 MG/DL (ref 8–23)
BUN/CREAT SERPL: 16.2 (ref 7–25)
CALCIUM SPEC-SCNC: 8.2 MG/DL (ref 8.6–10.5)
CHLORIDE SERPL-SCNC: 101 MMOL/L (ref 98–107)
CLARITY UR: ABNORMAL
CO2 SERPL-SCNC: 23.6 MMOL/L (ref 22–29)
COLOR UR: YELLOW
CREAT SERPL-MCNC: 3.95 MG/DL (ref 0.76–1.27)
DEPRECATED RDW RBC AUTO: 38.6 FL (ref 37–54)
EGFRCR SERPLBLD CKD-EPI 2021: 16.2 ML/MIN/1.73
ERYTHROCYTE [DISTWIDTH] IN BLOOD BY AUTOMATED COUNT: 11.1 % (ref 12.3–15.4)
GLUCOSE SERPL-MCNC: 104 MG/DL (ref 65–99)
GLUCOSE UR STRIP-MCNC: NEGATIVE MG/DL
HBA1C MFR BLD: 5.8 % (ref 4.8–5.6)
HCT VFR BLD AUTO: 30.9 % (ref 37.5–51)
HGB BLD-MCNC: 11.1 G/DL (ref 13–17.7)
HGB UR QL STRIP.AUTO: ABNORMAL
HYALINE CASTS UR QL AUTO: ABNORMAL /LPF
KETONES UR QL STRIP: NEGATIVE
LEUKOCYTE ESTERASE UR QL STRIP.AUTO: ABNORMAL
MAGNESIUM SERPL-MCNC: 2.1 MG/DL (ref 1.6–2.4)
MCH RBC QN AUTO: 34.2 PG (ref 26.6–33)
MCHC RBC AUTO-ENTMCNC: 35.9 G/DL (ref 31.5–35.7)
MCV RBC AUTO: 95.1 FL (ref 79–97)
NITRITE UR QL STRIP: NEGATIVE
PH UR STRIP.AUTO: <=5 [PH] (ref 5–8)
PHOSPHATE SERPL-MCNC: 4.3 MG/DL (ref 2.5–4.5)
PLATELET # BLD AUTO: 362 10*3/MM3 (ref 140–450)
PMV BLD AUTO: 10.3 FL (ref 6–12)
POTASSIUM SERPL-SCNC: 3.4 MMOL/L (ref 3.5–5.2)
PROT UR QL STRIP: ABNORMAL
PTH-INTACT SERPL-MCNC: 132.5 PG/ML (ref 15–65)
RBC # BLD AUTO: 3.25 10*6/MM3 (ref 4.14–5.8)
RBC # UR STRIP: ABNORMAL /HPF
REF LAB TEST METHOD: ABNORMAL
SODIUM SERPL-SCNC: 137 MMOL/L (ref 136–145)
SP GR UR STRIP: 1.02 (ref 1–1.03)
SQUAMOUS #/AREA URNS HPF: ABNORMAL /HPF
UROBILINOGEN UR QL STRIP: ABNORMAL
WBC # UR STRIP: ABNORMAL /HPF
WBC NRBC COR # BLD: 10.91 10*3/MM3 (ref 3.4–10.8)

## 2023-03-25 PROCEDURE — 0TB13ZX EXCISION OF LEFT KIDNEY, PERCUTANEOUS APPROACH, DIAGNOSTIC: ICD-10-PCS | Performed by: RADIOLOGY

## 2023-03-25 PROCEDURE — 83036 HEMOGLOBIN GLYCOSYLATED A1C: CPT | Performed by: FAMILY MEDICINE

## 2023-03-25 PROCEDURE — G0378 HOSPITAL OBSERVATION PER HR: HCPCS

## 2023-03-25 PROCEDURE — 94799 UNLISTED PULMONARY SVC/PX: CPT

## 2023-03-25 PROCEDURE — 99222 1ST HOSP IP/OBS MODERATE 55: CPT | Performed by: FAMILY MEDICINE

## 2023-03-25 PROCEDURE — 80048 BASIC METABOLIC PNL TOTAL CA: CPT | Performed by: FAMILY MEDICINE

## 2023-03-25 PROCEDURE — 85027 COMPLETE CBC AUTOMATED: CPT | Performed by: FAMILY MEDICINE

## 2023-03-25 PROCEDURE — 81001 URINALYSIS AUTO W/SCOPE: CPT | Performed by: NURSE PRACTITIONER

## 2023-03-25 PROCEDURE — 94640 AIRWAY INHALATION TREATMENT: CPT

## 2023-03-25 RX ORDER — HEPARIN SODIUM 5000 [USP'U]/ML
5000 INJECTION, SOLUTION INTRAVENOUS; SUBCUTANEOUS EVERY 12 HOURS SCHEDULED
Status: DISCONTINUED | OUTPATIENT
Start: 2023-03-25 | End: 2023-03-25

## 2023-03-25 RX ORDER — SODIUM CHLORIDE 0.9 % (FLUSH) 0.9 %
10 SYRINGE (ML) INJECTION EVERY 12 HOURS SCHEDULED
Status: DISCONTINUED | OUTPATIENT
Start: 2023-03-25 | End: 2023-03-28 | Stop reason: HOSPADM

## 2023-03-25 RX ORDER — POTASSIUM CHLORIDE 750 MG/1
20 CAPSULE, EXTENDED RELEASE ORAL ONCE
Status: COMPLETED | OUTPATIENT
Start: 2023-03-25 | End: 2023-03-25

## 2023-03-25 RX ORDER — FAMOTIDINE 20 MG/1
20 TABLET, FILM COATED ORAL DAILY
Status: DISCONTINUED | OUTPATIENT
Start: 2023-03-25 | End: 2023-03-28 | Stop reason: HOSPADM

## 2023-03-25 RX ORDER — AMLODIPINE BESYLATE 5 MG/1
10 TABLET ORAL
Status: DISCONTINUED | OUTPATIENT
Start: 2023-03-25 | End: 2023-03-28 | Stop reason: HOSPADM

## 2023-03-25 RX ORDER — SODIUM CHLORIDE 0.9 % (FLUSH) 0.9 %
10 SYRINGE (ML) INJECTION AS NEEDED
Status: DISCONTINUED | OUTPATIENT
Start: 2023-03-25 | End: 2023-03-28 | Stop reason: HOSPADM

## 2023-03-25 RX ORDER — SODIUM CHLORIDE, SODIUM LACTATE, POTASSIUM CHLORIDE, CALCIUM CHLORIDE 600; 310; 30; 20 MG/100ML; MG/100ML; MG/100ML; MG/100ML
20 INJECTION, SOLUTION INTRAVENOUS CONTINUOUS
Status: DISCONTINUED | OUTPATIENT
Start: 2023-03-25 | End: 2023-03-27

## 2023-03-25 RX ORDER — SODIUM CHLORIDE 9 MG/ML
40 INJECTION, SOLUTION INTRAVENOUS AS NEEDED
Status: DISCONTINUED | OUTPATIENT
Start: 2023-03-25 | End: 2023-03-28 | Stop reason: HOSPADM

## 2023-03-25 RX ADMIN — FAMOTIDINE 20 MG: 20 TABLET, FILM COATED ORAL at 13:58

## 2023-03-25 RX ADMIN — Medication 10 ML: at 09:21

## 2023-03-25 RX ADMIN — POTASSIUM CHLORIDE 20 MEQ: 10 CAPSULE, COATED, EXTENDED RELEASE ORAL at 13:58

## 2023-03-25 RX ADMIN — AMLODIPINE BESYLATE 10 MG: 5 TABLET ORAL at 13:58

## 2023-03-25 RX ADMIN — BUDESONIDE, GLYCOPYRROLATE, AND FORMOTEROL FUMARATE 2 PUFF: 160; 9; 4.8 AEROSOL, METERED RESPIRATORY (INHALATION) at 22:32

## 2023-03-25 RX ADMIN — SODIUM CHLORIDE, POTASSIUM CHLORIDE, SODIUM LACTATE AND CALCIUM CHLORIDE 125 ML/HR: 600; 310; 30; 20 INJECTION, SOLUTION INTRAVENOUS at 06:41

## 2023-03-25 RX ADMIN — SODIUM CHLORIDE 1000 ML: 9 INJECTION, SOLUTION INTRAVENOUS at 03:43

## 2023-03-25 NOTE — PROGRESS NOTES
[facility]   Hospitalist Progress Note  Date: 3/25/2023  Patient Name: Chad Oneill  : 1960  MRN: [id number]  Date of admission: 3/25/2023      Subjective   Subjective     Chief Complaint:   Lab abnormalities    Summary:   Chad Oneill is a 63 y.o. male brought into the emergency department for evaluation of abnormal labs.  Patient was contacted by his nephrologist office, recommended the patient come to the emergency department for evaluation of abnormal laboratory results.  Patient denies any acute symptoms, however, followed through with recommendations and presented to the ED.  Patient states he has been making urine without any difficulty or urinary symptoms.  Patient has been having his creatinine monitored regularly by his PCP and nephrologist.  There has been some discussion of considering dialysis therapy, patient has been reluctant given his mother's history and subsequent expiration while on dialysis.     Interval Followup:   Discussed with nephrologist this morning.  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  Patient will have additional serologies run here in the hospital and a possible kidney biopsy on Monday.    Review of Systems   All systems were reviewed and negative except for: Some lethargy however minimal complaints    Objective   Objective     Vitals:   Temp:  [97.9 °F (36.6 °C)-98.8 °F (37.1 °C)] 98.8 °F (37.1 °C)  Heart Rate:  [71-90] 73  Resp:  [18-20] 18  BP: (115-137)/(62-82) 123/62  Physical Exam    Constitutional: Awake, alert, no acute distress   Eyes: Pupils equal, sclerae anicteric, no conjunctival injection   HENT: NCAT, mucous membranes moist   Neck: Supple, no thyromegaly, no lymphadenopathy, trachea midline   Respiratory: Clear to auscultation bilaterally, nonlabored respirations    Cardiovascular: RRR, no murmurs, rubs, or gallops, palpable pedal pulses bilaterally   Gastrointestinal: Positive bowel sounds, soft,  nontender, nondistended   Musculoskeletal: No bilateral ankle edema, no clubbing or cyanosis to extremities   Psychiatric: Appropriate affect, cooperative   Neurologic: Oriented x 3, strength symmetric in all extremities, Cranial Nerves grossly intact to confrontation, speech clear   Skin: No rashes     Result Review    Result Review:  I have personally reviewed the results from 3/25/2023 and agree with these findings:  []  Laboratory  []  Microbiology  []  Radiology  []  EKG/Telemetry   []  Cardiology/Vascular   []  Pathology  []  Old records  []  Other:    Assessment & Plan   Assessment / Plan     Assessment/Plan:  MEDARDO  CKD stage IIIa  Hypertension  Microscopic hematuria  Proteinuria  GERD  COPD  Recently treated urinary tract infection    Plan:  Patient remains admitted to hospital for further care management  Nephrology consulted, appreciate assistance  Patient with rapidly progressive rising creatinine, additional serologies have been ordered by nephrologist  Discussing with nephrologist will likely perform kidney biopsy Monday  Holding anticoagulation for possible renal biopsy Monday, patient contemplative at this time  Continuing IV fluids  Replacing potassium today     Discussed plan with RN, nephrologist    DVT prophylaxis:  Mechanical DVT prophylaxis orders are present.    CODE STATUS:   Level Of Support Discussed With: Patient  Code Status (Patient has no pulse and is not breathing): CPR (Attempt to Resuscitate)  Medical Interventions (Patient has pulse or is breathing): Full Support

## 2023-03-25 NOTE — ED PROVIDER NOTES
Time: 9:50 PM EDT  Date of encounter:  3/24/2023  Independent Historian/Clinical History and Information was obtained by:   Patient  Chief Complaint   Patient presents with   • Abnormal Lab       History is limited by: N/A    History of Present Illness:  Patient is a 63 y.o. year old male who presents to the emergency department for evaluation of abnormal labs.  The patient states that he got a call today from nephrologist stating his labs were abnormal and he needed to go to the Emergency Department immediately. He is unsure which labs were abnormal. States that he recently got diagnosed with stage II kidney disease.  Not on dialysis.  States he was seen recently for blood sugar but this seems to be clearing up.  Denies chest pain, palpitations, shortness of breath, nausea, vomiting, diarrhea.  He has chronic back pain since he was a teenager.     Per chart review Cr was higher than normal, elevated Phos and PTH. (Kavitha Vidal PA-C provider in triage 9:54 PM EDT )     The patient presents to the emergency department and states that he was called by his nephrologist office this evening and told to come to the emergency department.  States that he had recently been diagnosed with stage III kidney disease.  States that the he did speak with his nephrologist about being placed on dialysis states that he told his nephrologist that he did not want to be placed on dialysis.  He states that he has been urinating without any difficulties.  He denies any recent fevers.  He states he has had no abdominal pain basically reports that he feels fine.  He states that he had not been called and told to come to the emergency department by the doctor he would not be here and had no complaint.  Reviewing the patient's previous blood work and office visit notes it does appear that his kidney function and creatinine has increased over the last month from 2.4 up to 4.38 and has been slowly increasing over the last month or so.  He  "denies any recent fevers.  He states he was treated for urinary tract infection on March 18 in the emergency department and states that he took his last antibiotic last night.  He denies any nausea or vomiting.  When asked tonight if the patient would be admitted if the nephrologist wanted to admit him the patient states \"I do not know\".      History provided by:  Patient   used: No        Patient Care Team  Primary Care Provider: Kuldeep Winters MD    Past Medical History:     Allergies   Allergen Reactions   • Benadryl [Diphenhydramine] Palpitations     Past Medical History:   Diagnosis Date   • Acute bronchitis, unspecified    • Acute upper respiratory infection, unspecified    • Anxiety disorder, unspecified    • Anxiety with depression    • Chronic kidney disease, stage III (moderate) ([facility])    • Colon polyp    • Cough    • Dizziness and giddiness    • Emphysema, unspecified ([facility])    • Fatigue    • GERD (gastroesophageal reflux disease)    • Heartburn    • Hypertension    • Mild intermittent acute bronchitis with asthma with acute exacerbation    • Nonrheumatic aortic (valve) stenosis    • Vision problem      Past Surgical History:   Procedure Laterality Date   • CIRCUMCISION  2005   • COLONOSCOPY W/ BIOPSIES AND POLYPECTOMY     • ENDOSCOPY  02/2013   • HAND SURGERY Right     INJURY TO R HAND   • KNEE ARTHROSCOPY Left      Family History   Problem Relation Age of Onset   • Coronary artery disease Mother    • Pulmonary embolism Mother         POST OP   • Heart attack Brother    • Prostate cancer Brother    • COPD Brother    • Alcohol abuse Maternal Grandfather        Home Medications:  Prior to Admission medications    Medication Sig Start Date End Date Taking? Authorizing Provider   albuterol sulfate  (90 Base) MCG/ACT inhaler Inhale 2 puffs Every 4 (Four) Hours As Needed for Wheezing or Shortness of Air. 8/29/22   Katia Chavez APRN   albuterol sulfate  (90 Base) " MCG/ACT inhaler Inhale 2 puffs Every 4 (Four) Hours As Needed for Wheezing. 3/14/23   Alexsander Patel MD   amLODIPine (NORVASC) 10 MG tablet Take 1 tablet by mouth once daily 23   Kuldeep Winters MD   Budeson-Glycopyrrol-Formoterol (BREZTRI) 160-9-4.8 MCG/ACT aerosol inhaler Inhale 2 puffs 2 (Two) Times a Day. 10/10/22   Kuldeep Winters MD   cefuroxime (CEFTIN) 500 MG tablet Take 1 tablet by mouth Daily. 3/17/23   Kuldeep Winters MD   doxazosin (CARDURA) 2 MG tablet TAKE 1 TABLET BY MOUTH ONCE DAILY AT NIGHT 10/10/22   Kuldeep Winters MD   losartan-hydrochlorothiazide (HYZAAR) 100-25 MG per tablet Take 1 tablet by mouth once daily 22   Kuldeep Winters MD   omeprazole (priLOSEC) 20 MG capsule TAKE 1 CAPSULE BY MOUTH ONCE DAILY AS NEEDED 10/10/22   Kuldeep Winters MD   traMADol (ULTRAM) 50 MG tablet Take 1 tablet by mouth Every 8 (Eight) Hours As Needed for Moderate Pain. 3/14/23   Alexsander Patel MD        Social History:   Social History     Tobacco Use   • Smoking status: Former     Packs/day: 0.25     Years: 30.00     Pack years: 7.50     Types: Cigarettes     Start date:      Quit date:      Years since quittin.2   • Smokeless tobacco: Former     Types: Chew   Vaping Use   • Vaping Use: Never used   Substance Use Topics   • Alcohol use: Not Currently     Comment: non-drinker   • Drug use: Never     Comment: none         Review of Systems:  Review of Systems   Constitutional: Negative for chills and fever.   HENT: Negative for congestion, ear pain and sore throat.    Eyes: Negative for pain.   Respiratory: Negative for cough, chest tightness and shortness of breath.    Cardiovascular: Negative for chest pain.   Gastrointestinal: Negative for abdominal pain, diarrhea, nausea and vomiting.   Genitourinary: Positive for decreased urine volume. Negative for difficulty urinating, dysuria, flank pain, frequency, hematuria and urgency.   Musculoskeletal:  "Negative for back pain, joint swelling, neck pain and neck stiffness.   Skin: Negative for pallor and rash.   Neurological: Negative for seizures and headaches.   All other systems reviewed and are negative.       Physical Exam:  /67 (BP Location: Right arm, Patient Position: Lying)   Pulse 78   Temp 98.8 °F (37.1 °C) (Oral)   Resp 20   Ht 175.3 cm (69\")   Wt 84.7 kg (186 lb 11.7 oz)   SpO2 97%   BMI 27.58 kg/m²     Physical Exam  Vitals and nursing note reviewed.   Constitutional:       General: He is not in acute distress.     Appearance: Normal appearance. He is not ill-appearing or toxic-appearing.   HENT:      Head: Normocephalic and atraumatic.   Eyes:      General: No scleral icterus.     Conjunctiva/sclera: Conjunctivae normal.      Pupils: Pupils are equal, round, and reactive to light.   Cardiovascular:      Rate and Rhythm: Normal rate and regular rhythm.      Pulses: Normal pulses.   Pulmonary:      Effort: Pulmonary effort is normal. No respiratory distress.   Abdominal:      General: Abdomen is flat. There is no distension.      Palpations: Abdomen is soft.      Tenderness: There is no abdominal tenderness. There is no guarding or rebound.   Musculoskeletal:         General: Normal range of motion.      Cervical back: Normal range of motion.      Right lower leg: No edema.      Left lower leg: No edema.   Skin:     General: Skin is warm and dry.      Capillary Refill: Capillary refill takes less than 2 seconds.      Coloration: Skin is not cyanotic.      Findings: No rash.   Neurological:      General: No focal deficit present.      Mental Status: He is alert and oriented to person, place, and time. Mental status is at baseline.   Psychiatric:         Attention and Perception: Attention and perception normal.         Mood and Affect: Mood normal.                  Procedures:  Procedures      Medical Decision Making:      Comorbidities that affect care:    GERD, nonrheumatic aortic " stenosis, anxiety, COPD,, Chronic Kidney Disease, Hypertension    External Notes reviewed:    Previous Clinic Note: [facility] concerning his renal failure      The following orders were placed and all results were independently analyzed by me:  Orders Placed This Encounter   Procedures   • Comprehensive Metabolic Panel   • [place] Draw   • CBC Auto Differential   • Phosphorus   • Magnesium   • PTH, Intact   • Urinalysis With Microscopic If Indicated (No Culture) - Urine, Clean Catch   • Urinalysis, Microscopic Only - Urine, Clean Catch   • Basic Metabolic Panel   • CBC (No Diff)   • Hemoglobin A1c   • Diet: Renal Diets; Low Sodium (2-3g), Low Potassium, Low Phosphorus; Texture: Regular Texture (IDDSI 7); Fluid Consistency: Thin (IDDSI 0)   • Vital Signs   • Intake & Output   • Weigh Patient   • Oral Care   • Saline Lock & Maintain IV Access   • Activity - Ad Shira   • Daily Weights   • Code Status and Medical Interventions:   • IP General Consult (Use specialty-specific consult if known)   • Inpatient Hospitalist Consult   • Inpatient Nephrology Consult   • Oxygen Therapy- Nasal Cannula; Titrate for SPO2: 90% - 95%   • Insert Peripheral IV   • Initiate Observation Status   • CBC & Differential   • Green Top (Gel)   • Lavender Top   • Gold Top - SST   • Light Blue Top       Medications Given in the Emergency Department:  Medications   sodium chloride 0.9 % flush 10 mL (has no administration in time range)   sodium chloride 0.9 % flush 10 mL (has no administration in time range)   sodium chloride 0.9 % infusion 40 mL (has no administration in time range)   heparin (porcine) 5000 UNIT/ML injection 5,000 Units (has no administration in time range)   lactated ringers infusion (125 mL/hr Intravenous New Bag 3/25/23 6651)   sodium chloride 0.9 % bolus 1,000 mL (0 mL Intravenous Stopped 3/25/23 8893)        ED Course:    The patient was initially evaluated in the triage area where orders were  placed. The patient was later dispositioned by HIRAM Brice.      The patient was advised to stay for completion of workup which includes but is not limited to communication of labs and radiological results, reassessment and plan. The patient was advised that leaving prior to disposition by a provider could result in critical findings that are not communicated to the patient.     ED Course as of 03/25/23 0736   Sat Mar 25, 2023   0111 I spoke with Dr. Matheus Ramirez with nephrology specialist.  He is not familiar with the patient.  I did review his recent lab work with him and ER visit notes with him.  He did recommend that the patient be admitted due to the acute kidney injury that he has had over the last week with his creatinine jumping from 3.5-4.5.  He states that he does need to be admitted and worked up for that.  He states that the patient refuses to be admitted then discharge him home and he can call the office on Monday but that he does recommend admission and for his son to bring his medications in for them to review and someone will see him tomorrow or Monday. [TC]   0137 Mr. Oneill cannot make his mind up further he will be admitted to the hospital for further evaluation and possible dialysis.  He wants me to talk to the nephrologist on-call and then he will determine what he wants to do. [TC]   0342 I spoke with Dr. Danielson and reviewed the case with him.  He will admit the patient and start some IV hydration and work him up for his elevated blood sugar.  The patient was made aware of the admission. [TC]      ED Course User Index  [TC] Kimberly Hoffman APRN       Labs:    Lab Results (last 24 hours)     Procedure Component Value Units Date/Time    CBC & Differential [[id number]]  (Abnormal) Collected: 03/24/23 2200    Specimen: Blood Updated: 03/24/23 2208    Narrative:      The following orders were created for panel order CBC & Differential.  Procedure                               Abnormality          Status                     ---------                               -----------         ------                     CBC Auto Differential[[id number]]        Abnormal            Final result                 Please view results for these tests on the individual orders.    Comprehensive Metabolic Panel [[id number]]  (Abnormal) Collected: 03/24/23 2200    Specimen: Blood Updated: 03/24/23 2228     Glucose 166 mg/dL      BUN 65 mg/dL      Creatinine 4.38 mg/dL      Sodium 138 mmol/L      Potassium 3.9 mmol/L      Chloride 99 mmol/L      CO2 25.6 mmol/L      Calcium 8.7 mg/dL      Total Protein 8.2 g/dL      Albumin 3.6 g/dL      ALT (SGPT) 66 U/L      AST (SGOT) 36 U/L      Alkaline Phosphatase 43 U/L      Total Bilirubin 0.2 mg/dL      Globulin 4.6 gm/dL      A/G Ratio 0.8 g/dL      BUN/Creatinine Ratio 14.8     Anion Gap 13.4 mmol/L      eGFR 14.3 mL/min/1.73      Comment: <15 Indicative of kidney failure       Narrative:      GFR Normal >60  Chronic Kidney Disease <60  Kidney Failure <15      CBC Auto Differential [[id number]]  (Abnormal) Collected: 03/24/23 2200    Specimen: Blood Updated: 03/24/23 2208     WBC 11.14 10*3/mm3      RBC 3.57 10*6/mm3      Hemoglobin 11.9 g/dL      Hematocrit 34.1 %      MCV 95.5 fL      MCH 33.3 pg      MCHC 34.9 g/dL      RDW 11.0 %      RDW-SD 39.0 fl      MPV 9.9 fL      Platelets 390 10*3/mm3      Neutrophil % 77.9 %      Lymphocyte % 11.6 %      Monocyte % 6.6 %      Eosinophil % 3.1 %      Basophil % 0.4 %      Immature Grans % 0.4 %      Neutrophils, Absolute 8.67 10*3/mm3      Lymphocytes, Absolute 1.29 10*3/mm3      Monocytes, Absolute 0.74 10*3/mm3      Eosinophils, Absolute 0.35 10*3/mm3      Basophils, Absolute 0.04 10*3/mm3      Immature Grans, Absolute 0.05 10*3/mm3      nRBC 0.0 /100 WBC     Phosphorus [[id number]]  (Normal) Collected: 03/24/23 2200    Specimen: Blood Updated: 03/25/23 0016     Phosphorus 4.3 mg/dL     Magnesium [[id number]]  (Normal) Collected: 03/24/23  2200    Specimen: Blood Updated: 03/25/23 0016     Magnesium 2.1 mg/dL     PTH, Intact [[id number]]  (Abnormal) Collected: 03/24/23 2200    Specimen: Blood Updated: 03/25/23 0034     PTH, Intact 132.5 pg/mL     Narrative:      Results may be falsely decreased if patient taking Biotin.      Urinalysis With Microscopic If Indicated (No Culture) - Urine, Clean Catch [[id number]]  (Abnormal) Collected: 03/25/23 0231    Specimen: Urine, Clean Catch Updated: 03/25/23 0243     Color, UA Yellow     Appearance, UA Cloudy     pH, UA <=5.0     Specific Gravity, UA 1.016     Glucose, UA Negative     Ketones, UA Negative     Bilirubin, UA Negative     Blood, UA Large (3+)     Protein,  mg/dL (2+)     Leuk Esterase, UA Trace     Nitrite, UA Negative     Urobilinogen, UA 0.2 E.U./dL    Urinalysis, Microscopic Only - Urine, Clean Catch [[id number]]  (Abnormal) Collected: 03/25/23 0231    Specimen: Urine, Clean Catch Updated: 03/25/23 0254     RBC, UA Too Numerous to Count /HPF      WBC, UA 6-12 /HPF      Bacteria, UA Trace /HPF      Squamous Epithelial Cells, UA 0-2 /HPF      Hyaline Casts, UA 0-2 /LPF      Methodology Manual Light Microscopy    Basic Metabolic Panel [[id number]]  (Abnormal) Collected: 03/25/23 0648    Specimen: Blood Updated: 03/25/23 0720     Glucose 104 mg/dL      BUN 64 mg/dL      Creatinine 3.95 mg/dL      Sodium 137 mmol/L      Potassium 3.4 mmol/L      Chloride 101 mmol/L      CO2 23.6 mmol/L      Calcium 8.2 mg/dL      BUN/Creatinine Ratio 16.2     Anion Gap 12.4 mmol/L      eGFR 16.2 mL/min/1.73     Narrative:      GFR Normal >60  Chronic Kidney Disease <60  Kidney Failure <15      CBC (No Diff) [[id number]]  (Abnormal) Collected: 03/25/23 0648    Specimen: Blood Updated: 03/25/23 0704     WBC 10.91 10*3/mm3      RBC 3.25 10*6/mm3      Hemoglobin 11.1 g/dL      Hematocrit 30.9 %      MCV 95.1 fL      MCH 34.2 pg      MCHC 35.9 g/dL      RDW 11.1 %      RDW-SD 38.6 fl      MPV 10.3 fL      Platelets 362  10*3/mm3     Hemoglobin A1c [[id number]] Collected: 03/25/23 0648    Specimen: Blood Updated: 03/25/23 0701           Imaging:    No Radiology Exams Resulted Within Past 24 Hours      Differential Diagnosis and Discussion:      Hematuria: Differential diagnosis includes but is not limited to medications, coagulopathy, glomerulonephritis, nephritis, neoplasm, vascular abnormalities, cystitis, urethritis, neoplasms of the bladder, and autoimmune disorders.    All labs were reviewed and interpreted by me.    MDM  Number of Diagnoses or Management Options  Acute kidney injury (HCC): established and worsening  Acute renal failure superimposed on stage 3a chronic kidney disease, unspecified acute renal failure type (HCC): established and worsening  Hyperglycemia: new and requires workup     Amount and/or Complexity of Data Reviewed  Clinical lab tests: reviewed  Decide to obtain previous medical records or to obtain history from someone other than the patient: yes    Risk of Complications, Morbidity, and/or Mortality  Presenting problems: moderate  Diagnostic procedures: moderate  Management options: moderate    Patient Progress  Patient progress: stable       Patient Care Considerations:    NARCOTICS: I considered prescribing opiate pain medication as an outpatient, however The patient did not require any pain medications.      Consultants/Shared Management Plan:    Hospitalist: I have discussed the case with Matheus Lopez MD who agrees to accept the patient for admission.  Consultant: I have discussed the case with OMAR LOPEZ MD who agrees to consult on the patient.    Social Determinants of Health:    Patient is independent, reliable, and has access to care.       Disposition and Care Coordination:    Admit:   Through independent evaluation of the patient's history, physical, and imperical data, the patient meets criteria for observation/admission to the hospital.      Final diagnoses:   Acute kidney injury (HCC)    Acute renal failure superimposed on stage 3a chronic kidney disease, unspecified acute renal failure type (HCC)   Hyperglycemia        ED Disposition     ED Disposition   Decision to Admit    Condition   --    Comment   Level of Care: Med/Surg [1]   Diagnosis: Acute kidney injury (HCC) [908993]   Admitting Physician: SAMUEL FLORES [[id number]]   Attending Physician: SAMUEL FLORES [[id number]]               This medical record created using voice recognition software.           Kimberly Hoffman, HIRAM  03/25/23 0736

## 2023-03-25 NOTE — CONSULTS
"  Referring Provider: recinos  Reason for Consultation: renal issues    Patient Care Team:  Kuldeep Winters MD as PCP - General (Family Medicine)    Chief complaint     Subjective .     History of present illness:  Chart reviewed  Sent to ER by Dr Watters for MEDARDO.  Outpatient labs showed worsening kidney function starting about 11 daays ago.  So far serologies are negative.  Patient overall is a poor historian.  He says Dr Buckley recently treated him for a UTI, records show it was Ceftin.  He denies any swelling or rashes.  Does report his urine has been \"dark\"  Ne does not use NSAIDS or aspirin    Review of Systems    General : No pain, no fevers/chills  HEENT: No headache, no nosebleed, no sore throat, no blurry vision  Chest : no chest pain, no palpitations, no chest wall pain  Respiratory: No cough, no SOA, no STARK, no hemoptysis, no orthopnea  GI:  No N/V/D, no abdominal pain, no BRBPR, no melena  Skin : no rashes, no pruritus  Musculoskeletal : + r  flank pain, no joint effusions  Neuro : + weakness, no numbness, no dizziness, no double vision  Endocrine: no heat/cold intolerance, no weight loss  Genitourinary: no dysuria or hematuria, no frequency  Psychiatric: no insomnia, no depression, no anxiety  Heme: no easy bruising or bleeding  Vascular: no cyanosis or extremity pain        Past Medical History:   Diagnosis Date   • Acute bronchitis, unspecified    • Acute upper respiratory infection, unspecified    • Anxiety disorder, unspecified    • Anxiety with depression    • Chronic kidney disease, stage III (moderate) ([facility])    • Colon polyp    • Cough    • Dizziness and giddiness    • Emphysema, unspecified ([facility])    • Fatigue    • GERD (gastroesophageal reflux disease)    • Heartburn    • Hypertension    • Mild intermittent acute bronchitis with asthma with acute exacerbation    • Nonrheumatic aortic (valve) stenosis    • Vision problem      Past Surgical History:   Procedure Laterality Date   • CIRCUMCISION  "    • COLONOSCOPY W/ BIOPSIES AND POLYPECTOMY     • ENDOSCOPY  2013   • HAND SURGERY Right     INJURY TO R HAND   • KNEE ARTHROSCOPY Left      Family History   Problem Relation Age of Onset   • Coronary artery disease Mother    • Pulmonary embolism Mother         POST OP   • Heart attack Brother    • Prostate cancer Brother    • COPD Brother    • Alcohol abuse Maternal Grandfather      Social History     Tobacco Use   • Smoking status: Former     Packs/day: 0.25     Years: 30.00     Pack years: 7.50     Types: Cigarettes     Start date:      Quit date:      Years since quittin.2   • Smokeless tobacco: Former     Types: Chew   Vaping Use   • Vaping Use: Never used   Substance Use Topics   • Alcohol use: Not Currently     Comment: non-drinker   • Drug use: Never     Comment: none     Medications Prior to Admission   Medication Sig Dispense Refill Last Dose   • amLODIPine (NORVASC) 10 MG tablet Take 1 tablet by mouth once daily 90 tablet 0 3/24/2023   • cefuroxime (CEFTIN) 500 MG tablet Take 1 tablet by mouth Daily. 7 tablet 0 3/24/2023   • doxazosin (CARDURA) 2 MG tablet TAKE 1 TABLET BY MOUTH ONCE DAILY AT NIGHT 90 tablet 1 Past Week   • losartan-hydrochlorothiazide (HYZAAR) 100-25 MG per tablet Take 1 tablet by mouth once daily 90 tablet 1 3/24/2023   • omeprazole (priLOSEC) 20 MG capsule TAKE 1 CAPSULE BY MOUTH ONCE DAILY AS NEEDED 90 capsule 1 3/24/2023   • traMADol (ULTRAM) 50 MG tablet Take 1 tablet by mouth Every 8 (Eight) Hours As Needed for Moderate Pain. 9 tablet 0 Past Week   • albuterol sulfate  (90 Base) MCG/ACT inhaler Inhale 2 puffs Every 4 (Four) Hours As Needed for Wheezing or Shortness of Air. 6.7 g 5    • albuterol sulfate  (90 Base) MCG/ACT inhaler Inhale 2 puffs Every 4 (Four) Hours As Needed for Wheezing. 8 g 0    • Budeson-Glycopyrrol-Formoterol (BREZTRI) 160-9-4.8 MCG/ACT aerosol inhaler Inhale 2 puffs 2 (Two) Times a Day. 10.7 g 0      heparin (porcine), 5,000  Units, Subcutaneous, Q12H  potassium chloride, 20 mEq, Oral, Once  sodium chloride, 10 mL, Intravenous, Q12H      lactated ringers, 125 mL/hr, Last Rate: 125 mL/hr (03/25/23 0641)      Allergies:   Benadryl [diphenhydramine]    Objective     Vital Signs   Temp:  [97.9 °F (36.6 °C)-98.8 °F (37.1 °C)] 98.8 °F (37.1 °C)  Heart Rate:  [71-90] 73  Resp:  [18-20] 18  BP: (115-137)/(62-82) 123/62      Intake/Output Summary (Last 24 hours) at 3/25/2023 1309  Last data filed at 3/25/2023 1118  Gross per 24 hour   Intake 1240 ml   Output 600 ml   Net 640 ml       Physical Exam:      General Appearance:    Alert,  in no acute distress   Head:    Normocephalic, without obvious abnormality, atraumatic   Eyes:          No drainage, no icterus, no pallor   Throat:   No oral lesions, no thrush, oral mucosa moist   Neck:   No adenopathy,  no thyromegaly,  no JVD   Musc:     No CVA tenderness to palpation, no joint effusions   Lungs:     Clear to auscultation bilaterally,no wheezes or rhonchi ,  unlabored    Heart:    Regular rhythm and normal rate, normal S1 and S2, +soft   murmur, no gallop, no rub   Chest Wall:    No chest wall tenderness to palpation   Abdomen:     Normal bowel sounds, no masses,  soft non-tender, non-distended, no guarding, no rebound    Rectal:     Deferred   Extremities:   No Lower extremity edema, no cyanosis   Pulses:   Radial Pulses palpable   Skin:   Dry, No bruising or rashes   Lymph nodes:   No palpable adenopathy in neck    Neurologic:   Cranial nerves 2 - 12 grossly intact, no gross motor deficits          Results Review:       Results from last 7 days   Lab Units 03/25/23  0648 03/24/23  2200 03/20/23  1427   SODIUM mmol/L 137 138 137   POTASSIUM mmol/L 3.4* 3.9 3.6   CHLORIDE mmol/L 101 99 98   CO2 mmol/L 23.6 25.6 24.4   BUN mg/dL 64* 65* 61*   CREATININE mg/dL 3.95* 4.38* 4.53*   GLUCOSE mg/dL 104* 166* 109*   CALCIUM mg/dL 8.2* 8.7 9.0     Results from last 7 days   Lab Units 03/25/23  0648  "03/24/23  2200 03/20/23  1427   WBC 10*3/mm3 10.91* 11.14* 12.03*   HEMOGLOBIN g/dL 11.1* 11.9* 11.8*   HEMATOCRIT % 30.9* 34.1* 34.8*   PLATELETS 10*3/mm3 362 390 277     Magnesium   Date Value Ref Range Status   03/24/2023 2.1 1.6 - 2.4 mg/dL Final     Phosphorus   Date Value Ref Range Status   03/24/2023 4.3 2.5 - 4.5 mg/dL Final     No results found for: BNP  Lab Results   Component Value Date    ALBUMIN 3.6 03/24/2023      Brief Urine Lab Results  (Last result in the past 365 days)      Color   Clarity   Blood   Leuk Est   Nitrite   Protein   CREAT   Urine HCG        03/25/23 0231 Yellow   Cloudy   Large (3+)   Trace   Negative   100 mg/dL (2+)                  No radiology results from the last 24 hrs        Assessment & Plan       Acute kidney injury (HCC)    1. MEDARDO - only complaint has been of weakness. peaked at 4.5 associated with active urine with blood and 610 mg of protein.  Has had neg cysto per his report and CT scan was negative for stones so concern is over a rapidly progressive GN.   So far NAZ, complements, and ANCA's are negative.  Will order Anti-GBM and hepatitis panel.   Hold Losartan /HCTZ.  A little better with IVF's but not clinically dehydrated.  I told him he needs a kidney biopsy Monday and he is not sure about it, says he will 'think about it\".  He has not been on ASA and will hold heparin for now and can use SCD's    2. CKD3a- previous U/S showed echogencity , from HTN and atherosclerotic disease. Baseline creatinine about 1.7 per Dr Watters's office notes. Mother had CKD as well, ? From HTN.    3. HTN- monitor, stay off losartan/HCTZ with MEDARDO    4. Microscopic hematuria with reported dark urine-  Concern is over a GN, however, it sounds like this may be going on for a long time as his \"old doctor\" ran dye \"up into his kidneys looking for stones\" years ago. He's also had a more recent cysto which he reports as negative    5. proteinuria- non-nephrotic-- hold ARB    6. GERD- change PPI to " pepcid with MEDARDO    7. COPD    8. H/O A.S.    9. Elevated WBC's- seems to coincide with MEDARDO 11 days ago, ? Cause    10. Recent UTI- CX neg, treated with Ceftin    Plan:  Serologies  Cont IVF's  Hold heparin for possible kidney biopsy Monday if patient will do it.  I explained that his kidneys could fail if we don't get a diagnosis and try treating the disease we find on biopsy.      Thank you for consult.   Please call [phone] for any questions.

## 2023-03-25 NOTE — ED TRIAGE NOTES
"Pt to ED from home with reports of having labs drawn Monday at nephrologist's office and today receiving phone call and being instructed to come to ED.      Pt dx with stage 3 kidney disease, not currently on dialysis.  Pt states he was told his kidneys are functioning \"at 34%\".  "

## 2023-03-25 NOTE — H&P
[facility] HISTORY AND PHYSICAL  Date: 3/25/2023   Patient Name: Chad Oneill  : 1960  MRN: [id number]  Primary Care Physician:  Kuldeep Winters MD  Date of admission: 3/25/2023    Subjective   Subjective     Chief Complaint: Abnormal labs    HPI:    Chad Oneill is a 63 y.o. male brought into the emergency department this evening for evaluation of abnormal labs.  Patient was contacted by his nephrologist office, recommended the patient come to the emergency department for evaluation of abnormal laboratory results.  Patient denies any acute symptoms, however, followed through with recommendations and presented this evening.  Patient states he has been making urine without any difficulty or urinary symptoms.  Patient has been having his creatinine monitored regularly by his PCP and nephrologist.  There has been some discussion of considering dialysis therapy, patient has been reluctant given his mother's history and subsequent expiration while on dialysis.      Personal History     Past Medical History:  Hypertension  GERD  Asthma  Aortic stenosis  Emphysema  Colon polyp  Stage III kidney disease  Anxiety    Past Surgical History:  Knee arthroscopy  Endoscopy  Colonoscopy    Family History:   CAD  Pulmonary emboli  Prostate cancer  COPD  Alcohol use disorder    Social History:   Patient denies any current alcohol or illicit drug use, former cigarette smoker, 30-pack-year smoking history.    Home Medications:  Budeson-Glycopyrrol-Formoterol, albuterol sulfate HFA, amLODIPine, cefuroxime, doxazosin, losartan-hydrochlorothiazide, omeprazole, and traMADol    Allergies:  Allergies   Allergen Reactions   • Benadryl [Diphenhydramine] Palpitations       Review of Systems   All systems were reviewed and negative except for: Abnormal labs    Objective   Objective     Vitals:   Temp:  [98.4 °F (36.9 °C)] 98.4 °F (36.9 °C)  Heart Rate:  [71-90] 74  Resp:  [18] 18  BP: (115-127)/(67-73)  121/68    Physical Exam    Constitutional: Awake, alert, no acute distress   Eyes: Pupils equal, sclerae anicteric, no conjunctival injection   HENT: NCAT, mucous membranes moist   Neck: Supple, no thyromegaly, no lymphadenopathy, trachea midline   Respiratory: Clear to auscultation bilaterally, nonlabored respirations    Cardiovascular: RRR, no murmurs, rubs, or gallops, palpable pedal pulses bilaterally   Gastrointestinal: Positive bowel sounds, soft, nontender, nondistended   Musculoskeletal: No bilateral ankle edema, no clubbing or cyanosis to extremities   Psychiatric: Appropriate affect, cooperative   Neurologic: Oriented x 3, strength symmetric in all extremities, Cranial Nerves grossly intact to confrontation, speech clear   Skin: No rashes     Result Review    Result Review:  I have personally reviewed the results from the time of this admission to 3/25/2023 04:18 EDT and agree with these findings:  [x]  Laboratory  []  Microbiology  [x]  Radiology  []  EKG/Telemetry   []  Cardiology/Vascular   []  Pathology  []  Old records  []  Other:      Assessment & Plan   Assessment / Plan     Assessment/Plan:   Acute kidney injury on CKD stage III-patient has had worsening kidney function over the last few weeks.  IV fluids were started in the emergency department, will continue continued on the medical floor.  Nephrology has been consulted, we appreciate the recommendation of the care of this patient.  Patient still reports making good urine on a daily basis.  CT scan was performed and shows no acute renal pathology.  Hyperglycemia-check an A1c, begin diabetic therapy if elevated.      DVT prophylaxis: Heparin    CODE STATUS:    Level Of Support Discussed With: Patient  Code Status (Patient has no pulse and is not breathing): CPR (Attempt to Resuscitate)  Medical Interventions (Patient has pulse or is breathing): Full Support      Admission Status:  I believe this patient meets observation status.    Electronically  signed by Ronaldo Danielson DO, 03/25/23, 4:18 AM EDT.

## 2023-03-25 NOTE — PLAN OF CARE
Goal Outcome Evaluation:  Plan of Care Reviewed With: patient        Progress: no change  Outcome Evaluation: Pt was A&Ox4 this shift. Also, pt remained on room air maintaining stable oxygen saturation. Pt denied pain this shift. Contious fluids were decreased to 75mL/hr,as per order. All other vital signs remained stable.

## 2023-03-26 LAB
ALBUMIN SERPL-MCNC: 3.1 G/DL (ref 3.5–5.2)
ALBUMIN/GLOB SERPL: 0.8 G/DL
ALP SERPL-CCNC: 38 U/L (ref 39–117)
ALT SERPL W P-5'-P-CCNC: 46 U/L (ref 1–41)
ANION GAP SERPL CALCULATED.3IONS-SCNC: 9.4 MMOL/L (ref 5–15)
AST SERPL-CCNC: 27 U/L (ref 1–40)
BASOPHILS # BLD AUTO: 0.03 10*3/MM3 (ref 0–0.2)
BASOPHILS NFR BLD AUTO: 0.3 % (ref 0–1.5)
BILIRUB SERPL-MCNC: 0.3 MG/DL (ref 0–1.2)
BUN SERPL-MCNC: 57 MG/DL (ref 8–23)
BUN/CREAT SERPL: 14.7 (ref 7–25)
CALCIUM SPEC-SCNC: 8.4 MG/DL (ref 8.6–10.5)
CHLORIDE SERPL-SCNC: 106 MMOL/L (ref 98–107)
CO2 SERPL-SCNC: 24.6 MMOL/L (ref 22–29)
CREAT SERPL-MCNC: 3.87 MG/DL (ref 0.76–1.27)
DEPRECATED RDW RBC AUTO: 37.8 FL (ref 37–54)
EGFRCR SERPLBLD CKD-EPI 2021: 16.6 ML/MIN/1.73
EOSINOPHIL # BLD AUTO: 0.36 10*3/MM3 (ref 0–0.4)
EOSINOPHIL NFR BLD AUTO: 3.8 % (ref 0.3–6.2)
ERYTHROCYTE [DISTWIDTH] IN BLOOD BY AUTOMATED COUNT: 10.9 % (ref 12.3–15.4)
GLOBULIN UR ELPH-MCNC: 4.1 GM/DL
GLUCOSE SERPL-MCNC: 102 MG/DL (ref 65–99)
HAV IGM SERPL QL IA: NORMAL
HBV CORE IGM SERPL QL IA: NORMAL
HBV SURFACE AG SERPL QL IA: NORMAL
HCT VFR BLD AUTO: 29.2 % (ref 37.5–51)
HCV AB SER DONR QL: NORMAL
HGB BLD-MCNC: 10.6 G/DL (ref 13–17.7)
IMM GRANULOCYTES # BLD AUTO: 0.11 10*3/MM3 (ref 0–0.05)
IMM GRANULOCYTES NFR BLD AUTO: 1.1 % (ref 0–0.5)
LYMPHOCYTES # BLD AUTO: 1.46 10*3/MM3 (ref 0.7–3.1)
LYMPHOCYTES NFR BLD AUTO: 15.2 % (ref 19.6–45.3)
MAGNESIUM SERPL-MCNC: 1.7 MG/DL (ref 1.6–2.4)
MCH RBC QN AUTO: 34.4 PG (ref 26.6–33)
MCHC RBC AUTO-ENTMCNC: 36.3 G/DL (ref 31.5–35.7)
MCV RBC AUTO: 94.8 FL (ref 79–97)
MONOCYTES # BLD AUTO: 0.71 10*3/MM3 (ref 0.1–0.9)
MONOCYTES NFR BLD AUTO: 7.4 % (ref 5–12)
NEUTROPHILS NFR BLD AUTO: 6.91 10*3/MM3 (ref 1.7–7)
NEUTROPHILS NFR BLD AUTO: 72.2 % (ref 42.7–76)
NRBC BLD AUTO-RTO: 0 /100 WBC (ref 0–0.2)
PLATELET # BLD AUTO: 356 10*3/MM3 (ref 140–450)
PMV BLD AUTO: 10.2 FL (ref 6–12)
POTASSIUM SERPL-SCNC: 4 MMOL/L (ref 3.5–5.2)
PROT SERPL-MCNC: 7.2 G/DL (ref 6–8.5)
RBC # BLD AUTO: 3.08 10*6/MM3 (ref 4.14–5.8)
SODIUM SERPL-SCNC: 140 MMOL/L (ref 136–145)
WBC NRBC COR # BLD: 9.58 10*3/MM3 (ref 3.4–10.8)

## 2023-03-26 PROCEDURE — 85025 COMPLETE CBC W/AUTO DIFF WBC: CPT | Performed by: INTERNAL MEDICINE

## 2023-03-26 PROCEDURE — 99232 SBSQ HOSP IP/OBS MODERATE 35: CPT | Performed by: INTERNAL MEDICINE

## 2023-03-26 PROCEDURE — 94799 UNLISTED PULMONARY SVC/PX: CPT

## 2023-03-26 PROCEDURE — 82784 ASSAY IGA/IGD/IGG/IGM EACH: CPT | Performed by: INTERNAL MEDICINE

## 2023-03-26 PROCEDURE — 83735 ASSAY OF MAGNESIUM: CPT | Performed by: INTERNAL MEDICINE

## 2023-03-26 PROCEDURE — 94760 N-INVAS EAR/PLS OXIMETRY 1: CPT

## 2023-03-26 PROCEDURE — 83516 IMMUNOASSAY NONANTIBODY: CPT | Performed by: INTERNAL MEDICINE

## 2023-03-26 PROCEDURE — 80053 COMPREHEN METABOLIC PANEL: CPT | Performed by: INTERNAL MEDICINE

## 2023-03-26 PROCEDURE — 86334 IMMUNOFIX E-PHORESIS SERUM: CPT | Performed by: INTERNAL MEDICINE

## 2023-03-26 PROCEDURE — 94664 DEMO&/EVAL PT USE INHALER: CPT

## 2023-03-26 PROCEDURE — 80074 ACUTE HEPATITIS PANEL: CPT | Performed by: INTERNAL MEDICINE

## 2023-03-26 RX ORDER — TERAZOSIN 1 MG/1
2 CAPSULE ORAL NIGHTLY
Status: DISCONTINUED | OUTPATIENT
Start: 2023-03-26 | End: 2023-03-28 | Stop reason: HOSPADM

## 2023-03-26 RX ADMIN — FAMOTIDINE 20 MG: 20 TABLET, FILM COATED ORAL at 08:44

## 2023-03-26 RX ADMIN — BUDESONIDE, GLYCOPYRROLATE, AND FORMOTEROL FUMARATE 2 PUFF: 160; 9; 4.8 AEROSOL, METERED RESPIRATORY (INHALATION) at 08:03

## 2023-03-26 RX ADMIN — SODIUM CHLORIDE, POTASSIUM CHLORIDE, SODIUM LACTATE AND CALCIUM CHLORIDE 20 ML/HR: 600; 310; 30; 20 INJECTION, SOLUTION INTRAVENOUS at 20:46

## 2023-03-26 RX ADMIN — TERAZOSIN HYDROCHLORIDE 2 MG: 1 CAPSULE ORAL at 20:31

## 2023-03-26 RX ADMIN — AMLODIPINE BESYLATE 10 MG: 5 TABLET ORAL at 08:44

## 2023-03-26 RX ADMIN — Medication 10 ML: at 08:44

## 2023-03-26 RX ADMIN — BUDESONIDE, GLYCOPYRROLATE, AND FORMOTEROL FUMARATE 2 PUFF: 160; 9; 4.8 AEROSOL, METERED RESPIRATORY (INHALATION) at 20:28

## 2023-03-26 RX ADMIN — SODIUM CHLORIDE, POTASSIUM CHLORIDE, SODIUM LACTATE AND CALCIUM CHLORIDE 75 ML/HR: 600; 310; 30; 20 INJECTION, SOLUTION INTRAVENOUS at 03:09

## 2023-03-26 NOTE — PROGRESS NOTES
LOS: 0 days   Patient Care Team:  Kuldeep Winters MD as PCP - General (Family Medicine)    Chief Complaint:  Renal issues    Subjective     Interval History:     Patient Complaints: Chart reviewed.  No new issues overnight  Feels well  No hematuria, thinks urine is more clear  No hemoptysis  No Cp/SOA  No rashes    !Patient Home Medications Stored in Pharmacy, , Does not apply, BID  !Patient Home Medications Stored on Unit, , Does not apply, BID  amLODIPine, 10 mg, Oral, Q24H  Budeson-Glycopyrrol-Formoterol, 2 puff, Inhalation, BID  famotidine, 20 mg, Oral, Daily  sodium chloride, 10 mL, Intravenous, Q12H  terazosin, 2 mg, Oral, Nightly      lactated ringers, 20 mL/hr, Last Rate: 20 mL/hr (03/26/23 1342)        Review of Systems:   General : No pain, no chills  HEENT: No headache, no nosebleed, no sore throat, no blurry vision  Chest : no chest pain, no palpitations  Respiratory: No cough, no SOA, no hemoptysis  GI:  No N/V/D, no abdominal pain  Skin : no rashes, no pruritus  Musculoskeletal : no flank pain, no joint effusions  Neuro : No weakness, no dizziness, no focal deficits  Endocrine: no heat/cold intolerance  Genitourinary: no dysuria or hematuria    Objective     Vital Signs  Temp:  [98.2 °F (36.8 °C)-99.3 °F (37.4 °C)] 98.2 °F (36.8 °C)  Heart Rate:  [71-82] 82  Resp:  [16-20] 16  BP: (123-144)/(59-79) 143/79    Intake/Output Summary (Last 24 hours) at 3/26/2023 1424  Last data filed at 3/26/2023 1342  Gross per 24 hour   Intake 3429.5 ml   Output --   Net 3429.5 ml           Physical Exam:     General Appearance:    Alert,  in no acute distress   Head:    Normocephalic, without obvious abnormality, atraumatic   Throat:    no thrush, oral mucosa moist   Neck:   No adenopathy, supple, no JVD   Musc:     No CVA tenderness to palpation   Lungs:     Clear to auscultation,respirations unlabored, no wheezes or rhonchi    Heart:    Regular rate and rhythm , normal S1 and S2, soft           murmur, no  gallop, no rub   Abdomen:     Normal bowel sounds, no masses, soft non-tender   Extremities:    No lower extremity edema, no cyanosis   :     No hematuria    Skin:   Dry, No  bruising or rash                  Results Review:    Results from last 7 days   Lab Units 03/26/23  0453 03/25/23  0648 03/24/23  2200   SODIUM mmol/L 140 137 138   POTASSIUM mmol/L 4.0 3.4* 3.9   CHLORIDE mmol/L 106 101 99   CO2 mmol/L 24.6 23.6 25.6   BUN mg/dL 57* 64* 65*   CREATININE mg/dL 3.87* 3.95* 4.38*   GLUCOSE mg/dL 102* 104* 166*   CALCIUM mg/dL 8.4* 8.2* 8.7     Results from last 7 days   Lab Units 03/26/23  0453 03/25/23  0648 03/24/23  2200   WBC 10*3/mm3 9.58 10.91* 11.14*   HEMOGLOBIN g/dL 10.6* 11.1* 11.9*   HEMATOCRIT % 29.2* 30.9* 34.1*   PLATELETS 10*3/mm3 356 362 390     Magnesium   Date Value Ref Range Status   03/26/2023 1.7 1.6 - 2.4 mg/dL Final   03/24/2023 2.1 1.6 - 2.4 mg/dL Final     Phosphorus   Date Value Ref Range Status   03/24/2023 4.3 2.5 - 4.5 mg/dL Final     No results found for: BNP  Lab Results   Component Value Date    ALBUMIN 3.1 (L) 03/26/2023      Brief Urine Lab Results  (Last result in the past 365 days)      Color   Clarity   Blood   Leuk Est   Nitrite   Protein   CREAT   Urine HCG        03/25/23 0231 Yellow   Cloudy   Large (3+)   Trace   Negative   100 mg/dL (2+)                  No radiology results from the last 24 hrs         Assessment & Plan       Acute kidney injury (HCC)    1. MEDARDO - only complaint has been of weakness. peaked at 4.5 associated with active urine with blood and 610 mg of protein.  Has had neg cysto per his report and CT scan was negative for stones so concern is over a rapidly progressive GN.   So far NAZ, complements, Hep panel, and ANCA's are negative.  Will order Anti-GBM . Hold Losartan /HCTZ.  A little better with IVF's but not clinically dehydrated.  I told him he needs a kidney biopsy Monday and he is not sure about it, but now says he will do it.   He has not been  "on ASA and will hold heparin for now and can use SCD's. Check INR in am     2. CKD3a- previous U/S showed echogencity , from HTN and atherosclerotic disease. Baseline creatinine about 1.7 per Dr Watters's office notes. Mother had CKD as well, ? From HTN.  Wonder if he has an IgA nephropathy that is more active, this issue with blood in his urine seems to have been going on a long time according to him.      3. HTN- monitor, stay off losartan/HCTZ with MEDARDO. Add back doxazosin.     4. Microscopic hematuria with reported dark urine-  Concern is over a GN, however, it sounds like this may be going on for a long time as his \"old doctor\" ran dye \"up into his kidneys looking for stones\" years ago. He's also had a more recent cysto which he reports as negative     5. proteinuria- non-nephrotic-- hold ARB     6. GERD- change PPI to pepcid with MEDARDO     7. COPD     8. H/O A.S.     9. Elevated WBC's- seems to coincide with MEDARDO 11 days ago, ? Cause     10. Recent UTI- CX neg, treated with Ceftin     Plan:  Serologies  KVO  IVF's  NPO after midnight,  kidney biopsy Monday if patient will do it.  I explained that his kidneys could fail if we don't get a diagnosis and try treating the disease we find on biopsy, he is agreeable now                   Please call if any questions  [phone]   "

## 2023-03-26 NOTE — PROGRESS NOTES
[facility]   Hospitalist Progress Note  Date: 3/26/2023  Patient Name: Chad Oneill  : 1960  MRN: [id number]  Date of admission: 3/25/2023      Subjective   Subjective     Chief Complaint:   Lab abnormalities    Summary:   Chad Oneill is a 63 y.o. male brought into the emergency department for evaluation of abnormal labs.  Patient was contacted by his nephrologist office, recommended the patient come to the emergency department for evaluation of abnormal laboratory results.  Patient denies any acute symptoms, however, followed through with recommendations and presented to the ED.  Patient states he has been making urine without any difficulty or urinary symptoms.  Patient has been having his creatinine monitored regularly by his PCP and nephrologist.  There has been some discussion of considering dialysis therapy, patient has been reluctant given his mother's history and subsequent expiration while on dialysis.  Additional serologies have been ordered by nephrologist.  Possible biopsy on Monday     Interval Followup:   Patient still hesitant about a renal biopsy.  Patient has odd understanding of current situation.  Patient believes if he leaves the hospital and starts a better diet all of this would clear up.  Patient has a family history of end-stage renal disease requiring dialysis.  Patient is hesitant to have the biopsy however not clear or very justifiable rationale.  We will continue to talk with patient about the importance of this biopsy.  Patient is stated he would not want dialysis    Review of Systems   All systems were reviewed and negative except for: No complaints today, feels at his baseline    Objective   Objective     Vitals:   Temp:  [98.4 °F (36.9 °C)-99.3 °F (37.4 °C)] 98.8 °F (37.1 °C)  Heart Rate:  [71-82] 71  Resp:  [18-20] 18  BP: (123-144)/(59-70) 132/64  Physical Exam    Constitutional: Awake, alert, no acute distress   Eyes: Pupils equal, sclerae anicteric, no  conjunctival injection   HENT: NCAT, mucous membranes moist   Neck: Supple, no thyromegaly, no lymphadenopathy, trachea midline   Respiratory: Clear to auscultation bilaterally, nonlabored respirations    Cardiovascular: RRR, no murmurs, rubs, or gallops, palpable pedal pulses bilaterally   Gastrointestinal: Positive bowel sounds, soft, nontender, nondistended   Musculoskeletal: No bilateral ankle edema, no clubbing or cyanosis to extremities   Psychiatric: Appropriate affect, cooperative   Neurologic: Oriented x 3, strength symmetric in all extremities, Cranial Nerves grossly intact to confrontation, speech clear   Skin: No rashes     Result Review    Result Review:  I have personally reviewed the results from 3/26/2023 and agree with these findings:  []  Laboratory  []  Microbiology  []  Radiology  []  EKG/Telemetry   []  Cardiology/Vascular   []  Pathology  []  Old records  []  Other:    Assessment & Plan   Assessment / Plan     Assessment/Plan:  MEDARDO  CKD stage IIIa  Hypertension  Microscopic hematuria  Proteinuria  GERD  COPD  Recently treated urinary tract infection    Plan:  Patient remains admitted to hospital for further care and management  Nephrology consulted, appreciate assistance  Patient with rapidly progressive rising creatinine, additional serologies have been ordered by nephrologist  Patient likely to have biopsy on Monday.  Patient is hesitant but will continue to work on getting patient on the importance of this biopsy  Holding anticoagulation for possible renal biopsy Monday  Continuing IV fluids   Urine clearing     Discussed plan with RN, nephrologist    DVT prophylaxis:  Mechanical DVT prophylaxis orders are present.    CODE STATUS:   Level Of Support Discussed With: Patient  Code Status (Patient has no pulse and is not breathing): CPR (Attempt to Resuscitate)  Medical Interventions (Patient has pulse or is breathing): Full Support

## 2023-03-26 NOTE — PLAN OF CARE
Problem: Adult Inpatient Plan of Care  Goal: Plan of Care Review  Outcome: Ongoing, Progressing  Flowsheets (Taken 3/26/2023 0320)  Plan of Care Reviewed With: patient  Goal: Patient-Specific Goal (Individualized)  Outcome: Ongoing, Progressing  Goal: Absence of Hospital-Acquired Illness or Injury  Outcome: Ongoing, Progressing  Intervention: Identify and Manage Fall Risk  Recent Flowsheet Documentation  Taken 3/26/2023 0200 by Monik Fitzpatrick LPN  Safety Promotion/Fall Prevention: safety round/check completed  Taken 3/26/2023 0000 by Monik Fitzpatrick LPN  Safety Promotion/Fall Prevention: safety round/check completed  Taken 3/25/2023 2208 by Monik Fitzpatrick LPN  Safety Promotion/Fall Prevention: safety round/check completed  Taken 3/25/2023 2010 by Monik Fitzpatrick LPN  Safety Promotion/Fall Prevention: safety round/check completed  Intervention: Prevent Skin Injury  Recent Flowsheet Documentation  Taken 3/26/2023 0000 by Monik Fitzpatrick LPN  Body Position: position changed independently  Taken 3/25/2023 2208 by Monik Fitzpatrick LPN  Body Position: position changed independently  Taken 3/25/2023 2010 by Monik Fitzpatrick LPN  Body Position: position changed independently  Intervention: Prevent and Manage VTE (Venous Thromboembolism) Risk  Recent Flowsheet Documentation  Taken 3/25/2023 2010 by Monik Fitzpatrick LPN  Activity Management: activity adjusted per tolerance  VTE Prevention/Management: patient refused intervention  Range of Motion: active ROM (range of motion) encouraged  Intervention: Prevent Infection  Recent Flowsheet Documentation  Taken 3/25/2023 2010 by Monik Fitzpatrick LPN  Infection Prevention:   rest/sleep promoted   single patient room provided  Goal: Optimal Comfort and Wellbeing  Outcome: Ongoing, Progressing  Intervention: Monitor Pain and Promote Comfort  Recent Flowsheet Documentation  Taken 3/25/2023 2010 by Monik Fitzpatrick LPN  Pain Management Interventions: see MAR  Intervention:  Provide Person-Centered Care  Recent Flowsheet Documentation  Taken 3/25/2023 2010 by Monik Fitzpatrick LPN  Trust Relationship/Rapport:   care explained   choices provided  Goal: Readiness for Transition of Care  Outcome: Ongoing, Progressing     Problem: Hypertension Comorbidity  Goal: Blood Pressure in Desired Range  Outcome: Ongoing, Progressing  Intervention: Maintain Blood Pressure Management  Recent Flowsheet Documentation  Taken 3/25/2023 2010 by Monik Fitzpatrick LPN  Medication Review/Management: medications reviewed   Goal Outcome Evaluation:  Plan of Care Reviewed With: patient      VSS< no c/o pain, pt stable throughout the night. Will continue current POC, pt resting in bed, is able to make needs known

## 2023-03-26 NOTE — PLAN OF CARE
Goal Outcome Evaluation:  Plan of Care Reviewed With: patient        Progress: no change  Outcome Evaluation: Pt remained A&Ox4 this shift. Also, pt remained on room air maintaining stable oxygen saturation. Pt denied pain this shift. Continous fluids were decreased to 20mL/hr, as per order. Informed consent for biopsy schedulued for tomorrow. Consent placed in patient's chart. All other vital signs remained stable.

## 2023-03-27 ENCOUNTER — APPOINTMENT (OUTPATIENT)
Dept: CT IMAGING | Facility: HOSPITAL | Age: 63
DRG: 684 | End: 2023-03-27
Payer: MEDICARE

## 2023-03-27 LAB
ANION GAP SERPL CALCULATED.3IONS-SCNC: 11.9 MMOL/L (ref 5–15)
APTT PPP: 34.9 SECONDS (ref 24.2–34.2)
BASOPHILS # BLD AUTO: 0.03 10*3/MM3 (ref 0–0.2)
BASOPHILS NFR BLD AUTO: 0.3 % (ref 0–1.5)
BUN SERPL-MCNC: 54 MG/DL (ref 8–23)
BUN/CREAT SERPL: 15.3 (ref 7–25)
CALCIUM SPEC-SCNC: 8.6 MG/DL (ref 8.6–10.5)
CHLORIDE SERPL-SCNC: 104 MMOL/L (ref 98–107)
CO2 SERPL-SCNC: 26.1 MMOL/L (ref 22–29)
CREAT SERPL-MCNC: 3.54 MG/DL (ref 0.76–1.27)
DEPRECATED RDW RBC AUTO: 38.6 FL (ref 37–54)
EGFRCR SERPLBLD CKD-EPI 2021: 18.4 ML/MIN/1.73
EOSINOPHIL # BLD AUTO: 0.35 10*3/MM3 (ref 0–0.4)
EOSINOPHIL NFR BLD AUTO: 3.6 % (ref 0.3–6.2)
ERYTHROCYTE [DISTWIDTH] IN BLOOD BY AUTOMATED COUNT: 11 % (ref 12.3–15.4)
GLUCOSE SERPL-MCNC: 93 MG/DL (ref 65–99)
HCT VFR BLD AUTO: 28.3 % (ref 37.5–51)
HGB BLD-MCNC: 10.4 G/DL (ref 13–17.7)
IMM GRANULOCYTES # BLD AUTO: 0.03 10*3/MM3 (ref 0–0.05)
IMM GRANULOCYTES NFR BLD AUTO: 0.3 % (ref 0–0.5)
INR PPP: 1.24 (ref 0.86–1.15)
LYMPHOCYTES # BLD AUTO: 1.51 10*3/MM3 (ref 0.7–3.1)
LYMPHOCYTES NFR BLD AUTO: 15.6 % (ref 19.6–45.3)
MAGNESIUM SERPL-MCNC: 1.6 MG/DL (ref 1.6–2.4)
MCH RBC QN AUTO: 35 PG (ref 26.6–33)
MCHC RBC AUTO-ENTMCNC: 36.7 G/DL (ref 31.5–35.7)
MCV RBC AUTO: 95.3 FL (ref 79–97)
MONOCYTES # BLD AUTO: 0.77 10*3/MM3 (ref 0.1–0.9)
MONOCYTES NFR BLD AUTO: 7.9 % (ref 5–12)
NEUTROPHILS NFR BLD AUTO: 7.01 10*3/MM3 (ref 1.7–7)
NEUTROPHILS NFR BLD AUTO: 72.3 % (ref 42.7–76)
NRBC BLD AUTO-RTO: 0 /100 WBC (ref 0–0.2)
PLATELET # BLD AUTO: 335 10*3/MM3 (ref 140–450)
PMV BLD AUTO: 10.2 FL (ref 6–12)
POTASSIUM SERPL-SCNC: 4 MMOL/L (ref 3.5–5.2)
PROTHROMBIN TIME: 15.6 SECONDS (ref 11.8–14.9)
RBC # BLD AUTO: 2.97 10*6/MM3 (ref 4.14–5.8)
SODIUM SERPL-SCNC: 142 MMOL/L (ref 136–145)
WBC NRBC COR # BLD: 9.7 10*3/MM3 (ref 3.4–10.8)

## 2023-03-27 PROCEDURE — 85025 COMPLETE CBC W/AUTO DIFF WBC: CPT | Performed by: INTERNAL MEDICINE

## 2023-03-27 PROCEDURE — 88350 IMFLUOR EA ADDL 1ANTB STN PX: CPT

## 2023-03-27 PROCEDURE — 80048 BASIC METABOLIC PNL TOTAL CA: CPT | Performed by: INTERNAL MEDICINE

## 2023-03-27 PROCEDURE — 85730 THROMBOPLASTIN TIME PARTIAL: CPT | Performed by: INTERNAL MEDICINE

## 2023-03-27 PROCEDURE — 25010000002 MIDAZOLAM PER 1MG: Performed by: RADIOLOGY

## 2023-03-27 PROCEDURE — 86335 IMMUNFIX E-PHORSIS/URINE/CSF: CPT | Performed by: INTERNAL MEDICINE

## 2023-03-27 PROCEDURE — 88348 ELECTRON MICROSCOPY DX: CPT

## 2023-03-27 PROCEDURE — 88305 TISSUE EXAM BY PATHOLOGIST: CPT

## 2023-03-27 PROCEDURE — 88300 SURGICAL PATH GROSS: CPT | Performed by: INTERNAL MEDICINE

## 2023-03-27 PROCEDURE — 88346 IMFLUOR 1ST 1ANTB STAIN PX: CPT

## 2023-03-27 PROCEDURE — 94799 UNLISTED PULMONARY SVC/PX: CPT

## 2023-03-27 PROCEDURE — 77012 CT SCAN FOR NEEDLE BIOPSY: CPT

## 2023-03-27 PROCEDURE — 25010000002 FENTANYL CITRATE (PF) 50 MCG/ML SOLUTION: Performed by: RADIOLOGY

## 2023-03-27 PROCEDURE — 83735 ASSAY OF MAGNESIUM: CPT | Performed by: INTERNAL MEDICINE

## 2023-03-27 PROCEDURE — 99232 SBSQ HOSP IP/OBS MODERATE 35: CPT | Performed by: INTERNAL MEDICINE

## 2023-03-27 PROCEDURE — 85610 PROTHROMBIN TIME: CPT | Performed by: INTERNAL MEDICINE

## 2023-03-27 PROCEDURE — 88313 SPECIAL STAINS GROUP 2: CPT

## 2023-03-27 PROCEDURE — 94761 N-INVAS EAR/PLS OXIMETRY MLT: CPT

## 2023-03-27 RX ORDER — LIDOCAINE HYDROCHLORIDE 20 MG/ML
INJECTION, SOLUTION INFILTRATION; PERINEURAL
Status: DISPENSED
Start: 2023-03-27 | End: 2023-03-28

## 2023-03-27 RX ORDER — ACETAMINOPHEN 500 MG
1000 TABLET ORAL EVERY 4 HOURS PRN
Status: DISCONTINUED | OUTPATIENT
Start: 2023-03-27 | End: 2023-03-28 | Stop reason: HOSPADM

## 2023-03-27 RX ORDER — MIDAZOLAM HYDROCHLORIDE 2 MG/2ML
INJECTION, SOLUTION INTRAMUSCULAR; INTRAVENOUS AS NEEDED
Status: COMPLETED | OUTPATIENT
Start: 2023-03-27 | End: 2023-03-27

## 2023-03-27 RX ORDER — FENTANYL CITRATE 50 UG/ML
INJECTION, SOLUTION INTRAMUSCULAR; INTRAVENOUS AS NEEDED
Status: COMPLETED | OUTPATIENT
Start: 2023-03-27 | End: 2023-03-27

## 2023-03-27 RX ADMIN — ACETAMINOPHEN 1000 MG: 500 TABLET ORAL at 17:11

## 2023-03-27 RX ADMIN — FENTANYL CITRATE 25 MCG: 50 INJECTION, SOLUTION INTRAMUSCULAR; INTRAVENOUS at 15:11

## 2023-03-27 RX ADMIN — Medication 10 ML: at 20:54

## 2023-03-27 RX ADMIN — FENTANYL CITRATE 50 MCG: 50 INJECTION, SOLUTION INTRAMUSCULAR; INTRAVENOUS at 14:55

## 2023-03-27 RX ADMIN — ACETAMINOPHEN 1000 MG: 500 TABLET ORAL at 23:05

## 2023-03-27 RX ADMIN — TERAZOSIN HYDROCHLORIDE 2 MG: 1 CAPSULE ORAL at 20:54

## 2023-03-27 RX ADMIN — MIDAZOLAM HYDROCHLORIDE 0.5 MG: 1 INJECTION, SOLUTION INTRAMUSCULAR; INTRAVENOUS at 15:10

## 2023-03-27 RX ADMIN — BUDESONIDE, GLYCOPYRROLATE, AND FORMOTEROL FUMARATE 2 PUFF: 160; 9; 4.8 AEROSOL, METERED RESPIRATORY (INHALATION) at 20:47

## 2023-03-27 RX ADMIN — AMLODIPINE BESYLATE 10 MG: 5 TABLET ORAL at 16:13

## 2023-03-27 RX ADMIN — MIDAZOLAM HYDROCHLORIDE 1 MG: 1 INJECTION, SOLUTION INTRAMUSCULAR; INTRAVENOUS at 14:52

## 2023-03-27 RX ADMIN — FAMOTIDINE 20 MG: 20 TABLET, FILM COATED ORAL at 16:13

## 2023-03-27 NOTE — PLAN OF CARE
Goal Outcome Evaluation:           Progress: no change  Outcome Evaluation: Patient a&ox4. NPO at midnight. Patient expected to recieve renal biopsy today. Patient expressed concern about personal belongings being unattened in room while he's away at procedure; offered to patient to place belongings in patient safe in room or to place with security- patient wishes to keep belongings in safe in room (wallet and watch). No complaints of pain this shift.

## 2023-03-27 NOTE — PROGRESS NOTES
[facility]   Hospitalist Progress Note  Date: 3/27/2023  Patient Name: Chad Oneill  : 1960  MRN: [id number]  Date of admission: 3/25/2023      Subjective   Subjective     Chief Complaint:   Lab abnormalities    Summary:   Chad Oneill is a 63 y.o. male brought into the emergency department for evaluation of abnormal labs.  Patient was contacted by his nephrologist office, recommended the patient come to the emergency department for evaluation of abnormal laboratory results.  Patient denies any acute symptoms, however, followed through with recommendations and presented to the ED.  Patient states he has been making urine without any difficulty or urinary symptoms.  Patient has been having his creatinine monitored regularly by his PCP and nephrologist.  There has been some discussion of considering dialysis therapy, patient has been reluctant given his mother's history and subsequent expiration while on dialysis.  Additional serologies have been ordered by nephrologist.  Plan for kidney biopsy Monday     Interval Followup:   Confirmed with radiology today, patient is on the schedule for renal biopsy to be performed.  Patient with no acute issues overnight, no complaints this morning.    Review of Systems   All systems were reviewed and negative except for: No complaints today, feels at his baseline    Objective   Objective     Vitals:   Temp:  [98.8 °F (37.1 °C)-99.3 °F (37.4 °C)] 98.8 °F (37.1 °C)  Heart Rate:  [74-80] 75  Resp:  [16-18] 18  BP: (124-144)/(66-74) 142/71  Physical Exam    Constitutional: Awake, alert, no acute distress   Eyes: Pupils equal, sclerae anicteric, no conjunctival injection   HENT: NCAT, mucous membranes moist   Neck: Supple, no thyromegaly, no lymphadenopathy, trachea midline   Respiratory: Clear to auscultation bilaterally, nonlabored respirations    Cardiovascular: RRR, no murmurs, rubs, or gallops, palpable pedal pulses bilaterally   Gastrointestinal: Positive bowel  sounds, soft, nontender, nondistended   Musculoskeletal: No bilateral ankle edema, no clubbing or cyanosis to extremities   Psychiatric: Appropriate affect, cooperative   Neurologic: Oriented x 3, strength symmetric in all extremities, Cranial Nerves grossly intact to confrontation, speech clear   Skin: No rashes     Result Review    Result Review:  I have personally reviewed the results from 3/27/2023 and agree with these findings:  []  Laboratory  []  Microbiology  []  Radiology  []  EKG/Telemetry   []  Cardiology/Vascular   []  Pathology  []  Old records  []  Other:    Assessment & Plan   Assessment / Plan     Assessment/Plan:  MEDARDO  CKD stage IIIa  Hypertension  Microscopic hematuria  Proteinuria  GERD  COPD  Recently treated urinary tract infection    Plan:  Patient remains admitted to hospital for further care and management  Nephrology consulted, appreciate assistance  Patient with rapidly progressive rising creatinine, additional serologies have been ordered by nephrologist  Biopsy to be performed today with interventional radiology  Holding anticoagulation for possible renal biopsy today  Continuing IV fluids   Urine clearing in color     Discussed plan with RN, nephrologist    DVT prophylaxis:  Mechanical DVT prophylaxis orders are present.    CODE STATUS:   Level Of Support Discussed With: Patient  Code Status (Patient has no pulse and is not breathing): CPR (Attempt to Resuscitate)  Medical Interventions (Patient has pulse or is breathing): Full Support

## 2023-03-27 NOTE — PROGRESS NOTES
[facility]     Nephrology Progress Note      Patient Name: Chad Oneill  : 1960  MRN: [id number]  Primary Care Physician:  Kuldeep Winters MD  Date of admission: 3/25/2023    Subjective   Subjective     Interval History:  Patient Reports feeling okay.  Just back in his room from CT-guided renal biopsy.  Having some mild back pain.  No shortness of breath or chest pain.  No fevers or chills.  He was n.p.o. up until now.  Asking about going home.    Review of Systems   All systems were reviewed and negative except for: What is mentioned above.    Objective   Objective     Vitals:   Temp:  [98.6 °F (37 °C)-99.3 °F (37.4 °C)] 98.6 °F (37 °C)  Heart Rate:  [74-97] 84  Resp:  [12-21] 18  BP: (118-150)/(71-98) 127/87  Physical Exam:   Constitutional: Awake, alert, not in distress.   Eyes: sclerae anicteric, no conjunctival injection   HENT: mucous membranes moist, edentulous.   Neck: Supple, no thyromegaly, no lymphadenopathy, trachea midline, No JVD   Respiratory: Clear to auscultation bilaterally, nonlabored respirations    Cardiovascular: RRR, no murmurs, rubs, or gallops.   Gastrointestinal: Positive bowel sounds, soft, nontender, nondistended, some suprapubic fullness.   Musculoskeletal: No edema, no clubbing or cyanosis   Psychiatric: Appropriate affect, cooperative   Neurologic: Oriented x 3, moving all extremities, Cranial Nerves grossly intact, speech clear   Skin: warm and dry, no rashes     Result Review    Result Reviewed:  I have personally reviewed the results from the time of this admission to 3/27/2023 16:35 EDT and agree with these findings:  [x]  Laboratory  []  Microbiology  [x]  Radiology  []  EKG/Telemetry   []  Cardiology/Vascular   []  Pathology  [x]  Old records  []  Other:  Lab Results   Component Value Date    GLUCOSE 93 2023    CALCIUM 8.6 2023     2023    K 4.0 2023    CO2 26.1 2023     2023    BUN 54 (H) 2023     CREATININE 3.54 (H) 03/27/2023    EGFRIFNONA 43 (L) 09/20/2021    BCR 15.3 03/27/2023    ANIONGAP 11.9 03/27/2023     Lab Results   Component Value Date    CALCIUM 8.6 03/27/2023    PHOS 4.3 03/24/2023      Results from last 7 days   Lab Units 03/27/23  0421   MAGNESIUM mg/dL 1.6          Most notable findings include: Creatinine 3.5, normal electrolytes, hemoglobin 10.4.  Urinalysis with active sediment and proteinuria, complement C3 and C4 within normal limit, ANCA, NAZ, dsDNA within normal limit.  Recent /178.  Hepatitis panel negative    Assessment & Plan   Assessment / Plan       Active Hospital Problems:  Active Hospital Problems    Diagnosis    • **Acute kidney injury (HCC)        Assessment and Plan:    - Acute kidney injury on CKD stage III, exact etiology is uncertain, possible glomerulonephritis, serology and immunology negative so far, anti-GBM is pending.  Status post CT-guided renal biopsy earlier today, well-tolerated, no complications so far.  Urological work-up was negative.  Losartan/HCTZ on hold.  Off IV fluid for now.  - CKD stage III, active urine sediment and subnephrotic range proteinuria, etiology is uncertain, has a history of hypertension.  Status postbiopsy as above.  Previous creatinine baseline around 1.8.?  IgA nephropathy.  - Suprapubic fullness, etiology uncertain, check postvoid bladder scan x1.  Discussed with nursing.  - Hypertension, blood pressure is acceptable.  - Proteinuria, subnephrotic, holding ARB as above.  Check serum and urine immunofixation.  - GERD, off PPI, on H2 blockers.  - Recent UTI, treated empirically.  Will follow.      Electronically signed by Tyrel Pabon MD, 03/27/23, 4:35 PM EDT.

## 2023-03-28 ENCOUNTER — READMISSION MANAGEMENT (OUTPATIENT)
Dept: CALL CENTER | Facility: HOSPITAL | Age: 63
End: 2023-03-28
Payer: MEDICARE

## 2023-03-28 ENCOUNTER — TELEPHONE (OUTPATIENT)
Dept: FAMILY MEDICINE CLINIC | Age: 63
End: 2023-03-28
Payer: MEDICARE

## 2023-03-28 VITALS
HEIGHT: 69 IN | HEART RATE: 84 BPM | RESPIRATION RATE: 20 BRPM | DIASTOLIC BLOOD PRESSURE: 74 MMHG | BODY MASS INDEX: 27.4 KG/M2 | SYSTOLIC BLOOD PRESSURE: 137 MMHG | WEIGHT: 184.97 LBS | TEMPERATURE: 98.6 F | OXYGEN SATURATION: 97 %

## 2023-03-28 LAB
ANION GAP SERPL CALCULATED.3IONS-SCNC: 12.1 MMOL/L (ref 5–15)
BUN SERPL-MCNC: 59 MG/DL (ref 8–23)
BUN/CREAT SERPL: 15.1 (ref 7–25)
CALCIUM SPEC-SCNC: 8.6 MG/DL (ref 8.6–10.5)
CHLORIDE SERPL-SCNC: 102 MMOL/L (ref 98–107)
CO2 SERPL-SCNC: 25.9 MMOL/L (ref 22–29)
CREAT SERPL-MCNC: 3.9 MG/DL (ref 0.76–1.27)
DEPRECATED RDW RBC AUTO: 39.1 FL (ref 37–54)
EGFRCR SERPLBLD CKD-EPI 2021: 16.4 ML/MIN/1.73
ERYTHROCYTE [DISTWIDTH] IN BLOOD BY AUTOMATED COUNT: 11 % (ref 12.3–15.4)
GBM AB SER IA-ACNC: <0.2 UNITS (ref 0–0.9)
GLUCOSE SERPL-MCNC: 96 MG/DL (ref 65–99)
HCT VFR BLD AUTO: 28 % (ref 37.5–51)
HGB BLD-MCNC: 9.9 G/DL (ref 13–17.7)
MAGNESIUM SERPL-MCNC: 1.6 MG/DL (ref 1.6–2.4)
MCH RBC QN AUTO: 34 PG (ref 26.6–33)
MCHC RBC AUTO-ENTMCNC: 35.4 G/DL (ref 31.5–35.7)
MCV RBC AUTO: 96.2 FL (ref 79–97)
PHOSPHATE SERPL-MCNC: 3.9 MG/DL (ref 2.5–4.5)
PLATELET # BLD AUTO: 336 10*3/MM3 (ref 140–450)
PMV BLD AUTO: 10.2 FL (ref 6–12)
POTASSIUM SERPL-SCNC: 4 MMOL/L (ref 3.5–5.2)
RBC # BLD AUTO: 2.91 10*6/MM3 (ref 4.14–5.8)
SODIUM SERPL-SCNC: 140 MMOL/L (ref 136–145)
WBC NRBC COR # BLD: 11.94 10*3/MM3 (ref 3.4–10.8)

## 2023-03-28 PROCEDURE — 94799 UNLISTED PULMONARY SVC/PX: CPT

## 2023-03-28 PROCEDURE — 86334 IMMUNOFIX E-PHORESIS SERUM: CPT | Performed by: INTERNAL MEDICINE

## 2023-03-28 PROCEDURE — 82784 ASSAY IGA/IGD/IGG/IGM EACH: CPT | Performed by: INTERNAL MEDICINE

## 2023-03-28 PROCEDURE — 99239 HOSP IP/OBS DSCHRG MGMT >30: CPT | Performed by: INTERNAL MEDICINE

## 2023-03-28 PROCEDURE — 84100 ASSAY OF PHOSPHORUS: CPT | Performed by: INTERNAL MEDICINE

## 2023-03-28 PROCEDURE — 85027 COMPLETE CBC AUTOMATED: CPT | Performed by: INTERNAL MEDICINE

## 2023-03-28 PROCEDURE — 83735 ASSAY OF MAGNESIUM: CPT | Performed by: INTERNAL MEDICINE

## 2023-03-28 PROCEDURE — 63710000001 PREDNISONE PER 1 MG: Performed by: INTERNAL MEDICINE

## 2023-03-28 PROCEDURE — 80048 BASIC METABOLIC PNL TOTAL CA: CPT | Performed by: INTERNAL MEDICINE

## 2023-03-28 RX ORDER — FAMOTIDINE 20 MG/1
20 TABLET, FILM COATED ORAL DAILY
Qty: 30 TABLET | Refills: 0 | Status: SHIPPED | OUTPATIENT
Start: 2023-03-29

## 2023-03-28 RX ORDER — PREDNISONE 20 MG/1
40 TABLET ORAL
Qty: 60 TABLET | Refills: 0 | Status: SHIPPED | OUTPATIENT
Start: 2023-03-29 | End: 2023-04-28

## 2023-03-28 RX ORDER — PREDNISONE 20 MG/1
40 TABLET ORAL
Status: DISCONTINUED | OUTPATIENT
Start: 2023-03-28 | End: 2023-03-28 | Stop reason: HOSPADM

## 2023-03-28 RX ADMIN — Medication 10 ML: at 10:01

## 2023-03-28 RX ADMIN — BUDESONIDE, GLYCOPYRROLATE, AND FORMOTEROL FUMARATE 2 PUFF: 160; 9; 4.8 AEROSOL, METERED RESPIRATORY (INHALATION) at 07:21

## 2023-03-28 RX ADMIN — FAMOTIDINE 20 MG: 20 TABLET, FILM COATED ORAL at 10:01

## 2023-03-28 RX ADMIN — AMLODIPINE BESYLATE 10 MG: 5 TABLET ORAL at 10:01

## 2023-03-28 RX ADMIN — PREDNISONE 40 MG: 20 TABLET ORAL at 10:01

## 2023-03-28 RX ADMIN — ACETAMINOPHEN 1000 MG: 500 TABLET ORAL at 06:22

## 2023-03-28 NOTE — DISCHARGE SUMMARY
[facility]         HOSPITALIST  DISCHARGE SUMMARY    Patient Name: Chad Oneill  : 1960  MRN: [id number]    Date of Admission: 3/25/2023  Date of Discharge:  3/28/23  Primary Care Physician: Kuldeep Winters MD    Consults     Date and Time Order Name Status Description    3/25/2023  8:04 AM Inpatient Nephrology Consult Completed     3/25/2023  3:25 AM Inpatient Hospitalist Consult      3/25/2023  1:32 AM IP General Consult (Use specialty-specific consult if known)            Active and Resolved Hospital Problems:  Active Hospital Problems    Diagnosis POA   • **Acute kidney injury (HCC) [N17.9] Yes      Resolved Hospital Problems   No resolved problems to display.   MEDARDO on CKD stage IIIa  Hypertension  Microscopic hematuria  Proteinuria  GERD  COPD  Recently treated urinary tract infection    Hospital Course     Hospital Course:  Chad Oneill is a 63 y.o. male  brought into the emergency department for evaluation of abnormal labs.  Patient was contacted by his nephrologist office, recommended the patient come to the emergency department for evaluation of abnormal laboratory results.  Patient denies any acute symptoms, however, followed through with recommendations and presented to the ED.  Patient states he has been making urine without any difficulty or urinary symptoms.  Patient has been having his creatinine monitored regularly by his PCP and nephrologist.  There has been some discussion of considering dialysis therapy, patient has been reluctant given his mother's history and subsequent expiration while on dialysis.  Additional serologies have been ordered by nephrologist.  Underwent renal biopsy 3/28.  Due to concern for possible IgA nephropathy, nephrology recommended starting a course of prednisone.  Renal function stabilized and it was felt patient could be discharged safely home from a nephrology standpoint.  He was discharged home in stable condition on 3/28.   Pathology from renal biopsy is pending at the time of this note and will be followed up by nephrology.  Recommend follow-up with PCP in 1 week, recommend follow-up with nephrology within 2 weeks with repeat BMP at that time.  He will continue prednisone 40 mg daily until nephrology follow-up.    DISCHARGE Follow Up Recommendations for labs and diagnostics: BMP in 2 weeks    Day of Discharge     Vital Signs:  Temp:  [97.7 °F (36.5 °C)-99.6 °F (37.6 °C)] 98.6 °F (37 °C)  Heart Rate:  [75-97] 84  Resp:  [12-21] 20  BP: (118-150)/(64-98) 137/74  Physical Exam:   Gen: NAD, WDWN  ENT: PERRL, EOMI   CV: RRR no MRG  Pulm: CTAB, no w/r/r  GI: Abd soft, NTND, +bs  Neuro: Moving all extremities spontaneously, CN II-XII grossly intact   Psych: A&O*3, normal mood and affect  Skin: No lesions or rashes noted    Discharge Details        Discharge Medications      New Medications      Instructions Start Date   famotidine 20 MG tablet  Commonly known as: PEPCID   20 mg, Oral, Daily   Start Date: March 29, 2023     predniSONE 20 MG tablet  Commonly known as: DELTASONE   40 mg, Oral, Daily With Breakfast   Start Date: March 29, 2023        Continue These Medications      Instructions Start Date   albuterol sulfate  (90 Base) MCG/ACT inhaler  Commonly known as: PROVENTIL HFA;VENTOLIN HFA;PROAIR HFA   2 puffs, Inhalation, Every 4 Hours PRN      amLODIPine 10 MG tablet  Commonly known as: NORVASC   Take 1 tablet by mouth once daily      Budeson-Glycopyrrol-Formoterol 160-9-4.8 MCG/ACT aerosol inhaler  Commonly known as: BREZTRI   2 puffs, Inhalation, 2 Times Daily      doxazosin 2 MG tablet  Commonly known as: CARDURA   TAKE 1 TABLET BY MOUTH ONCE DAILY AT NIGHT         Stop These Medications    cefuroxime 500 MG tablet  Commonly known as: CEFTIN     losartan-hydrochlorothiazide 100-25 MG per tablet  Commonly known as: HYZAAR     omeprazole 20 MG capsule  Commonly known as: priLOSEC            Allergies   Allergen Reactions   •  Benadryl [Diphenhydramine] Palpitations       Discharge Disposition:      Diet:  Hospital:  Diet Order   Procedures   • Diet: Renal Diets; Low Sodium (2-3g), Low Potassium; Texture: Regular Texture (IDDSI 7); Fluid Consistency: Thin (IDDSI 0)       Discharge Activity:   Activity Instructions     Activity as Tolerated            CODE STATUS:  Code Status and Medical Interventions:   Ordered at: 03/25/23 0416     Level Of Support Discussed With:    Patient     Code Status (Patient has no pulse and is not breathing):    CPR (Attempt to Resuscitate)     Medical Interventions (Patient has pulse or is breathing):    Full Support         Future Appointments   Date Time Provider Department Center   [facility]23  [facility]   4/4/2023  1:15 PM [place]io Barakat [place] [facility] [place] [facility]   4/11/2023  8:00 AM Kuldeep Winters MD [facility]   9/21/2023 10:00 AM Katia Chavez APRN [facility]       Additional Instructions for the Follow-ups that You Need to Schedule     Discharge Follow-up with PCP   As directed       Currently Documented PCP:    Kuldeep Winters MD    PCP Phone Number:    [phone]     Follow Up Details: 3-5 days         Discharge Follow-up with Specified Provider: Nephrology; 2 Weeks   As directed      To: Nephrology    Follow Up: 2 Weeks               Pertinent  and/or Most Recent Results     PROCEDURES:   None    LAB RESULTS:      Lab 03/28/23  0533 03/27/23  0421 03/26/23  0453 03/25/23  0648 03/24/23  2200   WBC 11.94* 9.70 9.58 10.91* 11.14*   HEMOGLOBIN 9.9* 10.4* 10.6* 11.1* 11.9*   HEMATOCRIT 28.0* 28.3* 29.2* 30.9* 34.1*   PLATELETS 336 335 356 362 390   NEUTROS ABS  --  7.01* 6.91  --  8.67*   IMMATURE GRANS (ABS)  --  0.03 0.11*  --  0.05   LYMPHS ABS  --  1.51 1.46  --  1.29   MONOS ABS  --  0.77 0.71  --  0.74   EOS ABS  --  0.35 0.36  --  0.35   MCV 96.2 95.3 94.8 95.1 95.5   PROTIME  --  15.6*  --   --   --    APTT  --  34.9*  --   --   --           Lab 03/28/23  0533 03/27/23  0421 03/26/23  0453 03/25/23  0648 03/24/23  2200   SODIUM 140 142 140 137 138   POTASSIUM 4.0 4.0 4.0 3.4* 3.9   CHLORIDE 102 104 106 101 99   CO2 25.9 26.1 24.6 23.6 25.6   ANION GAP 12.1 11.9 9.4 12.4 13.4   BUN 59* 54* 57* 64* 65*   CREATININE 3.90* 3.54* 3.87* 3.95* 4.38*   EGFR 16.4* 18.4* 16.6* 16.2* 14.3*   GLUCOSE 96 93 102* 104* 166*   CALCIUM 8.6 8.6 8.4* 8.2* 8.7   MAGNESIUM 1.6 1.6 1.7  --  2.1   PHOSPHORUS 3.9  --   --   --  4.3   HEMOGLOBIN A1C  --   --   --  5.80*  --          Lab 03/26/23  0453 03/24/23  2200   TOTAL PROTEIN 7.2 8.2   ALBUMIN 3.1* 3.6   GLOBULIN 4.1 4.6   ALT (SGPT) 46* 66*   AST (SGOT) 27 36   BILIRUBIN 0.3 0.2   ALK PHOS 38* 43         Lab 03/27/23  0421   PROTIME 15.6*   INR 1.24*                 Brief Urine Lab Results  (Last result in the past 365 days)      Color   Clarity   Blood   Leuk Est   Nitrite   Protein   CREAT   Urine HCG        03/25/23 0231 Yellow   Cloudy   Large (3+)   Trace   Negative   100 mg/dL (2+)               Microbiology Results (last 10 days)     Procedure Component Value - Date/Time    Urine Culture - Urine, Urine, Clean Catch [[id number]]  (Normal) Collected: 03/18/23 1333    Lab Status: Final result Specimen: Urine, Clean Catch Updated: 03/19/23 2324     Urine Culture No growth          CT Abdomen Pelvis Without Contrast    Result Date: 3/18/2023  Impression:   No definite acute findings are seen.  There is nonspecific distension of the urinary bladder.  Mild prostatomegaly is suspected.  No urinary tract calculi are appreciated.  No hydronephrosis.  Please see above comments for further detail.     Please note that portions of this note were completed with a voice recognition program.  CLIFFORD MERCADO JR, MD       Electronically Signed and Approved By: CLIFFORD MERCADO JR, MD on 3/18/2023 at 21:41            XR Chest 1 View    Result Date: 3/18/2023  Impression:  No active disease.       TIBURCIO VIZCAINO MD        Electronically Signed and Approved By: TIBURCIO VIZCAINO MD on 3/18/2023 at 18:40             XR Chest 1 View    Result Date: 3/14/2023  Impression:   1. No acute cardiopulmonary disease       Dagoberto Desouza M.D.       Electronically Signed and Approved By: Dagoberto Desouza M.D. on 3/14/2023 at 15:47             CT Needle Biopsy Kidney Renal    Result Date: 3/27/2023  Impression:   1. Successful CT-guided random core needle biopsy left renal cortex with no immediate complications.    KENYETTA CHOWDHURY MD       Electronically Signed and Approved By: KENYETTA CHOWDHURY MD on 3/27/2023 at 16:04             XR Chest PA & Lateral    Result Date: 3/17/2023  Impression:  No acute cardiopulmonary process.     GIDEON FINK MD       Electronically Signed and Approved By: GIDEON FINK MD on 3/17/2023 at 16:07                       Results for orders placed during the hospital encounter of 10/19/22    Adult Transthoracic Echo Complete W/ Cont if Necessary Per Protocol    Interpretation Summary  •  Left ventricular ejection fraction appears to be 56 - 60%.  •  Left ventricular diastolic function is consistent with (grade I) impaired relaxation.  •  Mild aortic valve stenosis is present with aortic valve area 1.52 cm² to max/mean pressure gradient 21/12 mmHg.  Mild to moderate aortic insufficiency.  •  Mild mitral and tricuspid regurgitation.      Labs Pending at Discharge:  Pending Labs     Order Current Status    Tissue Pathology Exam Collected (03/27/23 1541)    Glomerular Basement Membrane Antibodies In process    Immunofixation, Serum In process    Immunofixation, Serum In process    Immunofixation, Urine - Urine, Clean Catch In process    Tissue Pathology Exam In process            Time spent on Discharge including face to face service:  34 minutes    Electronically signed by Conrad Shah MD, 03/28/23, 10:28 AM EDT.

## 2023-03-28 NOTE — PLAN OF CARE
Goal Outcome Evaluation:  Plan of Care Reviewed With: patient        Progress: improving  Outcome Evaluation: Patient remains a&ox4. Patient medicated once with PRN tylenol for back pain. Patient expresses wishes to be discharged today. No other issues at this time. VSS.

## 2023-03-28 NOTE — OUTREACH NOTE
Prep Survey    Flowsheet Row Responses   [facility] facility patient discharged from? [facility]   Is LACE score < 7 ? No   Eligibility [facility]   Hospital [facility]   Date of Admission 03/25/23   Date of Discharge 03/28/23   Discharge Disposition Home or Self Care   Discharge diagnosis acute Kidney injury   Does the patient have one of the following disease processes/diagnoses(primary or secondary)? Other   Does the patient have Home health ordered? No   Is there a DME ordered? No   Prep survey completed? Yes          NOA A - Registered Nurse

## 2023-03-28 NOTE — PROGRESS NOTES
[facility]     Nephrology Progress Note      Patient Name: Chad Oneill  : 1960  MRN: [id number]  Primary Care Physician:  Kuldeep Winters MD  Date of admission: 3/25/2023    Subjective   Subjective     Interval History:  Patient Reports feeling okay.  Asking about going home.  No shortness of breath or chest pain.  No fevers or chills.      Review of Systems   All systems were reviewed and negative except for: What is mentioned above.    Objective   Objective     Vitals:   Temp:  [97.7 °F (36.5 °C)-99.6 °F (37.6 °C)] 98.6 °F (37 °C)  Heart Rate:  [75-97] 84  Resp:  [12-21] 20  BP: (118-150)/(64-98) 137/74  Physical Exam:   Constitutional: Awake, alert, not in distress.   Eyes: sclerae anicteric, no conjunctival injection   HENT: mucous membranes moist, edentulous.   Neck: Supple, no thyromegaly, no lymphadenopathy, trachea midline, No JVD   Respiratory: Clear to auscultation bilaterally, nonlabored respirations    Cardiovascular: RRR, no murmurs, rubs, or gallops.   Gastrointestinal: Positive bowel sounds, soft, nontender, nondistended, some suprapubic fullness.   Musculoskeletal: No edema, no clubbing or cyanosis   Psychiatric: Appropriate affect, cooperative   Neurologic: Oriented x 3, moving all extremities, Cranial Nerves grossly intact, speech clear   Skin: warm and dry, no rashes     Result Review    Result Reviewed:  I have personally reviewed the results from the time of this admission to 3/28/2023 08:15 EDT and agree with these findings:  [x]  Laboratory  []  Microbiology  [x]  Radiology  []  EKG/Telemetry   []  Cardiology/Vascular   []  Pathology  [x]  Old records  []  Other:  Lab Results   Component Value Date    GLUCOSE 96 2023    CALCIUM 8.6 2023     2023    K 4.0 2023    CO2 25.9 2023     2023    BUN 59 (H) 2023    CREATININE 3.90 (H) 2023    EGFRIFNONA 43 (L) 2021    BCR 15.1 2023    ANIONGAP 12.1 2023      Lab Results   Component Value Date    CALCIUM 8.6 03/28/2023    PHOS 3.9 03/28/2023      Results from last 7 days   Lab Units 03/28/23  0533   MAGNESIUM mg/dL 1.6            Assessment & Plan   Assessment / Plan       Active Hospital Problems:  Active Hospital Problems    Diagnosis    • **Acute kidney injury (HCC)        Assessment and Plan:    - Acute kidney injury on CKD stage III, exact etiology is uncertain, possible glomerulonephritis, serology and immunology negative so far, anti-GBM is pending.  Status post CT-guided renal biopsy yesterday, well-tolerated, no complications so far.  Urological work-up was negative.  Losartan/HCTZ on hold.  Off IV fluid for now.  Creatinine appears stable.  Awaiting path report for further treatment but possible IgA.  Will start prednisone 40mg/day.  I think would be ok for d/c from renal standpoint.  Will need f/u in 2 weeks.  Continue to hold diuretic/arb.  - CKD stage III, active urine sediment and subnephrotic range proteinuria, etiology is uncertain, has a history of hypertension.  Status post biopsy as above.  Previous creatinine baseline around 1.8.?  IgA nephropathy.  - Suprapubic fullness, etiology uncertain, check postvoid bladder scan x1 had 0 mL.    - Hypertension, blood pressure is acceptable.  - Proteinuria, subnephrotic, holding ARB as above.  Check serum and urine immunofixation.  - GERD, off PPI, on H2 blockers.  - Recent UTI, treated empirically.

## 2023-03-29 ENCOUNTER — TRANSITIONAL CARE MANAGEMENT TELEPHONE ENCOUNTER (OUTPATIENT)
Dept: CALL CENTER | Facility: HOSPITAL | Age: 63
End: 2023-03-29
Payer: MEDICARE

## 2023-03-29 LAB
IGA SERPL-MCNC: 467 MG/DL (ref 61–437)
IGA SERPL-MCNC: 474 MG/DL (ref 61–437)
IGG SERPL-MCNC: 1581 MG/DL (ref 603–1613)
IGG SERPL-MCNC: 1588 MG/DL (ref 603–1613)
IGM SERPL-MCNC: 58 MG/DL (ref 20–172)
IGM SERPL-MCNC: 60 MG/DL (ref 20–172)
INTERPRETATION UR IFE-IMP: NORMAL
PROT PATTERN SERPL IFE-IMP: ABNORMAL
PROT PATTERN SERPL IFE-IMP: ABNORMAL

## 2023-03-29 NOTE — OUTREACH NOTE
Call Center TCM Note    Flowsheet Row Responses   [facility] patient discharged from? [facility]   Does the patient have one of the following disease processes/diagnoses(primary or secondary)? Other   TCM attempt successful? Yes   Call start time 1507   Call end time 1509   Discharge diagnosis acute Kidney injury   Meds reviewed with patient/caregiver? Yes   Is the patient having any side effects they believe may be caused by any medication additions or changes? No   Does the patient have all medications ordered at discharge? Yes   Is the patient taking all medications as directed (includes completed medication regime)? Yes   Does the patient have an appointment with their PCP within 7 days of discharge? Yes   Has home health visited the patient within 72 hours of discharge? N/A   Psychosocial issues? No   Did the patient receive a copy of their discharge instructions? Yes   Nursing interventions Reviewed instructions with patient   What is the patient's perception of their health status since discharge? Improving   Is the patient/caregiver able to teach back signs and symptoms related to disease process for when to call PCP? Yes   Is the patient/caregiver able to teach back signs and symptoms related to disease process for when to call 911? Yes   Is the patient/caregiver able to teach back the hierarchy of who to call/visit for symptoms/problems? PCP, Specialist, Home health nurse, Urgent Care, ED, 911 Yes   If the patient is a current smoker, are they able to teach back resources for cessation? Not a smoker   TCM call completed? Yes   Call end time 1509   Would this patient benefit from a Referral to Amb Social Work? No   Is the patient interested in additional calls from an ambulatory ?  NOTE:  applies to high risk patients requiring additional follow-up. No          Anitra Seaman LPN    3/29/2023, 15:10 EDT

## 2023-03-29 NOTE — OUTREACH NOTE
Call Center TCM Note    Flowsheet Row Responses   [facility] patient discharged from? [facility]   Does the patient have one of the following disease processes/diagnoses(primary or secondary)? Other   TCM attempt successful? No   Unsuccessful attempts Attempt 1          Anitra Seaman LPN    3/29/2023, 09:54 EDT

## 2023-03-30 LAB
CYTO UR: NORMAL
LAB AP CASE REPORT: NORMAL
LAB AP CLINICAL INFORMATION: NORMAL
PATH REPORT.FINAL DX SPEC: NORMAL
PATH REPORT.GROSS SPEC: NORMAL

## 2023-04-03 ENCOUNTER — LAB (OUTPATIENT)
Dept: LAB | Facility: HOSPITAL | Age: 63
End: 2023-04-03
Payer: MEDICARE

## 2023-04-03 ENCOUNTER — TRANSCRIBE ORDERS (OUTPATIENT)
Dept: LAB | Facility: HOSPITAL | Age: 63
End: 2023-04-03
Payer: MEDICARE

## 2023-04-03 DIAGNOSIS — N02.8 PRIMARY IGA NEPHROPATHY: ICD-10-CM

## 2023-04-03 DIAGNOSIS — N02.8 PRIMARY IGA NEPHROPATHY: Primary | ICD-10-CM

## 2023-04-03 PROCEDURE — 80048 BASIC METABOLIC PNL TOTAL CA: CPT

## 2023-04-03 PROCEDURE — 36415 COLL VENOUS BLD VENIPUNCTURE: CPT

## 2023-04-04 ENCOUNTER — OFFICE VISIT (OUTPATIENT)
Dept: CARDIOLOGY | Facility: CLINIC | Age: 63
End: 2023-04-04
Payer: MEDICARE

## 2023-04-04 VITALS
HEART RATE: 83 BPM | WEIGHT: 190.8 LBS | DIASTOLIC BLOOD PRESSURE: 72 MMHG | BODY MASS INDEX: 28.26 KG/M2 | HEIGHT: 69 IN | SYSTOLIC BLOOD PRESSURE: 147 MMHG

## 2023-04-04 DIAGNOSIS — I35.0 NONRHEUMATIC AORTIC VALVE STENOSIS: ICD-10-CM

## 2023-04-04 DIAGNOSIS — I10 PRIMARY HYPERTENSION: ICD-10-CM

## 2023-04-04 DIAGNOSIS — I35.1 NONRHEUMATIC AORTIC VALVE INSUFFICIENCY: ICD-10-CM

## 2023-04-04 DIAGNOSIS — R07.9 CHEST PAIN, UNSPECIFIED TYPE: Primary | ICD-10-CM

## 2023-04-04 LAB
ANION GAP SERPL CALCULATED.3IONS-SCNC: 12 MMOL/L (ref 5–15)
BUN SERPL-MCNC: 42 MG/DL (ref 8–23)
BUN/CREAT SERPL: 16 (ref 7–25)
CALCIUM SPEC-SCNC: 9.1 MG/DL (ref 8.6–10.5)
CHLORIDE SERPL-SCNC: 107 MMOL/L (ref 98–107)
CO2 SERPL-SCNC: 23 MMOL/L (ref 22–29)
CREAT SERPL-MCNC: 2.62 MG/DL (ref 0.76–1.27)
EGFRCR SERPLBLD CKD-EPI 2021: 26.5 ML/MIN/1.73
GLUCOSE SERPL-MCNC: 126 MG/DL (ref 65–99)
POTASSIUM SERPL-SCNC: 5.1 MMOL/L (ref 3.5–5.2)
SODIUM SERPL-SCNC: 142 MMOL/L (ref 136–145)

## 2023-04-04 PROCEDURE — 99204 OFFICE O/P NEW MOD 45 MIN: CPT | Performed by: INTERNAL MEDICINE

## 2023-04-04 PROCEDURE — 3077F SYST BP >= 140 MM HG: CPT | Performed by: INTERNAL MEDICINE

## 2023-04-04 PROCEDURE — 3078F DIAST BP <80 MM HG: CPT | Performed by: INTERNAL MEDICINE

## 2023-04-04 NOTE — PROGRESS NOTES
Chief Complaint  Establish Care, Chest Pain, and Hypertension    Subjective        Chad Oneill presents to [facility]  History of present illness:    Patient is a 63-year-old male with longstanding history of a heart murmur.  He also sees a pulmonary doctor for COPD.  He was in the emergency department and hospital multiple times in March due to acute on chronic kidney disease.  Apparently a biopsy was done that possibly showed IgA nephropathy and he was put on prednisone.  He does know one of the times he presented to the emergency department with chest pain.  It was retrosternal lasting for a few minutes.  He states he has had none since.  When he is walking around he notes no chest pain.  He does have a remote smoking history.  No significant alcohol.  Mother had coronary artery bypass grafting.      Past Medical History:   Diagnosis Date   • Acute bronchitis, unspecified    • Acute upper respiratory infection, unspecified    • Anxiety disorder, unspecified    • Anxiety with depression    • Chronic kidney disease, stage III (moderate)    • Colon polyp    • Cough    • Dizziness and giddiness    • Emphysema, unspecified    • Fatigue    • GERD (gastroesophageal reflux disease)    • Heartburn    • Hypertension    • Mild intermittent acute bronchitis with asthma with acute exacerbation    • Nonrheumatic aortic (valve) stenosis    • Vision problem          Past Surgical History:   Procedure Laterality Date   • CIRCUMCISION     • COLONOSCOPY W/ BIOPSIES AND POLYPECTOMY     • ENDOSCOPY  2013   • HAND SURGERY Right     INJURY TO R HAND   • KNEE ARTHROSCOPY Left           Social History     Socioeconomic History   • Marital status:    • Number of children: 2   Tobacco Use   • Smoking status: Former     Packs/day: 0.25     Years: 30.00     Pack years: 7.50     Types: Cigarettes     Start date:      Quit date:      Years since quittin.2   • Smokeless tobacco: Former  "    Types: Chew   Vaping Use   • Vaping Use: Never used   Substance and Sexual Activity   • Alcohol use: Not Currently     Comment: non-drinker   • Drug use: Never     Comment: none   • Sexual activity: Defer         Family History   Problem Relation Age of Onset   • Coronary artery disease Mother    • Pulmonary embolism Mother         POST OP   • Heart attack Brother    • Prostate cancer Brother    • COPD Brother    • Alcohol abuse Maternal Grandfather           Allergies   Allergen Reactions   • Benadryl [Diphenhydramine] Palpitations            Current Outpatient Medications:   •  albuterol sulfate  (90 Base) MCG/ACT inhaler, Inhale 2 puffs Every 4 (Four) Hours As Needed for Wheezing., Disp: 8 g, Rfl: 0  •  amLODIPine (NORVASC) 10 MG tablet, Take 1 tablet by mouth once daily, Disp: 90 tablet, Rfl: 0  •  Budeson-Glycopyrrol-Formoterol (BREZTRI) 160-9-4.8 MCG/ACT aerosol inhaler, Inhale 2 puffs 2 (Two) Times a Day., Disp: 10.7 g, Rfl: 0  •  doxazosin (CARDURA) 2 MG tablet, TAKE 1 TABLET BY MOUTH ONCE DAILY AT NIGHT, Disp: 90 tablet, Rfl: 1  •  famotidine (PEPCID) 20 MG tablet, Take 1 tablet by mouth Daily., Disp: 30 tablet, Rfl: 0  •  predniSONE (DELTASONE) 20 MG tablet, Take 2 tablets by mouth Daily With Breakfast for 30 days., Disp: 60 tablet, Rfl: 0      ROS:  Cardiac review of systems negative.    Objective     /72   Pulse 83   Ht 175.3 cm (69\")   Wt 86.5 kg (190 lb 12.8 oz)   BMI 28.18 kg/m²       General Appearance:   · well developed  · well nourished  HENT:   · oropharynx moist  · lips not cyanotic  Respiratory:  · no respiratory distress  · normal breath sounds  · no rales  Cardiovascular:  · no jugular venous distention  · regular rhythm  · S1 normal, S2 normal  · no S3, no S4   · no murmur  · no rub, no thrill  · No carotid bruit  · pedal pulses normal  · lower extremity edema: none    Musculoskeletal:  · no clubbing of fingers.   · normocephalic, head atraumatic  Skin:   · warm, " dry  Psychiatric:  · judgement and insight appropriate  · normal mood and affect    ECHO:  Results for orders placed during the hospital encounter of 10/19/22    Adult Transthoracic Echo Complete W/ Cont if Necessary Per Protocol    Interpretation Summary  •  Left ventricular ejection fraction appears to be 56 - 60%.  •  Left ventricular diastolic function is consistent with (grade I) impaired relaxation.  •  Mild aortic valve stenosis is present with aortic valve area 1.52 cm² to max/mean pressure gradient 21/12 mmHg.  Mild to moderate aortic insufficiency.  •  Mild mitral and tricuspid regurgitation.    STRESS:    CATH:  No results found for this or any previous visit.    BMP:   Glucose   Date Value Ref Range Status   04/03/2023 126 (H) 65 - 99 mg/dL Final     BUN   Date Value Ref Range Status   04/03/2023 42 (H) 8 - 23 mg/dL Final     Creatinine   Date Value Ref Range Status   04/03/2023 2.62 (H) 0.76 - 1.27 mg/dL Final     Sodium   Date Value Ref Range Status   04/03/2023 142 136 - 145 mmol/L Final     Potassium   Date Value Ref Range Status   04/03/2023 5.1 3.5 - 5.2 mmol/L Final     Chloride   Date Value Ref Range Status   04/03/2023 107 98 - 107 mmol/L Final     CO2   Date Value Ref Range Status   04/03/2023 23.0 22.0 - 29.0 mmol/L Final     Calcium   Date Value Ref Range Status   04/03/2023 9.1 8.6 - 10.5 mg/dL Final     BUN/Creatinine Ratio   Date Value Ref Range Status   04/03/2023 16.0 7.0 - 25.0 Final     Anion Gap   Date Value Ref Range Status   04/03/2023 12.0 5.0 - 15.0 mmol/L Final     eGFR   Date Value Ref Range Status   04/03/2023 26.5 (L) >60.0 mL/min/1.73 Final     LIPIDS:  Total Cholesterol   Date Value Ref Range Status   10/10/2022 192 0 - 200 mg/dL Final     Triglycerides   Date Value Ref Range Status   10/10/2022 296 (H) 0 - 150 mg/dL Final     HDL Cholesterol   Date Value Ref Range Status   10/10/2022 36 (L) 40 - 60 mg/dL Final     LDL Cholesterol    Date Value Ref Range Status   10/10/2022  105 (H) 0 - 100 mg/dL Final     VLDL Cholesterol   Date Value Ref Range Status   10/10/2022 51 (H) 5 - 40 mg/dL Final     LDL/HDL Ratio   Date Value Ref Range Status   10/10/2022 2.69  Final         Procedures             ASSESSMENT:  Diagnoses and all orders for this visit:    1. Chest pain, unspecified type (Primary)    2. Primary hypertension    3. Nonrheumatic aortic valve stenosis    4. Nonrheumatic aortic valve insufficiency         PLAN:    1.  For patient's episode of chest pain he did present to the emergency department 3/14/2023.  EKG done at the time showed no significant abnormalities.  He had 2 sets of high-sensitivity troponins that were in the normal range with no significant delta.  He had another one done on 16 March which was also very stable.  He has had no further chest pain since that time.  2.  Patient had an echocardiogram 10/19/2022 which showed normal ejection fraction.  He had mild aortic stenosis with a peak/mean gradient of 21/12 mmHg.  He had mild to moderate aortic insufficiency.  I told him I would consider repeating this in August 2023 to see if it is changing at all.  3.  Patient had cholesterol checked 10/10/2022 with , HDL 36, and triglycerides 296.  Would just can continue diet and exercise.  4.  Patient is being monitored for his kidney disease.  There is a possibility it is IgA nephropathy and he was given steroids.  5.  Blood pressures under fair control.  He had to be taken off the losartan HCT with the newly diagnosed kidney disease.  6.  Patient is going to start back becoming more active.  He will call us if any exertional chest pain.      Return in about 29 weeks (around 10/24/2023).     Patient was given instructions and counseling regarding his condition or for health maintenance advice. Please see specific information pulled into the AVS if appropriate.         Honorio Barakat MD   4/4/2023  14:24 EDT

## 2023-04-11 ENCOUNTER — OFFICE VISIT (OUTPATIENT)
Dept: FAMILY MEDICINE CLINIC | Age: 63
End: 2023-04-11
Payer: MEDICARE

## 2023-04-11 ENCOUNTER — TELEPHONE (OUTPATIENT)
Dept: FAMILY MEDICINE CLINIC | Age: 63
End: 2023-04-11

## 2023-04-11 VITALS
SYSTOLIC BLOOD PRESSURE: 184 MMHG | TEMPERATURE: 98.1 F | HEART RATE: 75 BPM | DIASTOLIC BLOOD PRESSURE: 88 MMHG | HEIGHT: 69 IN | WEIGHT: 192.6 LBS | BODY MASS INDEX: 28.53 KG/M2 | OXYGEN SATURATION: 97 %

## 2023-04-11 DIAGNOSIS — N18.4 STAGE 4 CHRONIC KIDNEY DISEASE: ICD-10-CM

## 2023-04-11 DIAGNOSIS — I10 HYPERTENSION, ESSENTIAL: Primary | ICD-10-CM

## 2023-04-11 DIAGNOSIS — N02.8 IGA NEPHROPATHY: ICD-10-CM

## 2023-04-11 DIAGNOSIS — F41.9 ANXIETY: ICD-10-CM

## 2023-04-11 PROBLEM — N18.32 STAGE 3B CHRONIC KIDNEY DISEASE: Status: ACTIVE | Noted: 2021-09-20

## 2023-04-11 PROBLEM — N02.B9 IGA NEPHROPATHY: Status: ACTIVE | Noted: 2023-04-11

## 2023-04-11 RX ORDER — DOXAZOSIN MESYLATE 4 MG/1
4 TABLET ORAL NIGHTLY
Qty: 30 TABLET | Refills: 2 | Status: SHIPPED | OUTPATIENT
Start: 2023-04-11

## 2023-04-11 RX ORDER — ESCITALOPRAM OXALATE 5 MG/1
5 TABLET ORAL DAILY
Qty: 30 TABLET | Refills: 2 | Status: SHIPPED | OUTPATIENT
Start: 2023-04-11

## 2023-04-11 RX ORDER — TRAMADOL HYDROCHLORIDE 50 MG/1
50 TABLET ORAL EVERY 8 HOURS PRN
COMMUNITY
Start: 2023-03-15 | End: 2023-04-11

## 2023-04-11 RX ORDER — CEFUROXIME AXETIL 500 MG/1
500 TABLET ORAL DAILY
COMMUNITY
Start: 2023-03-17 | End: 2023-04-11

## 2023-04-11 NOTE — PROGRESS NOTES
Chief Complaint  Hypertension (6 mo. F/U ), Hospital Follow Up Visit (Acute Kidney issue /Admission: 3/25/2023/Date of Discharge:  3/28/23), and Shaking (Pt states he has been shaky & nervous, requesting to restart ativan )    Subjective          Chad Oneill presents to [facility]  History of Present Illness  Chad is here following his recent hospitalization for acute renal failure.  Proceeded to have renal biopsy which revealed IgA nephropathy.  His renal function has improved since been placed on prednisone.  He has follow-up visit with nephrology coming up on April 14.    He has noted a secondary benefit that steroids did help alleviate his cough as well.    Says that he feels pretty good.  Actually he worked out in ER quite a bit yesterday and his strength is pretty much back to normal.    Does admit feeling quite a bit of anxiety over all this going on in the past few weeks.  He was asking if he can get placed back on Valium which she took many years ago from Dr. Singh.  Told him that would be a medicine we try to avoid but certainly we can try different type medication to help with his anxiety be less concerning for habituation.    He had initially stated he was going to stop going to the eye doctor to get the injections in the eye.  I explained to him the importance of the injections for prophylaxis and recommend he continue to take the treatments.    Current Outpatient Medications   Medication Sig Dispense Refill   • albuterol sulfate  (90 Base) MCG/ACT inhaler Inhale 2 puffs Every 4 (Four) Hours As Needed for Wheezing. 8 g 0   • amLODIPine (NORVASC) 10 MG tablet Take 1 tablet by mouth once daily 90 tablet 0   • Budeson-Glycopyrrol-Formoterol (BREZTRI) 160-9-4.8 MCG/ACT aerosol inhaler Inhale 2 puffs 2 (Two) Times a Day. 10.7 g 0   • famotidine (PEPCID) 20 MG tablet Take 1 tablet by mouth Daily. 30 tablet 0   • predniSONE (DELTASONE) 20 MG tablet Take 2  "tablets by mouth Daily With Breakfast for 30 days. 60 tablet 0   • doxazosin (Cardura) 4 MG tablet Take 1 tablet by mouth Every Night. 30 tablet 2   • escitalopram (LEXAPRO) 5 MG tablet Take 1 tablet by mouth Daily. 30 tablet 2     No current facility-administered medications for this visit.       Review of Systems         Objective   Vital Signs:   BP (!) 184/88 Comment: Right arm, adult cuff, by [place]  Pulse 75   Temp 98.1 °F (36.7 °C) (Oral)   Ht 175.3 cm (69\")   Wt 87.4 kg (192 lb 9.6 oz)   SpO2 97%   BMI 28.44 kg/m²     Physical Exam  Constitutional:       Appearance: Normal appearance.   Neck:      Vascular: No carotid bruit.   Cardiovascular:      Rate and Rhythm: Normal rate and regular rhythm.      Heart sounds: Normal heart sounds. No murmur heard.  Pulmonary:      Effort: No respiratory distress.      Breath sounds: No wheezing or rales.   Musculoskeletal:      Right lower leg: No edema.      Left lower leg: No edema.   Lymphadenopathy:      Cervical: No cervical adenopathy.   Neurological:      General: No focal deficit present.      Mental Status: He is alert.   Psychiatric:         Attention and Perception: Attention normal.         Mood and Affect: Mood normal.         Speech: Speech normal.            Result Review :                     Assessment and Plan    Diagnoses and all orders for this visit:    1. Hypertension, essential (Primary)  Comments:  His blood pressure looks okay we will continue current regimen  Assessment & Plan:  Blood pressure significantly elevated today.  He is not experiencing any orthostatic type symptoms.  I will go ahead and increase his doxazosin to 4 mg nightly.  Continue to follow with nephrology.    Orders:  -     doxazosin (Cardura) 4 MG tablet; Take 1 tablet by mouth Every Night.  Dispense: 30 tablet; Refill: 2    2. Stage 4 chronic kidney disease  Assessment & Plan:  Discussed with him the significance of his disease and importance of close follow-up with " nephrology.  Avoid nephrotoxins.  Continue on medications as prescribed.      3. IgA nephropathy  Assessment & Plan:  Currently on prednisone.  Following up with nephrology as directed      4. Anxiety  Assessment & Plan:  We will start him on some escitalopram told him which like to avoid benzodiazepines    Orders:  -     escitalopram (LEXAPRO) 5 MG tablet; Take 1 tablet by mouth Daily.  Dispense: 30 tablet; Refill: 2      Follow Up   No follow-ups on file.  Patient was given instructions and counseling regarding his condition or for health maintenance advice. Please see specific information pulled into the AVS if appropriate.

## 2023-04-11 NOTE — ASSESSMENT & PLAN NOTE
Blood pressure significantly elevated today.  He is not experiencing any orthostatic type symptoms.  I will go ahead and increase his doxazosin to 4 mg nightly.  Continue to follow with nephrology.

## 2023-04-11 NOTE — ASSESSMENT & PLAN NOTE
Discussed with him the significance of his disease and importance of close follow-up with nephrology.  Avoid nephrotoxins.  Continue on medications as prescribed.

## 2023-04-19 DIAGNOSIS — I10 HYPERTENSION, ESSENTIAL: ICD-10-CM

## 2023-04-19 RX ORDER — AMLODIPINE BESYLATE 10 MG/1
TABLET ORAL
Qty: 90 TABLET | Refills: 0 | Status: SHIPPED | OUTPATIENT
Start: 2023-04-19

## 2023-04-19 NOTE — PROGRESS NOTES
"Enter Query Response Below      Query Response: yes.  Electronically signed by Tyrel Pabon MD, 23, 2:48 PM EDT.               If applicable, please update the problem list.   Patient: Chad Oneill        : 1960  Account: [id number]           Admit Date: 3/24/2023        How to Respond to this query:       a. Click New Note     b. Answer query within the yellow box.                c. Update the Problem List, if applicable.      If you have any questions about this query contact me at: [email or web address]      Dr. Pabon,    Based on Medicare billing guidelines [facility] are not allowed to use diagnoses from pathology reports as the sole basis for billing. Any findings noted on a pathology report must be affirmed by the treating physician before billing.    The pathologist reported that the specimen shows:  \"FINAL DIAGNOSIS  Kidney, left, biopsy:  - IgA nephropathy with focal crescents. See scanned report for comment.  - Acute tubular injury.\"    Is this finding consistent with your clinical impression and treatment plan for this patient?    Yes  No  Other explanation for clinical impression and treatment plan_________  Clinically indeterminable    By submitting this query, we are merely seeking further clarification of documentation to accurately reflect all conditions that you are monitoring, evaluating, treating or that extend the hospitalization or utilize additional resources of care. Please utilize your independent clinical judgment when addressing the question(s) above.     This query and your response, once completed, will be entered into the legal medical record.    Sincerely,  Priti FOX, RN, CDIS  Clinical Documentation Improvement Program  [email or web address]  "

## 2023-04-29 DIAGNOSIS — I10 HYPERTENSION, ESSENTIAL: ICD-10-CM

## 2023-05-01 ENCOUNTER — TRANSCRIBE ORDERS (OUTPATIENT)
Dept: ADMINISTRATIVE | Facility: HOSPITAL | Age: 63
End: 2023-05-01
Payer: MEDICARE

## 2023-05-01 ENCOUNTER — LAB (OUTPATIENT)
Dept: LAB | Facility: HOSPITAL | Age: 63
End: 2023-05-01
Payer: MEDICARE

## 2023-05-01 DIAGNOSIS — D84.9 DEFICIENCY SYNDROME, IMMUNOLOGIC: ICD-10-CM

## 2023-05-01 DIAGNOSIS — D84.9 DEFICIENCY SYNDROME, IMMUNOLOGIC: Primary | ICD-10-CM

## 2023-05-01 LAB
BASOPHILS # BLD AUTO: 0.02 10*3/MM3 (ref 0–0.2)
BASOPHILS NFR BLD AUTO: 0.1 % (ref 0–1.5)
DEPRECATED RDW RBC AUTO: 52.4 FL (ref 37–54)
EOSINOPHIL # BLD AUTO: 0.12 10*3/MM3 (ref 0–0.4)
EOSINOPHIL NFR BLD AUTO: 0.8 % (ref 0.3–6.2)
ERYTHROCYTE [DISTWIDTH] IN BLOOD BY AUTOMATED COUNT: 13.7 % (ref 12.3–15.4)
HCT VFR BLD AUTO: 39.9 % (ref 37.5–51)
HGB BLD-MCNC: 13 G/DL (ref 13–17.7)
IMM GRANULOCYTES # BLD AUTO: 0.41 10*3/MM3 (ref 0–0.05)
IMM GRANULOCYTES NFR BLD AUTO: 2.7 % (ref 0–0.5)
LYMPHOCYTES # BLD AUTO: 2.51 10*3/MM3 (ref 0.7–3.1)
LYMPHOCYTES NFR BLD AUTO: 16.5 % (ref 19.6–45.3)
MCH RBC QN AUTO: 33.6 PG (ref 26.6–33)
MCHC RBC AUTO-ENTMCNC: 32.6 G/DL (ref 31.5–35.7)
MCV RBC AUTO: 103.1 FL (ref 79–97)
MONOCYTES # BLD AUTO: 0.94 10*3/MM3 (ref 0.1–0.9)
MONOCYTES NFR BLD AUTO: 6.2 % (ref 5–12)
NEUTROPHILS NFR BLD AUTO: 11.25 10*3/MM3 (ref 1.7–7)
NEUTROPHILS NFR BLD AUTO: 73.7 % (ref 42.7–76)
PLATELET # BLD AUTO: 174 10*3/MM3 (ref 140–450)
PMV BLD AUTO: 10.4 FL (ref 6–12)
RBC # BLD AUTO: 3.87 10*6/MM3 (ref 4.14–5.8)
WBC NRBC COR # BLD: 15.25 10*3/MM3 (ref 3.4–10.8)

## 2023-05-01 PROCEDURE — 85025 COMPLETE CBC W/AUTO DIFF WBC: CPT

## 2023-05-01 PROCEDURE — 36415 COLL VENOUS BLD VENIPUNCTURE: CPT

## 2023-05-01 RX ORDER — DOXAZOSIN 2 MG/1
TABLET ORAL
Qty: 90 TABLET | Refills: 0 | OUTPATIENT
Start: 2023-05-01

## 2023-05-15 ENCOUNTER — LAB (OUTPATIENT)
Dept: LAB | Facility: HOSPITAL | Age: 63
End: 2023-05-15
Payer: MEDICARE

## 2023-05-15 DIAGNOSIS — D84.9 DEFICIENCY SYNDROME, IMMUNOLOGIC: ICD-10-CM

## 2023-05-15 LAB
BASOPHILS # BLD AUTO: 0.03 10*3/MM3 (ref 0–0.2)
BASOPHILS NFR BLD AUTO: 0.2 % (ref 0–1.5)
DEPRECATED RDW RBC AUTO: 50.5 FL (ref 37–54)
EOSINOPHIL # BLD AUTO: 0.09 10*3/MM3 (ref 0–0.4)
EOSINOPHIL NFR BLD AUTO: 0.5 % (ref 0.3–6.2)
ERYTHROCYTE [DISTWIDTH] IN BLOOD BY AUTOMATED COUNT: 13.1 % (ref 12.3–15.4)
HCT VFR BLD AUTO: 38.8 % (ref 37.5–51)
HGB BLD-MCNC: 12.9 G/DL (ref 13–17.7)
IMM GRANULOCYTES # BLD AUTO: 0.25 10*3/MM3 (ref 0–0.05)
IMM GRANULOCYTES NFR BLD AUTO: 1.5 % (ref 0–0.5)
LYMPHOCYTES # BLD AUTO: 2.08 10*3/MM3 (ref 0.7–3.1)
LYMPHOCYTES NFR BLD AUTO: 12.1 % (ref 19.6–45.3)
MCH RBC QN AUTO: 34 PG (ref 26.6–33)
MCHC RBC AUTO-ENTMCNC: 33.2 G/DL (ref 31.5–35.7)
MCV RBC AUTO: 102.4 FL (ref 79–97)
MONOCYTES # BLD AUTO: 1.13 10*3/MM3 (ref 0.1–0.9)
MONOCYTES NFR BLD AUTO: 6.6 % (ref 5–12)
NEUTROPHILS NFR BLD AUTO: 13.65 10*3/MM3 (ref 1.7–7)
NEUTROPHILS NFR BLD AUTO: 79.1 % (ref 42.7–76)
PLATELET # BLD AUTO: 152 10*3/MM3 (ref 140–450)
PMV BLD AUTO: 9.7 FL (ref 6–12)
RBC # BLD AUTO: 3.79 10*6/MM3 (ref 4.14–5.8)
WBC NRBC COR # BLD: 17.23 10*3/MM3 (ref 3.4–10.8)

## 2023-05-15 PROCEDURE — 85025 COMPLETE CBC W/AUTO DIFF WBC: CPT

## 2023-05-15 PROCEDURE — 36415 COLL VENOUS BLD VENIPUNCTURE: CPT

## 2023-05-30 ENCOUNTER — LAB (OUTPATIENT)
Dept: LAB | Facility: HOSPITAL | Age: 63
End: 2023-05-30

## 2023-05-30 DIAGNOSIS — D84.9 DEFICIENCY SYNDROME, IMMUNOLOGIC: ICD-10-CM

## 2023-05-30 LAB
BASOPHILS # BLD AUTO: 0.08 10*3/MM3 (ref 0–0.2)
BASOPHILS NFR BLD AUTO: 0.5 % (ref 0–1.5)
DEPRECATED RDW RBC AUTO: 48.8 FL (ref 37–54)
EOSINOPHIL # BLD AUTO: 0.09 10*3/MM3 (ref 0–0.4)
EOSINOPHIL NFR BLD AUTO: 0.5 % (ref 0.3–6.2)
ERYTHROCYTE [DISTWIDTH] IN BLOOD BY AUTOMATED COUNT: 13.5 % (ref 12.3–15.4)
HCT VFR BLD AUTO: 39 % (ref 37.5–51)
HGB BLD-MCNC: 13.5 G/DL (ref 13–17.7)
IMM GRANULOCYTES # BLD AUTO: 0.71 10*3/MM3 (ref 0–0.05)
IMM GRANULOCYTES NFR BLD AUTO: 4.2 % (ref 0–0.5)
LYMPHOCYTES # BLD AUTO: 1.17 10*3/MM3 (ref 0.7–3.1)
LYMPHOCYTES NFR BLD AUTO: 6.9 % (ref 19.6–45.3)
MCH RBC QN AUTO: 34.4 PG (ref 26.6–33)
MCHC RBC AUTO-ENTMCNC: 34.6 G/DL (ref 31.5–35.7)
MCV RBC AUTO: 99.5 FL (ref 79–97)
MONOCYTES # BLD AUTO: 0.71 10*3/MM3 (ref 0.1–0.9)
MONOCYTES NFR BLD AUTO: 4.2 % (ref 5–12)
NEUTROPHILS NFR BLD AUTO: 14.14 10*3/MM3 (ref 1.7–7)
NEUTROPHILS NFR BLD AUTO: 83.7 % (ref 42.7–76)
NRBC BLD AUTO-RTO: 0 /100 WBC (ref 0–0.2)
PLAT MORPH BLD: NORMAL
PLATELET # BLD AUTO: 172 10*3/MM3 (ref 140–450)
PMV BLD AUTO: 10.9 FL (ref 6–12)
RBC # BLD AUTO: 3.92 10*6/MM3 (ref 4.14–5.8)
RBC MORPH BLD: NORMAL
WBC MORPH BLD: NORMAL
WBC NRBC COR # BLD: 16.9 10*3/MM3 (ref 3.4–10.8)

## 2023-05-30 PROCEDURE — 36415 COLL VENOUS BLD VENIPUNCTURE: CPT

## 2023-05-30 PROCEDURE — 85025 COMPLETE CBC W/AUTO DIFF WBC: CPT

## 2023-05-30 PROCEDURE — 85007 BL SMEAR W/DIFF WBC COUNT: CPT

## 2023-06-08 ENCOUNTER — TELEPHONE (OUTPATIENT)
Dept: FAMILY MEDICINE CLINIC | Age: 63
End: 2023-06-08

## 2023-06-08 NOTE — TELEPHONE ENCOUNTER
Caller: Chad Oneill    Relationship: Self    Best call back number: [phone]     What is the best time to reach you: ANYTIME     Who are you requesting to speak with (clinical staff, provider,  specific staff member): CLINICAL         What was the call regarding: PATIENT IS CALLING IN REGARDS TO NEXT LABS TO BE DRAWN STATED HE COMPLETED LABS EVERY TWO WEEKS, LAST VISIT 05/30/2023

## 2023-06-15 ENCOUNTER — LAB (OUTPATIENT)
Dept: LAB | Facility: HOSPITAL | Age: 63
End: 2023-06-15
Payer: MEDICARE

## 2023-06-15 ENCOUNTER — TELEPHONE (OUTPATIENT)
Dept: FAMILY MEDICINE CLINIC | Age: 63
End: 2023-06-15

## 2023-06-15 ENCOUNTER — CLINICAL SUPPORT (OUTPATIENT)
Dept: FAMILY MEDICINE CLINIC | Age: 63
End: 2023-06-15
Payer: MEDICARE

## 2023-06-15 VITALS — HEART RATE: 80 BPM | SYSTOLIC BLOOD PRESSURE: 152 MMHG | DIASTOLIC BLOOD PRESSURE: 91 MMHG

## 2023-06-15 DIAGNOSIS — D84.9 DEFICIENCY SYNDROME, IMMUNOLOGIC: ICD-10-CM

## 2023-06-15 LAB
BASOPHILS # BLD AUTO: 0.02 10*3/MM3 (ref 0–0.2)
BASOPHILS NFR BLD AUTO: 0.1 % (ref 0–1.5)
DEPRECATED RDW RBC AUTO: 46.2 FL (ref 37–54)
EOSINOPHIL # BLD AUTO: 0.01 10*3/MM3 (ref 0–0.4)
EOSINOPHIL NFR BLD AUTO: 0.1 % (ref 0.3–6.2)
ERYTHROCYTE [DISTWIDTH] IN BLOOD BY AUTOMATED COUNT: 12.7 % (ref 12.3–15.4)
HCT VFR BLD AUTO: 41.5 % (ref 37.5–51)
HGB BLD-MCNC: 14.3 G/DL (ref 13–17.7)
IMM GRANULOCYTES # BLD AUTO: 0.31 10*3/MM3 (ref 0–0.05)
IMM GRANULOCYTES NFR BLD AUTO: 2.1 % (ref 0–0.5)
LYMPHOCYTES # BLD AUTO: 1.11 10*3/MM3 (ref 0.7–3.1)
LYMPHOCYTES NFR BLD AUTO: 7.4 % (ref 19.6–45.3)
MCH RBC QN AUTO: 33.8 PG (ref 26.6–33)
MCHC RBC AUTO-ENTMCNC: 34.5 G/DL (ref 31.5–35.7)
MCV RBC AUTO: 98.1 FL (ref 79–97)
MONOCYTES # BLD AUTO: 0.34 10*3/MM3 (ref 0.1–0.9)
MONOCYTES NFR BLD AUTO: 2.3 % (ref 5–12)
NEUTROPHILS NFR BLD AUTO: 13.23 10*3/MM3 (ref 1.7–7)
NEUTROPHILS NFR BLD AUTO: 88 % (ref 42.7–76)
PLATELET # BLD AUTO: 165 10*3/MM3 (ref 140–450)
PMV BLD AUTO: 10.2 FL (ref 6–12)
RBC # BLD AUTO: 4.23 10*6/MM3 (ref 4.14–5.8)
WBC NRBC COR # BLD: 15.02 10*3/MM3 (ref 3.4–10.8)

## 2023-06-15 PROCEDURE — 85025 COMPLETE CBC W/AUTO DIFF WBC: CPT

## 2023-06-15 PROCEDURE — 36415 COLL VENOUS BLD VENIPUNCTURE: CPT

## 2023-06-15 RX ORDER — FAMOTIDINE 20 MG/1
20 TABLET, FILM COATED ORAL 2 TIMES DAILY
Qty: 180 TABLET | Refills: 0 | Status: SHIPPED | OUTPATIENT
Start: 2023-06-15

## 2023-06-15 NOTE — TELEPHONE ENCOUNTER
Pt states he was started on famotidine in the hospital in March, and is now out, pt states he was on omeprazole in the past and is fine with either medication, but is needing a refill, states he would like to take whichever medication you think is better for him

## 2023-07-22 DIAGNOSIS — I10 HYPERTENSION, ESSENTIAL: ICD-10-CM

## 2023-07-22 DIAGNOSIS — F41.9 ANXIETY: ICD-10-CM

## 2023-07-24 ENCOUNTER — HOSPITAL ENCOUNTER (OUTPATIENT)
Dept: CT IMAGING | Facility: HOSPITAL | Age: 63
Discharge: HOME | End: 2023-07-24
Admitting: NURSE PRACTITIONER
Payer: MEDICARE

## 2023-07-24 DIAGNOSIS — R91.1 LUNG NODULE: ICD-10-CM

## 2023-07-24 PROCEDURE — 71250 CT THORAX DX C-: CPT

## 2023-07-24 RX ORDER — ESCITALOPRAM OXALATE 5 MG/1
TABLET ORAL
Qty: 90 TABLET | Refills: 0 | Status: SHIPPED | OUTPATIENT
Start: 2023-07-24

## 2023-07-24 RX ORDER — AMLODIPINE BESYLATE 10 MG/1
TABLET ORAL
Qty: 90 TABLET | Refills: 0 | Status: SHIPPED | OUTPATIENT
Start: 2023-07-24

## 2023-08-11 DIAGNOSIS — I10 HYPERTENSION, ESSENTIAL: ICD-10-CM

## 2023-08-11 RX ORDER — DOXAZOSIN MESYLATE 4 MG/1
TABLET ORAL
Qty: 30 TABLET | Refills: 0 | Status: SHIPPED | OUTPATIENT
Start: 2023-08-11

## 2023-08-11 NOTE — TELEPHONE ENCOUNTER
Rx Refill Note  Requested Prescriptions     Pending Prescriptions Disp Refills    doxazosin (CARDURA) 4 MG tablet [Pharmacy Med Name: Doxazosin Mesylate 4 MG Oral Tablet] 30 tablet 0     Sig: TAKE 1 TABLET BY MOUTH ONCE DAILY AT NIGHT      Last office visit with prescribing clinician: 4/11/2023       Next office visit with prescribing clinician: 10/12/2023    Last filled 4/11/23 #30 2 refills     Roxana Velez LPN  08/11/23, 15:14 EDT

## 2023-09-01 ENCOUNTER — OFFICE VISIT (OUTPATIENT)
Dept: FAMILY MEDICINE CLINIC | Age: 63
End: 2023-09-01
Payer: MEDICARE

## 2023-09-01 VITALS
HEART RATE: 95 BPM | TEMPERATURE: 99.3 F | OXYGEN SATURATION: 95 % | BODY MASS INDEX: 29.03 KG/M2 | SYSTOLIC BLOOD PRESSURE: 124 MMHG | HEIGHT: 69 IN | WEIGHT: 196 LBS | DIASTOLIC BLOOD PRESSURE: 74 MMHG

## 2023-09-01 DIAGNOSIS — R07.89 CHEST DISCOMFORT: Primary | ICD-10-CM

## 2023-09-01 DIAGNOSIS — R06.02 SHORTNESS OF BREATH: ICD-10-CM

## 2023-09-01 DIAGNOSIS — N18.4 STAGE 4 CHRONIC KIDNEY DISEASE: ICD-10-CM

## 2023-09-01 DIAGNOSIS — R42 DIZZINESS: ICD-10-CM

## 2023-09-01 PROCEDURE — 99214 OFFICE O/P EST MOD 30 MIN: CPT

## 2023-09-01 PROCEDURE — 1159F MED LIST DOCD IN RCRD: CPT

## 2023-09-01 PROCEDURE — 93000 ELECTROCARDIOGRAM COMPLETE: CPT

## 2023-09-01 PROCEDURE — 3078F DIAST BP <80 MM HG: CPT

## 2023-09-01 PROCEDURE — 1160F RVW MEDS BY RX/DR IN RCRD: CPT

## 2023-09-01 PROCEDURE — 3074F SYST BP LT 130 MM HG: CPT

## 2023-09-01 NOTE — PROGRESS NOTES
"Subjective     CHIEF COMPLAINT    Chief Complaint   Patient presents with    Belching     Diarrhea, was having chest pain about 7-8 days ago. Took some acid reflux meds and it made him feel better.      History of Present Illness  Patient is a 63 year old male, presents to the clinic today with complaints of chest pain, SOB, fatigue, dizziness.  He describes a staggering feeling when he gets out of bed.  He thought his chest pain was related to acid reflux and it did get a little better after taking medications for acid reflux.  He describes it as a pain located in the center of his chest that radiated to the left.  States it felt like a hole in his chest.  He was having the symptoms daily.  He notes shortness of breath particularly with exertion.  He states he is not taking his blood pressure medicine for an unknown reason at this time.  He also notes pain rating to his back.  He also states he was recently placed on steroids because he is in \"kidney failure.\"  States that everything went downhill when he stopped taking steroids.    Review of Systems   Constitutional:  Positive for fatigue.   Respiratory:  Positive for shortness of breath. Negative for wheezing.    Cardiovascular:  Positive for chest pain.   Musculoskeletal:  Positive for arthralgias and myalgias.   Neurological:  Positive for dizziness.     Allergies   Allergen Reactions    Benadryl [Diphenhydramine] Palpitations     Current Outpatient Medications on File Prior to Visit   Medication Sig Dispense Refill    albuterol sulfate  (90 Base) MCG/ACT inhaler Inhale 2 puffs Every 4 (Four) Hours As Needed for Wheezing. 8 g 0    amLODIPine (NORVASC) 10 MG tablet Take 1 tablet by mouth once daily 90 tablet 0    Budeson-Glycopyrrol-Formoterol (BREZTRI) 160-9-4.8 MCG/ACT aerosol inhaler Inhale 2 puffs 2 (Two) Times a Day. 10.7 g 0    doxazosin (CARDURA) 4 MG tablet TAKE 1 TABLET BY MOUTH ONCE DAILY AT NIGHT 30 tablet 0    escitalopram (LEXAPRO) 5 MG " "tablet Take 1 tablet by mouth once daily 90 tablet 0    famotidine (PEPCID) 20 MG tablet Take 1 tablet by mouth 2 (Two) Times a Day. (Patient not taking: Reported on 9/1/2023) 180 tablet 0     No current facility-administered medications on file prior to visit.     /74 (BP Location: Right arm, Patient Position: Sitting, Cuff Size: Adult)   Pulse 95   Temp 99.3 øF (37.4 øC) (Oral)   Ht 175.3 cm (69.02\")   Wt 88.9 kg (196 lb)   SpO2 95%   BMI 28.93 kg/mý     Objective     Physical Exam  Vitals and nursing note reviewed.   Constitutional:       General: He is not in acute distress.     Appearance: Normal appearance. He is not ill-appearing.   HENT:      Head: Normocephalic.      Nose: Nose normal.      Mouth/Throat:      Lips: Pink.   Eyes:      Extraocular Movements: Extraocular movements intact.   Cardiovascular:      Rate and Rhythm: Normal rate and regular rhythm.      Heart sounds: Murmur heard.   Pulmonary:      Effort: Pulmonary effort is normal. No accessory muscle usage or respiratory distress.      Breath sounds: Normal breath sounds. No wheezing or rhonchi.   Musculoskeletal:      Cervical back: Normal range of motion. No rigidity. Normal range of motion.   Skin:     General: Skin is warm and dry.      Capillary Refill: Capillary refill takes less than 2 seconds.   Neurological:      General: No focal deficit present.      Mental Status: He is alert and oriented to person, place, and time.      Cranial Nerves: No facial asymmetry.      Sensory: Sensation is intact.      Motor: No weakness.      Gait: Gait is intact.   Psychiatric:         Mood and Affect: Mood and affect normal.         Behavior: Behavior normal.           Diagnoses and all orders for this visit:    1. Chest discomfort (Primary)  -     ECG 12 Lead    2. Shortness of breath    3. Dizziness    4. Stage 4 chronic kidney disease      EKG completed in office and reviewed with on-call provider.  No acute findings noted.  I have " concerns today regarding his symptom profile including chest pain, shortness of breath, dizziness and his medical history.  I feel that he needs further work-up with labs and imaging that cannot be completed in a timely manner as an outpatient.  Recommend he proceed to the ER for further work-up and evaluation.  Patient is agreeable to plan.  States that he will proceed to  [facility] for further work-up and management.  Patient declines EMS and is aware of risks.  I did recommend he have someone drive him to the ER but he declines.      Follow-up:  No follow-ups on file.  Patient was given instructions and counseling regarding his condition or for health maintenance advice. Please see specific information pulled into the AVS if appropriate.

## 2023-09-03 NOTE — PATIENT INSTRUCTIONS
Chronic Obstructive Pulmonary Disease Exacerbation    Chronic obstructive pulmonary disease (COPD) is a long-term (chronic) condition that affects the lungs. COPD is a general term that can be used to describe many different lung problems that cause lung inflammation and limit airflow, including chronic bronchitis and emphysema. COPD exacerbations are episodes when breathing symptoms flare up, become much worse, and require extra treatment.  COPD exacerbations are usually caused by infections. Without treatment, COPD exacerbations can be severe and even life threatening. Frequent COPD exacerbations can cause further damage to the lungs.  What are the causes?  This condition may be caused by:  Respiratory infections, including viral and bacterial infections.  Exposure to smoke.  Exposure to air pollution, chemical fumes, or dust.  Things that can cause an allergic reaction (allergens).  Not taking your usual COPD medicines as directed.  Underlying medical problems, such as congestive heart failure or infections not involving the lungs.  In many cases, the cause of this condition is not known.  What increases the risk?  The following factors may make you more likely to develop this condition:  Smoking cigarettes.  Being an older adult.  Having frequent prior COPD exacerbations.  What are the signs or symptoms?  Symptoms of this condition include:  Increased coughing.  Increased production of mucus from your lungs.  Increased wheezing and shortness of breath.  Rapid or labored breathing.  Chest tightness.  Less energy than usual.  Sleep disruption from symptoms.  Confusion  Increased sleepiness.  Often, these symptoms happen or get worse even with the use of medicines.  How is this diagnosed?  This condition is diagnosed based on:  Your medical history.  A physical exam.  You may also have tests, including:  A chest X-ray.  Blood tests.  Lung (pulmonary) function tests.  How is this treated?  Treatment for this  condition depends on the severity and cause of the symptoms. You may need to be admitted to a hospital for treatment. Some of the treatments commonly used to treat COPD exacerbations are:  Antibiotic medicines. These may be used for severe exacerbations caused by a lung infection, such as pneumonia.  Bronchodilators. These are inhaled medicines that expand the air passages and allow increased airflow. They may make your breathing more comfortable.  Steroid medicines. These act to reduce inflammation in the airways. They may be given with an inhaler, taken by mouth, or given through an IV tube inserted into one of your veins.  Supplemental oxygen therapy.  Airway clearing techniques, such as noninvasive ventilation (NIV) and positive expiratory pressure (PEP). These provide respiratory support through a mask or other noninvasive device. An example of this would be using a continuous positive airway pressure (CPAP) machine to improve delivery of oxygen into your lungs.  Follow these instructions at home:  Medicines  Take over-the-counter and prescription medicines only as told by your health care provider.  It is important to use correct technique with inhaled medicines.  If you were prescribed an antibiotic medicine or oral steroid, take it as told by your health care provider. Do not stop taking the medicine even if you start to feel better.  Lifestyle  Do not use any products that contain nicotine or tobacco. These products include cigarettes, chewing tobacco, and vaping devices, such as e-cigarettes. If you need help quitting, ask your health care provider.  Eat a healthy diet.  Exercise regularly.  Get enough sleep. Most adults need 7 or more hours per night.  Avoid exposure to all substances that irritate the airway, especially tobacco smoke.  Regularly wash your hands with soap and water for at least 20 seconds. If soap and water are not available, use hand . This may help prevent you from getting  infections.  During flu season, avoid enclosed spaces that are crowded with people.  General instructions  Drink enough fluid to keep your urine pale yellow, unless you have a medical condition that requires fluid restriction.  Use a cool mist vaporizer. This humidifies the air and makes it easier for you to clear your chest when you cough.  If you have a home nebulizer and oxygen, continue to use them as told by your health care provider.  Keep all follow-up visits. This is important.  How is this prevented?  Stay up-to-date on pneumococcal and flu (influenza) vaccines. A flu shot is recommended every year to help prevent exacerbations.  Quitting smoking is very important in preventing COPD from getting worse and in preventing exacerbations from happening as often.  Follow all instructions for pulmonary rehabilitation after a recent exacerbation. This can help prevent future exacerbations.  Work with your health care provider to develop and follow an action plan. This tells you what steps to take when you experience certain symptoms.  Contact a health care provider if:  You have a worsening of your regular COPD symptoms.  Get help right away if:  You have worsening shortness of breath, even when resting.  You have trouble talking.  You have severe chest pain.  You cough up blood.  You have a fever.  You have weakness, vomit repeatedly, or faint.  You feel confused.  You are not able to sleep because of your symptoms.  You have trouble doing daily activities.  These symptoms may represent a serious problem that is an emergency. Do not wait to see if the symptoms will go away. Get medical help right away. Call your local emergency services (911 in the [place] Do not drive yourself to the hospital.  Summary  COPD exacerbations are episodes when breathing symptoms become much worse and require extra treatment above your normal treatment.  Exacerbations can be severe and even life threatening. Frequent COPD exacerbations  can cause further damage to your lungs.  COPD exacerbations are usually triggered by infections such as the flu, colds, and even pneumonia.  Treatment for this condition depends on the severity and cause of the symptoms. You may need to be admitted to a hospital for treatment.  Quitting smoking is very important to prevent COPD from getting worse and to prevent exacerbations from happening as often.  This information is not intended to replace advice given to you by your health care provider. Make sure you discuss any questions you have with your health care provider.  Document Revised: 10/26/2021 Document Reviewed: 10/26/2021  Elsevier Patient Education © 2023 [employer]

## 2023-09-03 NOTE — PROGRESS NOTES
Primary Care Provider  Kuldeep Winters MD     Referring Provider  No ref. provider found     Chief Complaint  Emphysema, Lung Nodule, Cough, Shortness of Breath, Wheezing, and Follow-up (8 month follow up)    Subjective          History of Presenting Illness  Patient is a 63-year-old male, patient of Dr. Le's who presents for management of emphysema and lung nodule who presents for follow-up visit today.  Patient states that since last visit his breathing is at baseline.  Patient states that he does get short of breath that is worse with exertion, mild to moderate in severity, and improved with rest.  Patient states that he is taking Breztri every day as prescribed and uses albuterol inhaler as needed. Since last office visit patient had chest CT scan completed on 7/25/2023.  Report states stable 5 mm left lower lobe nodule since 07/2020 compatible with a benign etiology.  No further follow-up is necessary  Calcific coronary atherosclerosis.  Aortic valve calcification which can be associated with valvular aortic stenosis patient states that he is under the care of cardiologist, Dr. Lopes and is scheduled to follow-up with him on 10/31/2023.  Patient denies fever, chills, night sweats, swollen glands in the head and neck, unintentional weight loss, hemoptysis, purulent sputum production, dysphagia, chest pain, palpitations, chest tightness, abdominal pain, nausea, vomiting, and diarrhea.  Patient also denies any myalgias, changes in sense of taste and/or smell, sore throat, any other coronavirus or flu-like symptoms.  Patient denies any leg swelling, orthopnea, paroxysmal nocturnal dyspnea.  Patient is able to perform activities of daily living.        Review of Systems   Constitutional:  Negative for activity change, appetite change, chills, diaphoresis, fatigue, fever, unexpected weight gain and unexpected weight loss.        Negative for Insomnia   HENT:  Negative for congestion (Nasal), mouth sores,  nosebleeds, postnasal drip, sore throat, swollen glands and trouble swallowing.         Negative for Thrush  Negative for Hoarseness  Negative for Allergies/Hay Fever  Negative for Recent Head injury  Negative for Ear Fullness  Negative for Nasal or Sinus pain  Negative for Dry lips  Negative for Nasal discharge   Respiratory:  Positive for shortness of breath. Negative for apnea, cough, chest tightness and wheezing.         Negative for Hemoptysis  Negative for Pleuritic pain   Cardiovascular:  Negative for chest pain, palpitations and leg swelling.        Negative for Claudication  Negative for Cyanosis  Negative for Dyspnea on exertion   Gastrointestinal:  Negative for abdominal pain, diarrhea, nausea, vomiting and GERD.   Musculoskeletal:  Negative for joint swelling and myalgias.        Negative for Joint pain  Negative for Joint stiffness   Skin:  Negative for color change, dry skin, pallor and rash.   Neurological:  Negative for syncope, weakness and headache.   Hematological:  Negative for adenopathy. Does not bruise/bleed easily.      Family History   Problem Relation Age of Onset    Coronary artery disease Mother     Pulmonary embolism Mother         POST OP    Heart attack Brother     Prostate cancer Brother     COPD Brother     Alcohol abuse Maternal Grandfather         Social History     Socioeconomic History    Marital status:     Number of children: 2   Tobacco Use    Smoking status: Former     Packs/day: 0.25     Years: 30.00     Pack years: 7.50     Types: Cigarettes     Start date:      Quit date:      Years since quittin.7    Smokeless tobacco: Former     Types: Chew   Vaping Use    Vaping Use: Never used   Substance and Sexual Activity    Alcohol use: Not Currently     Comment: non-drinker    Drug use: Never     Comment: none    Sexual activity: Defer        Past Medical History:   Diagnosis Date    Acute bronchitis, unspecified     Acute upper respiratory infection,  unspecified     Anxiety disorder, unspecified     Anxiety with depression     Chronic kidney disease, stage III (moderate)     Colon polyp     Cough     Dizziness and giddiness     Emphysema, unspecified     Fatigue     GERD (gastroesophageal reflux disease)     Heartburn     Hypertension     Mild intermittent acute bronchitis with asthma with acute exacerbation     Nonrheumatic aortic (valve) stenosis     Vision problem         Immunization History   Administered Date(s) Administered    COVID-19 (PFIZER) BIVALENT 12+YRS 10/10/2022    COVID-19 (PFIZER) Purple Cap Monovalent 04/01/2021, 04/22/2021    Covid-19 (Pfizer) Gray Cap Monovalent 04/08/2022    Flu Vaccine Intradermal Quad 18-64YR 01/03/2013    Flu Vaccine Quad PF >36MO 11/27/2015    Fluzone (or Fluarix & Flulaval for VFC) >6mos 09/20/2021, 10/10/2022, 09/21/2023    Influenza Quad Vaccine (Inpatient) 11/01/2013    Influenza, Unspecified 09/24/2020    PPD Test 04/24/2015    Pneumococcal Conjugate 20-Valent (PCV20) 01/16/2023    Pneumococcal Polysaccharide (PPSV23) 02/24/2020    Tdap 09/20/2021       Allergies   Allergen Reactions    Benadryl [Diphenhydramine] Palpitations          Current Outpatient Medications:     albuterol sulfate  (90 Base) MCG/ACT inhaler, Inhale 2 puffs Every 4 (Four) Hours As Needed for Wheezing or Shortness of Air., Disp: 18 g, Rfl: 11    amLODIPine (NORVASC) 10 MG tablet, Take 1 tablet by mouth once daily, Disp: 90 tablet, Rfl: 0    Budeson-Glycopyrrol-Formoterol (BREZTRI) 160-9-4.8 MCG/ACT aerosol inhaler, Inhale 2 puffs 2 (Two) Times a Day for 30 days., Disp: 10.7 g, Rfl: 11    doxazosin (CARDURA) 4 MG tablet, TAKE 1 TABLET BY MOUTH ONCE DAILY AT NIGHT, Disp: 30 tablet, Rfl: 0    escitalopram (LEXAPRO) 5 MG tablet, Take 1 tablet by mouth once daily, Disp: 90 tablet, Rfl: 0    losartan (COZAAR) 25 MG tablet, Take 1 tablet by mouth., Disp: , Rfl:     famotidine (PEPCID) 20 MG tablet, Take 1 tablet by mouth 2 (Two) Times a Day.  "(Patient not taking: Reported on 9/21/2023), Disp: 180 tablet, Rfl: 0    sulfamethoxazole-trimethoprim (BACTRIM DS,SEPTRA DS) 800-160 MG per tablet, Take 1 tablet by mouth Every 12 (Twelve) Hours. (Patient not taking: Reported on 9/21/2023), Disp: , Rfl:      Objective     Physical Exam  Vital Signs:   WDWN, Alert, NAD.    HEENT:  PERRL, EOMI.  OP, nares clear, no sinus tenderness  Neck:  Supple, no JVD, no thyromegaly.  Lymph: no axillary, cervical, supraclavicular lymphadenopathy noted bilaterally  Chest:  good aeration, clear to auscultation bilaterally, tympanic to percussion bilaterally, no work of breathing noted  CV: RRR, no MGR, pulses 2+, equal.  Abd:  Soft, NT, ND, + BS, no HSM  EXT:  no clubbing, no cyanosis, no edema, no joint tenderness  Neuro:  A&Ox3, CN grossly intact, no focal deficits.  Skin: No rashes or lesions noted.    /72 (BP Location: Left arm, Patient Position: Sitting, Cuff Size: Large Adult)   Pulse 107   Resp 16   Ht 175.3 cm (69.02\")   Wt 84.7 kg (186 lb 12.8 oz)   SpO2 97%   BMI 27.57 kg/m²         Result Review :   I have reviewed Dr. Le's last office visit note.  I also reviewed chest CT report dated from 7/25/2023.  See scanned report.    Procedures:      CT Chest Without Contrast Diagnostic    Result Date: 7/25/2023     1. Stable 5 mm left lower lobe nodule since 07/2020 compatible with a benign etiology.  No further follow-up is necessary  2. Calcific coronary atherosclerosis  3. Aortic valve calcification which can be associated with valvular aortic stenosis      FLY IYRE MD       Electronically Signed and Approved By: FLY IYER MD on 7/25/2023 at 9:09                 Assessment and Plan      Assessment:  1.  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   2. Lung nodule. 5 mm solitary lung nodule right upper lobe.  Stable since 07/2020 with no further follow up necessary per recent chest CT report completed on 7/25/2023.  3.  Dyspnea. "        4.  Emphysema.  5.  Peripheral eosinophilia.    6.  Tobacco abuse with cigarettes in remission.  Not eligible for lung cancer screening as patient reports that he quit smoking in 2005.      Plan:  1.  Continue Breztri as prescribed and rinse mouth out after each use.  2.  Continue albuterol inhaler as needed.     3.  Patient is advised to avoid burning wood.  Discussed with patient that exposure to wood-burning smoke can cause asthma attacks and bronchitis and also can aggravate heart and lung disease.  4.  Lung nodule stable since July 2020 with no further follow-up necessary per recent chest CT report.  5.  Alpha-1 antitrypsin level and genotype drawn in the office today.  6.  Vaccination status: He is up-to-date with pneumonia and Covid vaccines.  Our office is currently out of the flu vaccination.  Patient is advised receive the flu vaccination through his primary care provider or his pharmacy.  Patient is advised to continue to follow CDC recommendations such as social distancing, wearing a mask, and washing hands for least 20 seconds.  7.  Smoking status: patient is a former cigarette smoker.  Not eligible for lung cancer screening as patient reports that he quit smoking in 2005.  8.  Patient to call the office, 911, or go to the ER with new or worsening symptoms.  9.  Follow-up in 4 months, sooner if needed.          Follow Up   Return in about 4 months (around 1/21/2024) for in [place].  Patient was given instructions and counseling regarding his condition or for health maintenance advice. Please see specific information pulled into the AVS if appropriate.

## 2023-09-05 NOTE — PROGRESS NOTES
Procedure     ECG 12 Lead    Date/Time: 9/5/2023 7:50 AM  Performed by: Kuldeep Winters MD  Authorized by: Dione Lopes APRN   Comparison: compared with previous ECG from 3/15/2023  Similar to previous ECG  Rhythm: sinus rhythm  Rate: normal  Conduction: conduction normal  ST Elevation: V1-V6  T Waves: T waves normal  QRS axis: normal    Clinical impression: abnormal EKG

## 2023-09-21 ENCOUNTER — OFFICE VISIT (OUTPATIENT)
Dept: PULMONOLOGY | Facility: CLINIC | Age: 63
End: 2023-09-21
Payer: MEDICARE

## 2023-09-21 ENCOUNTER — CLINICAL SUPPORT (OUTPATIENT)
Dept: FAMILY MEDICINE CLINIC | Age: 63
End: 2023-09-21
Payer: MEDICARE

## 2023-09-21 VITALS
HEIGHT: 69 IN | HEART RATE: 107 BPM | DIASTOLIC BLOOD PRESSURE: 72 MMHG | OXYGEN SATURATION: 97 % | SYSTOLIC BLOOD PRESSURE: 115 MMHG | WEIGHT: 186.8 LBS | RESPIRATION RATE: 16 BRPM | BODY MASS INDEX: 27.67 KG/M2

## 2023-09-21 DIAGNOSIS — J44.9 ASTHMA-COPD OVERLAP SYNDROME: ICD-10-CM

## 2023-09-21 DIAGNOSIS — R91.1 LUNG NODULE: ICD-10-CM

## 2023-09-21 DIAGNOSIS — J44.9 CHRONIC OBSTRUCTIVE PULMONARY DISEASE, UNSPECIFIED COPD TYPE: ICD-10-CM

## 2023-09-21 DIAGNOSIS — F17.201 TOBACCO ABUSE, IN REMISSION: Primary | ICD-10-CM

## 2023-09-21 DIAGNOSIS — J43.9 PULMONARY EMPHYSEMA, UNSPECIFIED EMPHYSEMA TYPE: ICD-10-CM

## 2023-09-21 DIAGNOSIS — Z23 NEED FOR INFLUENZA VACCINATION: Primary | ICD-10-CM

## 2023-09-21 DIAGNOSIS — R06.00 DYSPNEA, UNSPECIFIED TYPE: ICD-10-CM

## 2023-09-21 DIAGNOSIS — D72.19 PERIPHERAL EOSINOPHILIA: ICD-10-CM

## 2023-09-21 PROBLEM — J44.89 ASTHMA-COPD OVERLAP SYNDROME: Status: ACTIVE | Noted: 2023-09-21

## 2023-09-21 PROCEDURE — G0008 ADMIN INFLUENZA VIRUS VAC: HCPCS | Performed by: FAMILY MEDICINE

## 2023-09-21 PROCEDURE — 90686 IIV4 VACC NO PRSV 0.5 ML IM: CPT | Performed by: FAMILY MEDICINE

## 2023-09-21 RX ORDER — ALBUTEROL SULFATE 90 UG/1
2 AEROSOL, METERED RESPIRATORY (INHALATION) EVERY 4 HOURS PRN
Qty: 18 G | Refills: 11 | Status: SHIPPED | OUTPATIENT
Start: 2023-09-21

## 2023-09-21 RX ORDER — SULFAMETHOXAZOLE AND TRIMETHOPRIM 800; 160 MG/1; MG/1
1 TABLET ORAL EVERY 12 HOURS SCHEDULED
COMMUNITY
Start: 2023-09-01

## 2023-09-21 RX ORDER — LOSARTAN POTASSIUM 25 MG/1
25 TABLET ORAL
COMMUNITY
Start: 2023-09-08 | End: 2024-09-07

## 2023-09-30 DIAGNOSIS — I10 HYPERTENSION, ESSENTIAL: ICD-10-CM

## 2023-09-30 DIAGNOSIS — J43.9 PULMONARY EMPHYSEMA, UNSPECIFIED EMPHYSEMA TYPE: ICD-10-CM

## 2023-10-02 RX ORDER — BUDESONIDE, GLYCOPYRROLATE, AND FORMOTEROL FUMARATE 160; 9; 4.8 UG/1; UG/1; UG/1
AEROSOL, METERED RESPIRATORY (INHALATION)
Qty: 11 G | Refills: 6 | Status: SHIPPED | OUTPATIENT
Start: 2023-10-02

## 2023-10-02 RX ORDER — DOXAZOSIN MESYLATE 4 MG/1
TABLET ORAL
Qty: 30 TABLET | Refills: 0 | Status: SHIPPED | OUTPATIENT
Start: 2023-10-02

## 2023-10-03 ENCOUNTER — TRANSCRIBE ORDERS (OUTPATIENT)
Dept: ADMINISTRATIVE | Facility: HOSPITAL | Age: 63
End: 2023-10-03
Payer: MEDICARE

## 2023-10-03 ENCOUNTER — LAB (OUTPATIENT)
Dept: LAB | Facility: HOSPITAL | Age: 63
End: 2023-10-03
Payer: MEDICARE

## 2023-10-03 DIAGNOSIS — N18.4 CHRONIC KIDNEY DISEASE, STAGE IV (SEVERE): Primary | ICD-10-CM

## 2023-10-03 DIAGNOSIS — N18.4 CHRONIC KIDNEY DISEASE, STAGE IV (SEVERE): ICD-10-CM

## 2023-10-03 LAB
ANION GAP SERPL CALCULATED.3IONS-SCNC: 12.4 MMOL/L (ref 5–15)
BACTERIA UR QL AUTO: ABNORMAL /HPF
BASOPHILS # BLD AUTO: 0.04 10*3/MM3 (ref 0–0.2)
BASOPHILS NFR BLD AUTO: 0.3 % (ref 0–1.5)
BILIRUB UR QL STRIP: ABNORMAL
BUN SERPL-MCNC: 11 MG/DL (ref 8–23)
BUN/CREAT SERPL: 5.9 (ref 7–25)
CALCIUM SPEC-SCNC: 9.7 MG/DL (ref 8.6–10.5)
CHLORIDE SERPL-SCNC: 102 MMOL/L (ref 98–107)
CLARITY UR: ABNORMAL
CO2 SERPL-SCNC: 25.6 MMOL/L (ref 22–29)
COLOR UR: ABNORMAL
CREAT SERPL-MCNC: 1.87 MG/DL (ref 0.76–1.27)
CREAT UR-MCNC: 573.5 MG/DL
DEPRECATED RDW RBC AUTO: 45.2 FL (ref 37–54)
EGFRCR SERPLBLD CKD-EPI 2021: 39.7 ML/MIN/1.73
EOSINOPHIL # BLD AUTO: 0.08 10*3/MM3 (ref 0–0.4)
EOSINOPHIL NFR BLD AUTO: 0.7 % (ref 0.3–6.2)
ERYTHROCYTE [DISTWIDTH] IN BLOOD BY AUTOMATED COUNT: 13.2 % (ref 12.3–15.4)
GLUCOSE SERPL-MCNC: 124 MG/DL (ref 65–99)
GLUCOSE UR STRIP-MCNC: NEGATIVE MG/DL
HCT VFR BLD AUTO: 34.4 % (ref 37.5–51)
HGB BLD-MCNC: 11.9 G/DL (ref 13–17.7)
HGB UR QL STRIP.AUTO: ABNORMAL
HYALINE CASTS UR QL AUTO: ABNORMAL /LPF
IMM GRANULOCYTES # BLD AUTO: 0.09 10*3/MM3 (ref 0–0.05)
IMM GRANULOCYTES NFR BLD AUTO: 0.8 % (ref 0–0.5)
KETONES UR QL STRIP: ABNORMAL
LEUKOCYTE ESTERASE UR QL STRIP.AUTO: NEGATIVE
LYMPHOCYTES # BLD AUTO: 1.13 10*3/MM3 (ref 0.7–3.1)
LYMPHOCYTES NFR BLD AUTO: 9.6 % (ref 19.6–45.3)
MCH RBC QN AUTO: 32.4 PG (ref 26.6–33)
MCHC RBC AUTO-ENTMCNC: 34.6 G/DL (ref 31.5–35.7)
MCV RBC AUTO: 93.7 FL (ref 79–97)
MONOCYTES # BLD AUTO: 0.78 10*3/MM3 (ref 0.1–0.9)
MONOCYTES NFR BLD AUTO: 6.6 % (ref 5–12)
MUCOUS THREADS URNS QL MICRO: ABNORMAL /HPF
NEUTROPHILS NFR BLD AUTO: 82 % (ref 42.7–76)
NEUTROPHILS NFR BLD AUTO: 9.68 10*3/MM3 (ref 1.7–7)
NITRITE UR QL STRIP: NEGATIVE
PH UR STRIP.AUTO: <=5 [PH] (ref 5–8)
PHOSPHATE SERPL-MCNC: 2.6 MG/DL (ref 2.5–4.5)
PLATELET # BLD AUTO: 220 10*3/MM3 (ref 140–450)
PMV BLD AUTO: 9.3 FL (ref 6–12)
POTASSIUM SERPL-SCNC: 3.3 MMOL/L (ref 3.5–5.2)
PROT ?TM UR-MCNC: 67.6 MG/DL
PROT UR QL STRIP: ABNORMAL
PROT/CREAT UR: 0.12 MG/G{CREAT}
PTH-INTACT SERPL-MCNC: 43.6 PG/ML (ref 15–65)
RBC # BLD AUTO: 3.67 10*6/MM3 (ref 4.14–5.8)
RBC # UR STRIP: ABNORMAL /HPF
REF LAB TEST METHOD: ABNORMAL
SODIUM SERPL-SCNC: 140 MMOL/L (ref 136–145)
SP GR UR STRIP: >=1.03 (ref 1–1.03)
SPERM URNS QL MICRO: ABNORMAL /HPF
SQUAMOUS #/AREA URNS HPF: ABNORMAL /HPF
UROBILINOGEN UR QL STRIP: ABNORMAL
WBC # UR STRIP: ABNORMAL /HPF
WBC NRBC COR # BLD: 11.8 10*3/MM3 (ref 3.4–10.8)

## 2023-10-03 PROCEDURE — 82570 ASSAY OF URINE CREATININE: CPT

## 2023-10-03 PROCEDURE — 85025 COMPLETE CBC W/AUTO DIFF WBC: CPT

## 2023-10-03 PROCEDURE — 84100 ASSAY OF PHOSPHORUS: CPT

## 2023-10-03 PROCEDURE — 80048 BASIC METABOLIC PNL TOTAL CA: CPT

## 2023-10-03 PROCEDURE — 83970 ASSAY OF PARATHORMONE: CPT

## 2023-10-03 PROCEDURE — 84156 ASSAY OF PROTEIN URINE: CPT

## 2023-10-03 PROCEDURE — 36415 COLL VENOUS BLD VENIPUNCTURE: CPT

## 2023-10-03 PROCEDURE — 81001 URINALYSIS AUTO W/SCOPE: CPT

## 2023-10-23 ENCOUNTER — TELEPHONE (OUTPATIENT)
Dept: FAMILY MEDICINE CLINIC | Age: 63
End: 2023-10-23

## 2023-10-23 ENCOUNTER — OFFICE VISIT (OUTPATIENT)
Dept: FAMILY MEDICINE CLINIC | Age: 63
End: 2023-10-23
Payer: MEDICARE

## 2023-10-23 VITALS
DIASTOLIC BLOOD PRESSURE: 81 MMHG | HEART RATE: 99 BPM | HEIGHT: 69 IN | TEMPERATURE: 98.1 F | WEIGHT: 184 LBS | SYSTOLIC BLOOD PRESSURE: 114 MMHG | BODY MASS INDEX: 27.25 KG/M2

## 2023-10-23 DIAGNOSIS — Z00.00 MEDICARE ANNUAL WELLNESS VISIT, SUBSEQUENT: Primary | ICD-10-CM

## 2023-10-23 DIAGNOSIS — E66.3 OVERWEIGHT (BMI 25.0-29.9): ICD-10-CM

## 2023-10-23 DIAGNOSIS — Z23 NEED FOR VACCINATION: ICD-10-CM

## 2023-10-23 DIAGNOSIS — J43.9 PULMONARY EMPHYSEMA, UNSPECIFIED EMPHYSEMA TYPE: ICD-10-CM

## 2023-10-23 DIAGNOSIS — I35.0 NONRHEUMATIC AORTIC VALVE STENOSIS: ICD-10-CM

## 2023-10-23 DIAGNOSIS — F41.9 ANXIETY: ICD-10-CM

## 2023-10-23 DIAGNOSIS — N18.4 STAGE 4 CHRONIC KIDNEY DISEASE: ICD-10-CM

## 2023-10-23 DIAGNOSIS — I10 HYPERTENSION, ESSENTIAL: ICD-10-CM

## 2023-10-23 DIAGNOSIS — R12 HEARTBURN: ICD-10-CM

## 2023-10-23 RX ORDER — OMEPRAZOLE 40 MG/1
40 CAPSULE, DELAYED RELEASE ORAL DAILY
Qty: 30 CAPSULE | Refills: 1 | Status: SHIPPED | OUTPATIENT
Start: 2023-10-23 | End: 2023-10-28 | Stop reason: SDUPTHER

## 2023-10-23 NOTE — PROGRESS NOTES
The ABCs of the Annual Wellness Visit  Subsequent Medicare Wellness Visit    Subjective    Chad Oneill is a 63 y.o. male who presents for a Subsequent Medicare Wellness Visit.    The following portions of the patient's history were reviewed and   updated as appropriate: allergies, current medications, past family history, past medical history, past social history, past surgical history, and problem list.    Compared to one year ago, the patient feels his physical   health is the same.    Compared to one year ago, the patient feels his mental   health is the same.    Recent Hospitalizations:  This patient has had a [facility] admission record on file within the last 365 days.    Current Medical Providers:  Patient Care Team:  Kuldeep Winters MD as PCP - General (Family Medicine)    Outpatient Medications Prior to Visit   Medication Sig Dispense Refill    albuterol sulfate  (90 Base) MCG/ACT inhaler Inhale 2 puffs Every 4 (Four) Hours As Needed for Wheezing or Shortness of Air. 18 g 11    amLODIPine (NORVASC) 10 MG tablet Take 1 tablet by mouth once daily 90 tablet 0    Breztri Aerosphere 160-9-4.8 MCG/ACT aerosol inhaler INAHLE 2 PUFFS BY MOUTH TWICE DAILY RINSE MOUTH  OUT  AFTER  EACH  USE. 11 g 6    doxazosin (CARDURA) 4 MG tablet TAKE 1 TABLET BY MOUTH ONCE DAILY AT NIGHT 30 tablet 0    losartan (COZAAR) 25 MG tablet Take 1 tablet by mouth.      escitalopram (LEXAPRO) 5 MG tablet Take 1 tablet by mouth once daily 90 tablet 0    famotidine (PEPCID) 20 MG tablet Take 1 tablet by mouth 2 (Two) Times a Day. 180 tablet 0    sulfamethoxazole-trimethoprim (BACTRIM DS,SEPTRA DS) 800-160 MG per tablet Take 1 tablet by mouth Every 12 (Twelve) Hours.       No facility-administered medications prior to visit.       No opioid medication identified on active medication list. I have reviewed chart for other potential  high risk medication/s and harmful drug interactions in the elderly.        Aspirin is  "not on active medication list.  Aspirin use is not indicated based on review of current medical condition/s. Risk of harm outweighs potential benefits.  .    Patient Active Problem List   Diagnosis    Stage 4 chronic kidney disease    Hypertension, essential    Nonrheumatic aortic valve stenosis    Lung nodule    Medicare annual wellness visit, subsequent    Pulmonary emphysema    Elevated LFTs    Problem related to lifestyle, unspecified    Need for Tdap vaccination    Aortic stenosis    Dyspnea    Tobacco abuse, in remission    Peripheral eosinophilia    Abnormal liver ultrasound    Nonrheumatic aortic valve insufficiency    Gastroesophageal reflux disease    Subacute cough    Leukocytosis    Acute kidney injury    Abnormal urine findings    IgA nephropathy    Anxiety    Asthma-COPD overlap syndrome    Overweight (BMI 25.0-29.9)    Heartburn     Advance Care Planning   Advance Care Planning     Advance Directive is not on file.  ACP discussion was held with the patient during this visit. Patient does not have an advance directive, information provided.     Objective    Vitals:    10/23/23 1321   BP: 114/81   BP Location: Right arm   Patient Position: Sitting   Pulse: 99   Temp: 98.1 °F (36.7 °C)   TempSrc: Oral   Weight: 83.5 kg (184 lb)   Height: 175.3 cm (69.02\")     Estimated body mass index is 27.16 kg/m² as calculated from the following:    Height as of this encounter: 175.3 cm (69.02\").    Weight as of this encounter: 83.5 kg (184 lb).           Does the patient have evidence of cognitive impairment?  He has some intellectual disability, but at baseline          HEALTH RISK ASSESSMENT    Smoking Status:  Social History     Tobacco Use   Smoking Status Former    Packs/day: 0.25    Years: 30.00    Additional pack years: 0.00    Total pack years: 7.50    Types: Cigarettes    Start date:     Quit date:     Years since quittin.8   Smokeless Tobacco Former    Types: Chew     Alcohol " Consumption:  Social History     Substance and Sexual Activity   Alcohol Use Not Currently    Comment: non-drinker     Fall Risk Screen:    NICHOLASJAMIL Fall Risk Assessment was completed, and patient is at LOW risk for falls.Assessment completed on:10/23/2023    Depression Screening:      10/23/2023     1:16 PM   PHQ-2/PHQ-9 Depression Screening   Little Interest or Pleasure in Doing Things 0-->not at all   Feeling Down, Depressed or Hopeless 0-->not at all   PHQ-9: Brief Depression Severity Measure Score 0       Health Habits and Functional and Cognitive Screening:      10/23/2023     1:17 PM   Functional & Cognitive Status   Do you have difficulty preparing food and eating? No   Do you have difficulty bathing yourself, getting dressed or grooming yourself? No   Do you have difficulty using the toilet? No   Do you have difficulty moving around from place to place? No   Do you have trouble with steps or getting out of a bed or a chair? No   Current Diet Well Balanced Diet   Dental Exam Not up to date   Eye Exam Up to date   Exercise (times per week) 0 times per week   Current Exercises Include No Regular Exercise   Do you need help using the phone?  No   Are you deaf or do you have serious difficulty hearing?  No   Do you need help to go to places out of walking distance? No   Do you need help shopping? No   Do you need help preparing meals?  No   Do you need help with housework?  No   Do you need help with laundry? No   Do you need help taking your medications? No   Do you need help managing money? No   Do you ever drive or ride in a car without wearing a seat belt? No   Have you felt unusual stress, anger or loneliness in the last month? No   Who do you live with? Spouse   If you need help, do you have trouble finding someone available to you? No   Have you been bothered in the last four weeks by sexual problems? No   Do you have difficulty concentrating, remembering or making decisions? No       Age-appropriate  Screening Schedule:  Refer to the list below for future screening recommendations based on patient's age, sex and/or medical conditions. Orders for these recommended tests are listed in the plan section. The patient has been provided with a written plan.    Health Maintenance   Topic Date Due    ZOSTER VACCINE (1 of 2) Never done    ANNUAL WELLNESS VISIT  10/10/2023    BMI FOLLOWUP  10/23/2024    COLORECTAL CANCER SCREENING  06/10/2030    TDAP/TD VACCINES (2 - Td or Tdap) 09/20/2031    HEPATITIS C SCREENING  Completed    COVID-19 Vaccine  Completed    Pneumococcal Vaccine 0-64  Completed    INFLUENZA VACCINE  Completed                  CMS Preventative Services Quick Reference  Risk Factors Identified During Encounter  Inactivity/Sedentary: Patient was advised to exercise at least 150 minutes a week per CDC recommendations. and Patient was advised to do at least 150 minutes a week of moderate intensity activity such as brisk walking and do at least 2 days a week of activities that strengthen muscles such as resistance training and do activities to improve balance such as standing on one foot about 3 days a week.  The above risks/problems have been discussed with the patient.  Pertinent information has been shared with the patient in the After Visit Summary.  An After Visit Summary and PPPS were made available to the patient.    Follow Up:   Next Medicare Wellness visit to be scheduled in 1 year.       Additional E&M Note during same encounter follows:  Patient has multiple medical problems which are significant and separately identifiable that require additional work above and beyond the Medicare Wellness Visit.      Chief Complaint  Medicare Wellness-subsequent and Hypertension    Subjective        HPI  Chad Oneill is also being seen today for other health issues as noted below       Has developed stage IV CKD and followed by nephrology last office visit September 8 that note is reviewed.  Renal biopsy  "consistent with IgA nephropathy.  Was placed on Cytoxan and prednisone but compliance with the regimen was questionable.  Has been placed back on losartan 25 mg daily and a low-sodium diet recommended.    He had been seeing the eye doctor, Luiz, for injections due to macular neovascularization and macular degeneration, but compliance has been in question.  He states he goes every other month.    Patient states that he would like to go back to using omeprazole instead of famotidine he finds the omeprazole much more effective.  He only takes it on a as needed basis.      Objective   Vital Signs:  /81 (BP Location: Right arm, Patient Position: Sitting)   Pulse 99   Temp 98.1 °F (36.7 °C) (Oral)   Ht 175.3 cm (69.02\")   Wt 83.5 kg (184 lb)   BMI 27.16 kg/m²     Physical Exam     No acute distress  Color is good  Eyes are nonicteric  Tympanic membranes clear  Oropharynx is clear  No cervical or supraclavicular adenopathy  Heart is regular rhythm with 3/6 systolic murmur at his upper right sternal border  Lungs bilaterally clear  Abdomen bowel sounds positive, soft, nontender, no organomegaly  No CVA tenderness  Lower extremities trace edema at the ankles  Neurologic no lateralizing focal deficits  Psych: No evidence of hallucinations or delusions.  Patient does have some cognitive limitations.                     Assessment and Plan   Diagnoses and all orders for this visit:    1. Medicare annual wellness visit, subsequent (Primary)  Assessment & Plan:  We reviewed the preventive service recommendations and created an individualized handout      2. Hypertension, essential  Assessment & Plan:  Blood pressure looks good continue current regimen      3. Nonrheumatic aortic valve stenosis  Assessment & Plan:  No issues with syncope, presyncope, chest pain, or worsening shortness of breath.  He has follow-up appointment with cardiology early November.  Last echocardiogram about a year ago.        4. " Stage 4 chronic kidney disease  Assessment & Plan:  Follow-up with nephrology as recommended. Stressed importance of avoiding nephrotoxins.  Could take the omeprazole as long as he is only using it on infrequent as-needed basis.      5. Overweight (BMI 25.0-29.9)  Assessment & Plan:  Patient's (Body mass index is 27.16 kg/m².) indicates that they are overweight with health conditions that include hypertension . Weight is improving with lifestyle modifications. BMI is is above average; BMI management plan is completed. We discussed portion control and increasing exercise.       6. Pulmonary emphysema, unspecified emphysema type  Assessment & Plan:  Pulmonary status at present seems stable.  Continue on current medications and follow-up with pulmonology as recommended      7. Anxiety  Assessment & Plan:  Says that he stopped taking the escitalopram a couple days ago because he was having some nausea and dizziness.  He thinks his symptoms have improved since he made the medication change.      8. Heartburn  -     Discontinue: omeprazole (priLOSEC) 40 MG capsule; Take 1 capsule by mouth Daily.  Dispense: 30 capsule; Refill: 1  -     omeprazole (priLOSEC) 40 MG capsule; Take 1 capsule by mouth Daily As Needed (heartburn).  Dispense: 30 capsule; Refill: 1    9. Need for vaccination  -     COVID-19 F23 (Pfizer) 12yrs+ (COMIRNATY)             Follow Up   No follow-ups on file.  Patient was given instructions and counseling regarding his condition or for health maintenance advice. Please see specific information pulled into the AVS if appropriate.

## 2023-10-23 NOTE — ASSESSMENT & PLAN NOTE
Says that he stopped taking the escitalopram a couple days ago because he was having some nausea and dizziness.  He thinks his symptoms have improved since he made the medication change.

## 2023-10-23 NOTE — TELEPHONE ENCOUNTER
Please get records from [facility] and asked them to copy us on his progress notes when seen please    mas

## 2023-10-28 RX ORDER — OMEPRAZOLE 40 MG/1
40 CAPSULE, DELAYED RELEASE ORAL DAILY PRN
Qty: 30 CAPSULE | Refills: 1 | Status: SHIPPED | OUTPATIENT
Start: 2023-10-28

## 2023-10-28 NOTE — ASSESSMENT & PLAN NOTE
Patient's (Body mass index is 27.16 kg/m².) indicates that they are overweight with health conditions that include hypertension . Weight is improving with lifestyle modifications. BMI is is above average; BMI management plan is completed. We discussed portion control and increasing exercise.

## 2023-10-28 NOTE — ASSESSMENT & PLAN NOTE
Pulmonary status at present seems stable.  Continue on current medications and follow-up with pulmonology as recommended

## 2023-10-28 NOTE — ASSESSMENT & PLAN NOTE
No issues with syncope, presyncope, chest pain, or worsening shortness of breath.  He has follow-up appointment with cardiology early November.  Last echocardiogram about a year ago.

## 2023-11-03 ENCOUNTER — OFFICE VISIT (OUTPATIENT)
Dept: CARDIOLOGY | Facility: CLINIC | Age: 63
End: 2023-11-03
Payer: MEDICARE

## 2023-11-03 VITALS
HEART RATE: 87 BPM | SYSTOLIC BLOOD PRESSURE: 149 MMHG | HEIGHT: 69 IN | WEIGHT: 188 LBS | DIASTOLIC BLOOD PRESSURE: 78 MMHG | BODY MASS INDEX: 27.85 KG/M2

## 2023-11-03 DIAGNOSIS — E78.2 MIXED HYPERLIPIDEMIA: ICD-10-CM

## 2023-11-03 DIAGNOSIS — I35.0 NONRHEUMATIC AORTIC VALVE STENOSIS: ICD-10-CM

## 2023-11-03 DIAGNOSIS — I10 PRIMARY HYPERTENSION: Primary | ICD-10-CM

## 2023-11-03 PROCEDURE — 99214 OFFICE O/P EST MOD 30 MIN: CPT

## 2023-11-03 PROCEDURE — 1160F RVW MEDS BY RX/DR IN RCRD: CPT

## 2023-11-03 PROCEDURE — 3078F DIAST BP <80 MM HG: CPT

## 2023-11-03 PROCEDURE — 1159F MED LIST DOCD IN RCRD: CPT

## 2023-11-03 PROCEDURE — 3077F SYST BP >= 140 MM HG: CPT

## 2023-11-03 NOTE — PROGRESS NOTES
Chief Complaint  Hypertension and Follow-up (6 mo f/u. )    Subjective        History of Present Illness  Chad Oneill presents to [facility] for follow up.  Mr. Oneill is a 63-year-old male with past medical history outlined below, significant for hypertension, hyperlipidemia, aortic valve stenosis, CKD 2 who presents for follow-up.  He is doing very well from a cardiac standpoint.  He notes no complaints or concerns.  He denies any chest pain or discomfort, dyspnea, orthopnea, edema, dizziness, lightheadedness or syncope.      Past Medical History:   Diagnosis Date    Acute bronchitis, unspecified     Acute upper respiratory infection, unspecified     Anxiety disorder, unspecified     Anxiety with depression     Chronic kidney disease, stage III (moderate)     Colon polyp     Cough     Dizziness and giddiness     Emphysema, unspecified     Fatigue     GERD (gastroesophageal reflux disease)     Heartburn     Hypertension     Mild intermittent acute bronchitis with asthma with acute exacerbation     Nonrheumatic aortic (valve) stenosis     Vision problem        ALLERGY  Allergies   Allergen Reactions    Benadryl [Diphenhydramine] Palpitations        Past Surgical History:   Procedure Laterality Date    CIRCUMCISION      COLONOSCOPY W/ BIOPSIES AND POLYPECTOMY      ENDOSCOPY  2013    HAND SURGERY Right     INJURY TO R HAND    KNEE ARTHROSCOPY Left         Social History     Socioeconomic History    Marital status:     Number of children: 2   Tobacco Use    Smoking status: Former     Packs/day: 0.25     Years: 30.00     Additional pack years: 0.00     Total pack years: 7.50     Types: Cigarettes     Start date:      Quit date:      Years since quittin.8    Smokeless tobacco: Former     Types: Chew   Vaping Use    Vaping Use: Never used   Substance and Sexual Activity    Alcohol use: Not Currently     Comment: non-drinker    Drug use: Never     Comment: none     "Sexual activity: Defer       Family History   Problem Relation Age of Onset    Coronary artery disease Mother     Pulmonary embolism Mother         POST OP    Heart attack Brother     Prostate cancer Brother     COPD Brother     Alcohol abuse Maternal Grandfather         Current Outpatient Medications on File Prior to Visit   Medication Sig    albuterol sulfate  (90 Base) MCG/ACT inhaler Inhale 2 puffs Every 4 (Four) Hours As Needed for Wheezing or Shortness of Air.    amLODIPine (NORVASC) 10 MG tablet Take 1 tablet by mouth once daily    Breztri Aerosphere 160-9-4.8 MCG/ACT aerosol inhaler INAHLE 2 PUFFS BY MOUTH TWICE DAILY RINSE MOUTH  OUT  AFTER  EACH  USE.    doxazosin (CARDURA) 4 MG tablet TAKE 1 TABLET BY MOUTH ONCE DAILY AT NIGHT    losartan (COZAAR) 25 MG tablet Take 1 tablet by mouth.    omeprazole (priLOSEC) 40 MG capsule Take 1 capsule by mouth Daily As Needed (heartburn).     No current facility-administered medications on file prior to visit.       Objective   Vitals:    11/03/23 0926   BP: 149/78   Pulse: 87   Weight: 85.3 kg (188 lb)   Height: 175.3 cm (69.02\")       Physical Exam  Constitutional:       General: He is awake. He is not in acute distress.     Appearance: Normal appearance.   HENT:      Head: Normocephalic.      Nose: Nose normal. No congestion.   Eyes:      Extraocular Movements: Extraocular movements intact.      Conjunctiva/sclera: Conjunctivae normal.      Pupils: Pupils are equal, round, and reactive to light.   Neck:      Thyroid: No thyromegaly.      Vascular: No JVD.   Cardiovascular:      Rate and Rhythm: Normal rate and regular rhythm.      Chest Wall: PMI is not displaced.      Pulses: Normal pulses.      Heart sounds: Normal heart sounds, S1 normal and S2 normal. No murmur heard.     No friction rub. No gallop. No S3 or S4 sounds.   Pulmonary:      Effort: Pulmonary effort is normal.      Breath sounds: Normal breath sounds. No wheezing, rhonchi or rales.   Abdominal: "      General: Bowel sounds are normal.      Palpations: Abdomen is soft.      Tenderness: There is no abdominal tenderness.   Musculoskeletal:      Cervical back: No tenderness.      Right lower leg: No edema.      Left lower leg: No edema.   Lymphadenopathy:      Cervical: No cervical adenopathy.   Skin:     General: Skin is warm and dry.      Capillary Refill: Capillary refill takes less than 2 seconds.      Coloration: Skin is not cyanotic.      Findings: No petechiae or rash.      Nails: There is no clubbing.   Neurological:      Mental Status: He is alert.   Psychiatric:         Mood and Affect: Mood normal.         Behavior: Behavior is cooperative.           Result Review     The following data was reviewed by HIRAM Wiley on 11/03/23.    proBNP   Date Value Ref Range Status   03/14/2023 484.6 0.0 - 900.0 pg/mL Final     CMP          3/28/2023    05:33 4/3/2023    11:06 10/3/2023    13:52   CMP   Glucose 96  126  124    BUN 59  42  11    Creatinine 3.90  2.62  1.87    EGFR 16.4  26.5  39.7    Sodium 140  142  140    Potassium 4.0  5.1  3.3    Chloride 102  107  102    Calcium 8.6  9.1  9.7    BUN/Creatinine Ratio 15.1  16.0  5.9    Anion Gap 12.1  12.0  12.4      CBC w/diff          6/29/2023    11:55 7/11/2023    14:58 10/3/2023    13:52   CBC w/Diff   WBC 13.31  16.43  11.80    RBC 4.43  4.34  3.67    Hemoglobin 14.8  14.6  11.9    Hematocrit 43.1  41.8  34.4    MCV 97.3  96.3  93.7    MCH 33.4  33.6  32.4    MCHC 34.3  34.9  34.6    RDW 12.7  12.6  13.2    Platelets 155  134  220    Neutrophil Rel % 90.6  86.5  82.0    Immature Granulocyte Rel % 1.9  5.4  0.8    Lymphocyte Rel % 5.7  5.3  9.6    Monocyte Rel % 1.7  2.3  6.6    Eosinophil Rel % 0.0  0.1  0.7    Basophil Rel % 0.1  0.4  0.3           Results for orders placed during the hospital encounter of 10/19/22    Adult Transthoracic Echo Complete W/ Cont if Necessary Per Protocol    Interpretation Summary    Left ventricular ejection  fraction appears to be 56 - 60%.    Left ventricular diastolic function is consistent with (grade I) impaired relaxation.    Mild aortic valve stenosis is present with aortic valve area 1.52 cm² to max/mean pressure gradient 21/12 mmHg.  Mild to moderate aortic insufficiency.    Mild mitral and tricuspid regurgitation.           Procedures    Assessment & Plan  Diagnoses and all orders for this visit:    1. Primary hypertension (Primary)    2. Nonrheumatic aortic valve stenosis    3. Mixed hyperlipidemia    1.  Blood pressure is elevated in the office today but he has not taken his home blood pressure medication yet this morning.  He notes that his blood pressure tends to run within normal range at home.  Continue current medications and home BP monitoring.  2.  Mild aortic valve stenosis noted on most recent echocardiogram in 2022.  Plan to repeat echocardiogram to assess for disease progression.  3.  Continue statin therapy.  Plan to repeat lipid panel at next follow-up visit.  Follow Up   Return in about 6 months (around 5/3/2024) for With Dr. Barakat.    Patient was given instructions and counseling regarding his condition or for health maintenance advice. Please see specific information pulled into the AVS if appropriate.     Shea King, HIRAM  11/03/23  09:40 EDT    Dictated Utilizing Dragon Dictation

## 2023-11-09 DIAGNOSIS — I10 HYPERTENSION, ESSENTIAL: ICD-10-CM

## 2023-11-09 RX ORDER — AMLODIPINE BESYLATE 10 MG/1
10 TABLET ORAL DAILY
Qty: 90 TABLET | Refills: 0 | Status: SHIPPED | OUTPATIENT
Start: 2023-11-09

## 2023-11-09 RX ORDER — DOXAZOSIN MESYLATE 4 MG/1
4 TABLET ORAL NIGHTLY
Qty: 30 TABLET | Refills: 5 | Status: SHIPPED | OUTPATIENT
Start: 2023-11-09

## 2023-12-14 ENCOUNTER — TRANSCRIBE ORDERS (OUTPATIENT)
Dept: LAB | Facility: HOSPITAL | Age: 63
End: 2023-12-14
Payer: MEDICARE

## 2023-12-14 ENCOUNTER — LAB (OUTPATIENT)
Dept: LAB | Facility: HOSPITAL | Age: 63
End: 2023-12-14
Payer: MEDICARE

## 2023-12-14 ENCOUNTER — HOSPITAL ENCOUNTER (OUTPATIENT)
Dept: GENERAL RADIOLOGY | Facility: HOSPITAL | Age: 63
Discharge: HOME | End: 2023-12-14
Payer: MEDICARE

## 2023-12-14 ENCOUNTER — OFFICE VISIT (OUTPATIENT)
Dept: FAMILY MEDICINE CLINIC | Age: 63
End: 2023-12-14
Payer: MEDICARE

## 2023-12-14 VITALS
HEIGHT: 69 IN | HEART RATE: 105 BPM | BODY MASS INDEX: 28.58 KG/M2 | DIASTOLIC BLOOD PRESSURE: 84 MMHG | OXYGEN SATURATION: 95 % | WEIGHT: 193 LBS | SYSTOLIC BLOOD PRESSURE: 146 MMHG | TEMPERATURE: 99 F

## 2023-12-14 DIAGNOSIS — J44.1 COPD WITH EXACERBATION: Primary | ICD-10-CM

## 2023-12-14 DIAGNOSIS — N18.4 CHRONIC KIDNEY DISEASE, STAGE IV (SEVERE): ICD-10-CM

## 2023-12-14 DIAGNOSIS — R05.1 ACUTE COUGH: ICD-10-CM

## 2023-12-14 DIAGNOSIS — J44.89 ASTHMA-COPD OVERLAP SYNDROME: ICD-10-CM

## 2023-12-14 DIAGNOSIS — N18.4 CHRONIC KIDNEY DISEASE, STAGE IV (SEVERE): Primary | ICD-10-CM

## 2023-12-14 DIAGNOSIS — J02.9 SORE THROAT: ICD-10-CM

## 2023-12-14 LAB
ALPHA1 GLOB MFR UR ELPH: 169 MG/DL (ref 90–200)
ANION GAP SERPL CALCULATED.3IONS-SCNC: 11.5 MMOL/L (ref 5–15)
BUN SERPL-MCNC: 13 MG/DL (ref 8–23)
BUN/CREAT SERPL: 7 (ref 7–25)
CALCIUM SPEC-SCNC: 9.3 MG/DL (ref 8.6–10.5)
CHLORIDE SERPL-SCNC: 101 MMOL/L (ref 98–107)
CO2 SERPL-SCNC: 25.5 MMOL/L (ref 22–29)
CREAT SERPL-MCNC: 1.87 MG/DL (ref 0.76–1.27)
EGFRCR SERPLBLD CKD-EPI 2021: 39.7 ML/MIN/1.73
EXPIRATION DATE: NORMAL
EXPIRATION DATE: NORMAL
FLUAV AG UPPER RESP QL IA.RAPID: NOT DETECTED
FLUBV AG UPPER RESP QL IA.RAPID: NOT DETECTED
GLUCOSE SERPL-MCNC: 112 MG/DL (ref 65–99)
INTERNAL CONTROL: NORMAL
INTERNAL CONTROL: NORMAL
Lab: NORMAL
Lab: NORMAL
POTASSIUM SERPL-SCNC: 4.3 MMOL/L (ref 3.5–5.2)
S PYO AG THROAT QL: NEGATIVE
SARS-COV-2 AG UPPER RESP QL IA.RAPID: NOT DETECTED
SODIUM SERPL-SCNC: 138 MMOL/L (ref 136–145)

## 2023-12-14 PROCEDURE — 82103 ALPHA-1-ANTITRYPSIN TOTAL: CPT | Performed by: NURSE PRACTITIONER

## 2023-12-14 PROCEDURE — 71046 X-RAY EXAM CHEST 2 VIEWS: CPT

## 2023-12-14 PROCEDURE — 80048 BASIC METABOLIC PNL TOTAL CA: CPT

## 2023-12-14 PROCEDURE — 87081 CULTURE SCREEN ONLY: CPT | Performed by: PHYSICIAN ASSISTANT

## 2023-12-14 RX ORDER — AZITHROMYCIN 250 MG/1
TABLET, FILM COATED ORAL
Qty: 6 TABLET | Refills: 0 | Status: SHIPPED | OUTPATIENT
Start: 2023-12-14

## 2023-12-14 RX ORDER — METHYLPREDNISOLONE 4 MG/1
TABLET ORAL
Qty: 21 EACH | Refills: 0 | Status: SHIPPED | OUTPATIENT
Start: 2023-12-14

## 2023-12-14 RX ORDER — PREDNISONE 20 MG/1
1 TABLET ORAL DAILY
COMMUNITY
Start: 2023-11-28 | End: 2023-12-14

## 2023-12-14 NOTE — PROGRESS NOTES
"  Diagnoses and all orders for this visit:    1. COPD with exacerbation (Primary)  -     methylPREDNISolone (MEDROL) 4 MG dose pack; Take as directed on package instructions.  Dispense: 21 each; Refill: 0  -     azithromycin (Zithromax Z-Juan Pablo) 250 MG tablet; Take 2 tablets by mouth on day 1, then 1 tablet daily on days 2-5  Dispense: 6 tablet; Refill: 0    2. Acute cough  -     POCT SARS-CoV-2 Antigen DALE + Flu  -     XR Chest PA & Lateral; Future    3. Sore throat  -     POCT rapid strep A  -     Beta Strep Culture, Throat - , Throat; Future  -     Beta Strep Culture, Throat - Swab, Throat    4. Asthma-COPD overlap syndrome            Subjective     CHIEF COMPLAINT    Chief Complaint   Patient presents with    Cough     X 2 weeks. Pt states he believes he has bronchitis.     Sore Throat    Shortness of Breath            History of Present Illness  This is a 63-year-old male presented to the clinic complaining of cough and shortness of breath for the last 2 weeks.  He states his cough is productive and he is \"choking on mucus.\"  He has had a history of bronchitis and it feels similar to that.  He believes his symptoms are related to wood-burning stove that he has in his home.            Review of Systems   Constitutional:  Negative for chills, fatigue and fever.   HENT:  Positive for congestion and sore throat. Negative for rhinorrhea.    Respiratory:  Positive for cough and shortness of breath. Negative for wheezing.    Cardiovascular:  Negative for chest pain.   Gastrointestinal:  Negative for abdominal pain, diarrhea, nausea and vomiting.   Musculoskeletal:  Negative for myalgias.   Skin:  Negative for rash.   Neurological:  Positive for headaches.            Past Medical History:   Diagnosis Date    Acute bronchitis, unspecified     Acute upper respiratory infection, unspecified     Anxiety disorder, unspecified     Anxiety with depression     Chronic kidney disease, stage III (moderate)     Colon polyp     Cough "     Dizziness and giddiness     Emphysema, unspecified     Fatigue     GERD (gastroesophageal reflux disease)     Heartburn     Hypertension     Mild intermittent acute bronchitis with asthma with acute exacerbation     Nonrheumatic aortic (valve) stenosis     Vision problem             Past Surgical History:   Procedure Laterality Date    CIRCUMCISION      COLONOSCOPY W/ BIOPSIES AND POLYPECTOMY      ENDOSCOPY  2013    HAND SURGERY Right     INJURY TO R HAND    KNEE ARTHROSCOPY Left             Family History   Problem Relation Age of Onset    Coronary artery disease Mother     Pulmonary embolism Mother         POST OP    Heart attack Brother     Prostate cancer Brother     COPD Brother     Alcohol abuse Maternal Grandfather             Social History     Socioeconomic History    Marital status:     Number of children: 2   Tobacco Use    Smoking status: Former     Packs/day: 0.25     Years: 30.00     Additional pack years: 0.00     Total pack years: 7.50     Types: Cigarettes     Start date:      Quit date:      Years since quittin.9    Smokeless tobacco: Former     Types: Chew   Vaping Use    Vaping Use: Never used   Substance and Sexual Activity    Alcohol use: Not Currently     Comment: non-drinker    Drug use: Never     Comment: none    Sexual activity: Defer            Allergies   Allergen Reactions    Benadryl [Diphenhydramine] Palpitations            Current Outpatient Medications on File Prior to Visit   Medication Sig Dispense Refill    albuterol sulfate  (90 Base) MCG/ACT inhaler Inhale 2 puffs Every 4 (Four) Hours As Needed for Wheezing or Shortness of Air. 18 g 11    amLODIPine (NORVASC) 10 MG tablet Take 1 tablet by mouth Daily. 90 tablet 0    Breztri Aerosphere 160-9-4.8 MCG/ACT aerosol inhaler INAHLE 2 PUFFS BY MOUTH TWICE DAILY RINSE MOUTH  OUT  AFTER  EACH  USE. 11 g 6    doxazosin (CARDURA) 4 MG tablet Take 1 tablet by mouth Every Night. 30 tablet 5    losartan  "(COZAAR) 25 MG tablet Take 2 tablets by mouth.      omeprazole (priLOSEC) 40 MG capsule Take 1 capsule by mouth Daily As Needed (heartburn). 30 capsule 1    [DISCONTINUED] predniSONE (DELTASONE) 20 MG tablet Take 1 tablet by mouth Daily.       No current facility-administered medications on file prior to visit.            /84   Pulse 105   Temp 99 °F (37.2 °C) (Oral)   Ht 175.3 cm (69.02\")   Wt 87.5 kg (193 lb)   SpO2 95% Comment: room air  BMI 28.49 kg/m²          Objective     Physical Exam  Vitals and nursing note reviewed.   Constitutional:       General: He is not in acute distress.     Appearance: Normal appearance.   HENT:      Head: Normocephalic and atraumatic.      Right Ear: Tympanic membrane, ear canal and external ear normal.      Left Ear: Tympanic membrane, ear canal and external ear normal.      Nose: No congestion or rhinorrhea.      Mouth/Throat:      Mouth: Mucous membranes are moist.      Palate: Lesions (petechial-appearing) present.      Pharynx: Oropharynx is clear. Posterior oropharyngeal erythema present.   Eyes:      Extraocular Movements: Extraocular movements intact.      Conjunctiva/sclera: Conjunctivae normal.      Pupils: Pupils are equal, round, and reactive to light.   Cardiovascular:      Rate and Rhythm: Normal rate and regular rhythm.      Heart sounds: Normal heart sounds.   Pulmonary:      Effort: Pulmonary effort is normal. No respiratory distress.      Breath sounds: Normal breath sounds. No wheezing, rhonchi or rales.      Comments: States he used his inhaler just prior to visit  Musculoskeletal:      Cervical back: Normal range of motion. No rigidity.   Skin:     General: Skin is warm and dry.   Neurological:      Mental Status: He is alert and oriented to person, place, and time.   Psychiatric:         Mood and Affect: Mood normal.         Behavior: Behavior normal.                       Lab Results (last 24 hours)       Procedure Component Value Units Date/Time "    Alpha - 1 - Antitrypsin [[id number]] Collected: 12/14/23 1415    Specimen: Blood Updated: 12/14/23 1415    Basic Metabolic Panel [[id number]] Collected: 12/14/23 1415    Specimen: Blood Updated: 12/14/23 1415    POCT rapid strep A [[id number]] Collected: 12/14/23 1448    Specimen: Swab Updated: 12/14/23 1449     Rapid Strep A Screen Negative     Internal Control Passed     Lot Number [id number]     Expiration Date 04/30/2025    Beta Strep Culture, Throat - Swab, Throat [[id number]] Collected: 12/14/23 1450    Specimen: Swab from Throat Updated: 12/14/23 1503    POCT SARS-CoV-2 Antigen DALE + Flu [[id number]] Collected: 12/14/23 1450    Specimen: Swab Updated: 12/14/23 1451     SARS Antigen Not Detected     Influenza A Antigen DALE Not Detected     Influenza B Antigen DALE Not Detected     Internal Control Passed     Lot Number [id number]     Expiration Date 09/13/2024                  XR Chest PA & Lateral    Result Date: 12/14/2023  PROCEDURE: XR CHEST PA AND LATERAL  COMPARISON: [facility], CR, XR CHEST 1 VW, 3/18/2023, 18:05.  [facility], CT, CT CHEST WO CONTRAST DIAGNOSTIC, 7/24/2023, 12:49.  INDICATIONS: COUGH, SHORTNESS OF BREATH X 3 WEEKS  FINDINGS:  The heart size is normal.  The pulmonary vascular markings are normal.  The lungs and pleural spaces are clear of active disease.  There is mild thoracic spondylosis.       No active disease.     TIBURCIO VIZCAINO MD       Electronically Signed and Approved By: TIBURCIO VIZCAINO MD on 12/14/2023 at 15:36                              Diagnoses and all orders for this visit:    1. COPD with exacerbation (Primary)  -     methylPREDNISolone (MEDROL) 4 MG dose pack; Take as directed on package instructions.  Dispense: 21 each; Refill: 0  -     azithromycin (Zithromax Z-Juan Pablo) 250 MG tablet; Take 2 tablets by mouth on day 1, then 1 tablet daily on days 2-5  Dispense: 6 tablet; Refill: 0    2. Acute cough  -     POCT SARS-CoV-2 Antigen DALE + Flu  -     XR Chest PA &  Lateral; Future    3. Sore throat  -     POCT rapid strep A  -     Beta Strep Culture, Throat - , Throat; Future  -     Beta Strep Culture, Throat - Swab, Throat    4. Asthma-COPD overlap syndrome             Additional Instructions for the Follow-ups that You Need to Schedule       Beta Strep Culture, Throat - , Throat    Dec 14, 2023 (Approximate)      Release to patient: Routine Release                          FOR FULL DISCHARGE INSTRUCTIONS/COMMENTS/HANDOUTS please see the   AVS

## 2023-12-16 LAB — BACTERIA SPEC AEROBE CULT: NORMAL

## 2024-02-02 DIAGNOSIS — I10 HYPERTENSION, ESSENTIAL: ICD-10-CM

## 2024-02-02 RX ORDER — AMLODIPINE BESYLATE 10 MG/1
10 TABLET ORAL DAILY
Qty: 90 TABLET | Refills: 0 | Status: SHIPPED | OUTPATIENT
Start: 2024-02-02

## 2024-02-07 NOTE — PROGRESS NOTES
Primary Care Provider  Kuldeep Winters MD     Referring Provider  No ref. provider found     Chief Complaint  Cough, Shortness of Breath, Wheezing, and Follow-up (4 month follow up )    Subjective          History of Presenting Illness  Patient is a 63-year-old male, patient of Dr. Le's who presents for management of emphysema and lung nodule who presents for follow-up visit today.  Patient reports that since last office visit he was treated for COPD exacerbation by his primary care provider on 12/14/2023 with a Medrol Dosepak and a Z-Juan Pablo.  Patient also had a chest x-ray completed on 12/14/2023.  Report states no active disease.  Patient states that he is feeling better.  Patient states that his breathing is at baseline.  Patient states that he does get short of breath that is worse with exertion, mild to moderate in severity, and improved with rest.  Patient states that he is taking Breztri every day as prescribed and uses albuterol inhaler as needed.  Last office visit patient had alpha-1 antitrypsin level and genotype drawn.  Alpha-1 came back with normal genotype of M/M with level 336.  Patient is under the care of cardiologist, Dr. Lopes for history of aortic stenosis.  Patient denies fever, chills, night sweats, swollen glands in the head and neck, unintentional weight loss, hemoptysis, purulent sputum production, dysphagia, chest pain, palpitations, chest tightness, abdominal pain, nausea, vomiting, and diarrhea.  Patient also denies any myalgias, changes in sense of taste and/or smell, sore throat, any other coronavirus or flu-like symptoms.  Patient denies any leg swelling, orthopnea, paroxysmal nocturnal dyspnea.  Patient is able to perform activities of daily living.        Review of Systems     Family History   Problem Relation Age of Onset    Coronary artery disease Mother     Pulmonary embolism Mother         POST OP    Heart attack Brother     Prostate cancer Brother     COPD Brother      Alcohol abuse Maternal Grandfather         Social History     Socioeconomic History    Marital status:     Number of children: 2   Tobacco Use    Smoking status: Former     Packs/day: 0.25     Years: 30.00     Additional pack years: 0.00     Total pack years: 7.50     Types: Cigarettes     Start date:      Quit date:      Years since quittin.1    Smokeless tobacco: Former     Types: Chew   Vaping Use    Vaping Use: Never used   Substance and Sexual Activity    Alcohol use: Not Currently     Comment: non-drinker    Drug use: Never     Comment: none    Sexual activity: Defer        Past Medical History:   Diagnosis Date    Acute bronchitis, unspecified     Acute upper respiratory infection, unspecified     Anxiety disorder, unspecified     Anxiety with depression     Chronic kidney disease, stage III (moderate)     Colon polyp     Cough     Dizziness and giddiness     Emphysema, unspecified     Fatigue     GERD (gastroesophageal reflux disease)     Heartburn     Hypertension     Mild intermittent acute bronchitis with asthma with acute exacerbation     Nonrheumatic aortic (valve) stenosis     Vision problem         Immunization History   Administered Date(s) Administered    COVID-19 (PFIZER) BIVALENT 12+YRS 10/10/2022    COVID-19 (PFIZER) Purple Cap Monovalent 2021, 2021    COVID-19 F23 (PFIZER) 12YRS+ (COMIRNATY) 10/23/2023    Covid-19 (Pfizer) Gray Cap Monovalent 2022    Flu Vaccine Intradermal Quad 18-64YR 2013    Flu Vaccine Quad PF >36MO 2015    Fluzone (or Fluarix & Flulaval for VFC) >6mos 2021, 10/10/2022, 2023    Influenza Quad Vaccine (Inpatient) 2013    Influenza, Unspecified 2020    PPD Test 2015    Pneumococcal Conjugate 20-Valent (PCV20) 2023    Pneumococcal Polysaccharide (PPSV23) 2020    Tdap 2021       Allergies   Allergen Reactions    Benadryl [Diphenhydramine] Palpitations          Current Outpatient  "Medications:     albuterol sulfate  (90 Base) MCG/ACT inhaler, Inhale 2 puffs Every 4 (Four) Hours As Needed for Wheezing or Shortness of Air., Disp: 18 g, Rfl: 11    amLODIPine (NORVASC) 10 MG tablet, Take 1 tablet by mouth once daily, Disp: 90 tablet, Rfl: 0    Budeson-Glycopyrrol-Formoterol (Breztri Aerosphere) 160-9-4.8 MCG/ACT aerosol inhaler, Inhale 2 puffs 2 (Two) Times a Day for 30 days. Rinse mouth out after each use, Disp: 1 each, Rfl: 11    doxazosin (CARDURA) 4 MG tablet, Take 1 tablet by mouth Every Night., Disp: 30 tablet, Rfl: 5    losartan (COZAAR) 25 MG tablet, Take 2 tablets by mouth., Disp: , Rfl:     omeprazole (priLOSEC) 40 MG capsule, Take 1 capsule by mouth Daily As Needed (heartburn)., Disp: 30 capsule, Rfl: 1     Objective     Physical Exam  Vital Signs:   WDWN, Alert, NAD.    HEENT:  PERRL, EOMI.  OP, nares clear, no sinus tenderness  Neck:  Supple, no JVD, no thyromegaly.  Lymph: no axillary, cervical, supraclavicular lymphadenopathy noted bilaterally  Chest:  good aeration, clear to auscultation bilaterally, tympanic to percussion bilaterally, no work of breathing noted  CV: RRR, no MGR, pulses 2+, equal.  Abd:  Soft, NT, ND, + BS, no HSM  EXT:  no clubbing, no cyanosis, no edema, no joint tenderness  Neuro:  A&Ox3, CN grossly intact, no focal deficits.  Skin: No rashes or lesions noted.    /74 (BP Location: Left arm, Patient Position: Sitting, Cuff Size: Small Adult)   Pulse 73   Resp 16   Ht 175.3 cm (69.02\")   Wt 86.6 kg (191 lb)   SpO2 97%   BMI 28.19 kg/m²         Result Review :   I have reviewed my last office visit note. I also reviewed chest x-ray report dated from 12/14/2023. See scanned report.     Procedures:      XR Chest PA & Lateral    Result Date: 12/14/2023   No active disease.     TIBURCIO VIZCAINO MD       Electronically Signed and Approved By: TIBURCIO VIZCAINO MD on 12/14/2023 at 15:36                 Assessment and Plan      Assessment:  1.  Mild COPD/asthma " overlap syndrome with an FEV1 of 73% predicted.  Alpha-1 antitrypsin with a normal genotype of M/M.  2. Lung nodule. 5 mm solitary lung nodule right upper lobe.  Stable since 07/2020 with no further follow up necessary per recent chest CT report completed on 7/25/2023.  3.  Dyspnea.        4.  Emphysema.  5.  Peripheral eosinophilia.    6.  Tobacco abuse with cigarettes in remission.  Not eligible for lung cancer screening as patient reports that he quit smoking in 2005.           Plan:  1.  Continue Breztri as prescribed and rinse mouth out after each use.  2.  Continue albuterol inhaler as needed. COPD action plan discussed with the patient the office today.  3.  Patient is advised to avoid burning wood.  Discussed with patient that exposure to wood-burning smoke can cause asthma attacks and bronchitis and also can aggravate heart and lung disease.  4.  Lung nodule stable since July 2020 with no further follow-up necessary per recent chest CT report.  5.  Vaccination status: patient reports they are up-to-date with flu, pneumonia, and Covid vaccines.  Patient is advised to continue to follow CDC recommendations such as social distancing wearing a mask and washing hands for at least 20 seconds.  6.  Smoking status: patient is a former cigarette smoker.  Not eligible for lung cancer screening as patient reports that he quit smoking in 2005.  7.  Patient to call the office, 911, or go to the ER with new or worsening symptoms.  8.  Follow-up in 6 months, sooner if needed.            Follow Up   Return in about 6 months (around 8/22/2024) for in [place] with Dr. Le.  Patient was given instructions and counseling regarding his condition or for health maintenance advice. Please see specific information pulled into the AVS if appropriate.

## 2024-02-22 ENCOUNTER — OFFICE VISIT (OUTPATIENT)
Dept: PULMONOLOGY | Facility: CLINIC | Age: 64
End: 2024-02-22
Payer: MEDICARE

## 2024-02-22 VITALS
HEIGHT: 69 IN | SYSTOLIC BLOOD PRESSURE: 147 MMHG | HEART RATE: 73 BPM | RESPIRATION RATE: 16 BRPM | BODY MASS INDEX: 28.29 KG/M2 | DIASTOLIC BLOOD PRESSURE: 74 MMHG | WEIGHT: 191 LBS | OXYGEN SATURATION: 97 %

## 2024-02-22 DIAGNOSIS — R06.00 DYSPNEA, UNSPECIFIED TYPE: ICD-10-CM

## 2024-02-22 DIAGNOSIS — D72.19 PERIPHERAL EOSINOPHILIA: ICD-10-CM

## 2024-02-22 DIAGNOSIS — J44.89 ASTHMA-COPD OVERLAP SYNDROME: Primary | ICD-10-CM

## 2024-02-22 DIAGNOSIS — J43.9 PULMONARY EMPHYSEMA, UNSPECIFIED EMPHYSEMA TYPE: ICD-10-CM

## 2024-02-22 DIAGNOSIS — F17.201 TOBACCO ABUSE, IN REMISSION: ICD-10-CM

## 2024-02-22 DIAGNOSIS — R91.1 LUNG NODULE: ICD-10-CM

## 2024-02-22 PROCEDURE — 99214 OFFICE O/P EST MOD 30 MIN: CPT | Performed by: NURSE PRACTITIONER

## 2024-02-22 PROCEDURE — 1160F RVW MEDS BY RX/DR IN RCRD: CPT | Performed by: NURSE PRACTITIONER

## 2024-02-22 PROCEDURE — 3077F SYST BP >= 140 MM HG: CPT | Performed by: NURSE PRACTITIONER

## 2024-02-22 PROCEDURE — 1159F MED LIST DOCD IN RCRD: CPT | Performed by: NURSE PRACTITIONER

## 2024-02-22 PROCEDURE — 3078F DIAST BP <80 MM HG: CPT | Performed by: NURSE PRACTITIONER

## 2024-02-22 RX ORDER — BUDESONIDE, GLYCOPYRROLATE, AND FORMOTEROL FUMARATE 160; 9; 4.8 UG/1; UG/1; UG/1
2 AEROSOL, METERED RESPIRATORY (INHALATION) 2 TIMES DAILY
Qty: 1 EACH | Refills: 11 | Status: SHIPPED | OUTPATIENT
Start: 2024-02-22 | End: 2024-03-23

## 2024-02-22 RX ORDER — ALBUTEROL SULFATE 90 UG/1
2 AEROSOL, METERED RESPIRATORY (INHALATION) EVERY 4 HOURS PRN
Qty: 18 G | Refills: 11 | Status: SHIPPED | OUTPATIENT
Start: 2024-02-22

## 2024-03-12 DIAGNOSIS — R12 HEARTBURN: ICD-10-CM

## 2024-03-12 RX ORDER — OMEPRAZOLE 40 MG/1
40 CAPSULE, DELAYED RELEASE ORAL DAILY
Qty: 30 CAPSULE | Refills: 0 | Status: SHIPPED | OUTPATIENT
Start: 2024-03-12

## 2024-03-19 ENCOUNTER — LAB (OUTPATIENT)
Dept: LAB | Facility: HOSPITAL | Age: 64
End: 2024-03-19
Payer: MEDICARE

## 2024-03-19 ENCOUNTER — TRANSCRIBE ORDERS (OUTPATIENT)
Dept: ADMINISTRATIVE | Facility: HOSPITAL | Age: 64
End: 2024-03-19
Payer: MEDICARE

## 2024-03-19 DIAGNOSIS — N18.4 CHRONIC RENAL DISEASE, STAGE IV: Primary | ICD-10-CM

## 2024-03-19 DIAGNOSIS — N18.4 CHRONIC RENAL DISEASE, STAGE IV: ICD-10-CM

## 2024-03-19 LAB
ANION GAP SERPL CALCULATED.3IONS-SCNC: 9.4 MMOL/L (ref 5–15)
BACTERIA UR QL AUTO: NORMAL /HPF
BASOPHILS # BLD AUTO: 0.03 10*3/MM3 (ref 0–0.2)
BASOPHILS NFR BLD AUTO: 0.5 % (ref 0–1.5)
BILIRUB UR QL STRIP: NEGATIVE
BUN SERPL-MCNC: 12 MG/DL (ref 8–23)
BUN/CREAT SERPL: 7.7 (ref 7–25)
CALCIUM SPEC-SCNC: 9.1 MG/DL (ref 8.6–10.5)
CHLORIDE SERPL-SCNC: 107 MMOL/L (ref 98–107)
CLARITY UR: CLEAR
CO2 SERPL-SCNC: 29.6 MMOL/L (ref 22–29)
COLOR UR: YELLOW
CREAT SERPL-MCNC: 1.56 MG/DL (ref 0.76–1.27)
CREAT UR-MCNC: 223.6 MG/DL
DEPRECATED RDW RBC AUTO: 46.4 FL (ref 37–54)
EGFRCR SERPLBLD CKD-EPI 2021: 49 ML/MIN/1.73
EOSINOPHIL # BLD AUTO: 0.25 10*3/MM3 (ref 0–0.4)
EOSINOPHIL NFR BLD AUTO: 4.3 % (ref 0.3–6.2)
ERYTHROCYTE [DISTWIDTH] IN BLOOD BY AUTOMATED COUNT: 12.8 % (ref 12.3–15.4)
GLUCOSE SERPL-MCNC: 105 MG/DL (ref 65–99)
GLUCOSE UR STRIP-MCNC: NEGATIVE MG/DL
HCT VFR BLD AUTO: 42.8 % (ref 37.5–51)
HGB BLD-MCNC: 14.3 G/DL (ref 13–17.7)
HGB UR QL STRIP.AUTO: ABNORMAL
IMM GRANULOCYTES # BLD AUTO: 0 10*3/MM3 (ref 0–0.05)
IMM GRANULOCYTES NFR BLD AUTO: 0 % (ref 0–0.5)
KETONES UR QL STRIP: NEGATIVE
LEUKOCYTE ESTERASE UR QL STRIP.AUTO: NEGATIVE
LYMPHOCYTES # BLD AUTO: 1.75 10*3/MM3 (ref 0.7–3.1)
LYMPHOCYTES NFR BLD AUTO: 30.1 % (ref 19.6–45.3)
MCH RBC QN AUTO: 32.6 PG (ref 26.6–33)
MCHC RBC AUTO-ENTMCNC: 33.4 G/DL (ref 31.5–35.7)
MCV RBC AUTO: 97.5 FL (ref 79–97)
MONOCYTES # BLD AUTO: 0.5 10*3/MM3 (ref 0.1–0.9)
MONOCYTES NFR BLD AUTO: 8.6 % (ref 5–12)
MUCOUS THREADS URNS QL MICRO: NORMAL /HPF
NEUTROPHILS NFR BLD AUTO: 3.28 10*3/MM3 (ref 1.7–7)
NEUTROPHILS NFR BLD AUTO: 56.5 % (ref 42.7–76)
NITRITE UR QL STRIP: NEGATIVE
PH UR STRIP.AUTO: 6 [PH] (ref 5–8)
PHOSPHATE SERPL-MCNC: 4.6 MG/DL (ref 2.5–4.5)
PLATELET # BLD AUTO: 156 10*3/MM3 (ref 140–450)
PMV BLD AUTO: 10.6 FL (ref 6–12)
POTASSIUM SERPL-SCNC: 4.2 MMOL/L (ref 3.5–5.2)
PROT ?TM UR-MCNC: 16.2 MG/DL
PROT UR QL STRIP: NEGATIVE
PROT/CREAT UR: 0.07 MG/G{CREAT}
PTH-INTACT SERPL-MCNC: 54.1 PG/ML (ref 15–65)
RBC # BLD AUTO: 4.39 10*6/MM3 (ref 4.14–5.8)
RBC # UR STRIP: NORMAL /HPF
REF LAB TEST METHOD: NORMAL
SODIUM SERPL-SCNC: 146 MMOL/L (ref 136–145)
SP GR UR STRIP: >=1.03 (ref 1–1.03)
SQUAMOUS #/AREA URNS HPF: NORMAL /HPF
UROBILINOGEN UR QL STRIP: ABNORMAL
WBC # UR STRIP: NORMAL /HPF
WBC NRBC COR # BLD AUTO: 5.81 10*3/MM3 (ref 3.4–10.8)

## 2024-03-19 PROCEDURE — 85025 COMPLETE CBC W/AUTO DIFF WBC: CPT

## 2024-03-19 PROCEDURE — 36415 COLL VENOUS BLD VENIPUNCTURE: CPT

## 2024-03-19 PROCEDURE — 81001 URINALYSIS AUTO W/SCOPE: CPT

## 2024-03-19 PROCEDURE — 80048 BASIC METABOLIC PNL TOTAL CA: CPT

## 2024-03-19 PROCEDURE — 84100 ASSAY OF PHOSPHORUS: CPT

## 2024-03-19 PROCEDURE — 82570 ASSAY OF URINE CREATININE: CPT

## 2024-03-19 PROCEDURE — 84156 ASSAY OF PROTEIN URINE: CPT

## 2024-03-19 PROCEDURE — 83970 ASSAY OF PARATHORMONE: CPT

## 2024-03-27 ENCOUNTER — TELEPHONE (OUTPATIENT)
Dept: FAMILY MEDICINE CLINIC | Age: 64
End: 2024-03-27
Payer: MEDICARE

## 2024-03-27 NOTE — TELEPHONE ENCOUNTER
Name: Chad Oneill    Relationship: Self    Best Callback Number: [phone]     HUB PROVIDED THE RELAY MESSAGE FROM THE OFFICE   PATIENT SCHEDULED AS REQUESTED    ADDITIONAL INFORMATION:

## 2024-04-22 DIAGNOSIS — I10 HYPERTENSION, ESSENTIAL: ICD-10-CM

## 2024-04-23 ENCOUNTER — OFFICE VISIT (OUTPATIENT)
Dept: FAMILY MEDICINE CLINIC | Age: 64
End: 2024-04-23
Payer: MEDICARE

## 2024-04-23 VITALS
TEMPERATURE: 97.8 F | BODY MASS INDEX: 28.5 KG/M2 | DIASTOLIC BLOOD PRESSURE: 70 MMHG | HEART RATE: 90 BPM | WEIGHT: 192.4 LBS | HEIGHT: 69 IN | SYSTOLIC BLOOD PRESSURE: 137 MMHG

## 2024-04-23 DIAGNOSIS — J43.9 PULMONARY EMPHYSEMA, UNSPECIFIED EMPHYSEMA TYPE: ICD-10-CM

## 2024-04-23 DIAGNOSIS — N18.4 STAGE 4 CHRONIC KIDNEY DISEASE: ICD-10-CM

## 2024-04-23 DIAGNOSIS — I35.0 NONRHEUMATIC AORTIC VALVE STENOSIS: ICD-10-CM

## 2024-04-23 DIAGNOSIS — N02.B9 IGA NEPHROPATHY: ICD-10-CM

## 2024-04-23 DIAGNOSIS — I10 HYPERTENSION, ESSENTIAL: Primary | ICD-10-CM

## 2024-04-23 PROCEDURE — 3075F SYST BP GE 130 - 139MM HG: CPT | Performed by: FAMILY MEDICINE

## 2024-04-23 PROCEDURE — 99214 OFFICE O/P EST MOD 30 MIN: CPT | Performed by: FAMILY MEDICINE

## 2024-04-23 PROCEDURE — G2211 COMPLEX E/M VISIT ADD ON: HCPCS | Performed by: FAMILY MEDICINE

## 2024-04-23 PROCEDURE — 3078F DIAST BP <80 MM HG: CPT | Performed by: FAMILY MEDICINE

## 2024-04-23 RX ORDER — AMLODIPINE BESYLATE 10 MG/1
10 TABLET ORAL DAILY
Qty: 90 TABLET | Refills: 0 | Status: SHIPPED | OUTPATIENT
Start: 2024-04-23

## 2024-04-23 RX ORDER — LOSARTAN POTASSIUM 50 MG/1
50 TABLET ORAL DAILY
Qty: 90 TABLET | Refills: 1 | Status: SHIPPED | OUTPATIENT
Start: 2024-04-23

## 2024-04-23 RX ORDER — AMLODIPINE BESYLATE 10 MG/1
10 TABLET ORAL DAILY
Qty: 90 TABLET | Refills: 0 | OUTPATIENT
Start: 2024-04-23

## 2024-04-23 RX ORDER — DOXAZOSIN MESYLATE 4 MG/1
4 TABLET ORAL NIGHTLY
Qty: 30 TABLET | Refills: 5 | Status: SHIPPED | OUTPATIENT
Start: 2024-04-23

## 2024-04-23 NOTE — ASSESSMENT & PLAN NOTE
Kidney function is actually improved.  Avoid NSAIDs.  We will send in the losartan 50 mg tablets.

## 2024-04-23 NOTE — PROGRESS NOTES
"Chief Complaint  Hypertension    Subjective          Chad Oneill presents to [facility]  History of Present Illness    Patient states that he feels quite good.  He has been able to get out do some yard work.    Saw nephrology about a month ago.  That note is reviewed.  His losartan was increased to 50 mg daily.  Yet the patient states he is still only taking a single dose daily.    Echocardiogram December revealed EF of 56-60%  Has follow-up appointment with cardiology on May 14 with moderate aortic valve stenosis and mild to moderate aortic insufficiency    As far as his pulmonary status he says he is doing okay with use of the albuterol inhaler.  Next appointment with pulmonology in August        Current Outpatient Medications on File Prior to Visit   Medication Sig Dispense Refill    albuterol sulfate  (90 Base) MCG/ACT inhaler Inhale 2 puffs Every 4 (Four) Hours As Needed for Wheezing or Shortness of Air. 18 g 11    omeprazole (priLOSEC) 40 MG capsule Take 1 capsule by mouth once daily 30 capsule 0    [DISCONTINUED] amLODIPine (NORVASC) 10 MG tablet Take 1 tablet by mouth once daily 90 tablet 0    [DISCONTINUED] doxazosin (CARDURA) 4 MG tablet Take 1 tablet by mouth Every Night. 30 tablet 5    [DISCONTINUED] losartan (COZAAR) 25 MG tablet Take 2 tablets by mouth.       No current facility-administered medications on file prior to visit.       Review of Systems         Objective   Vital Signs:   /70 (BP Location: Left arm, Patient Position: Sitting)   Pulse 90   Temp 97.8 °F (36.6 °C) (Temporal)   Ht 175.3 cm (69.02\")   Wt 87.3 kg (192 lb 6.4 oz)   BMI 28.40 kg/m²     Physical Exam  Constitutional:       Appearance: Normal appearance.   Neck:      Vascular: No carotid bruit.   Cardiovascular:      Rate and Rhythm: Normal rate and regular rhythm.      Heart sounds: Murmur (3-4/6 systolic murmur right upper sternal border) heard.   Pulmonary:      Effort: No " respiratory distress.      Breath sounds: No wheezing or rales.   Musculoskeletal:      Right lower leg: No edema.      Left lower leg: No edema.   Lymphadenopathy:      Cervical: No cervical adenopathy.   Neurological:      General: No focal deficit present.      Mental Status: He is alert.   Psychiatric:         Attention and Perception: Attention normal.         Mood and Affect: Mood normal.         Speech: Speech normal.            Result Review :                     Assessment and Plan    Diagnoses and all orders for this visit:    1. Hypertension, essential (Primary)  Assessment & Plan:  I will send in prescription for the 50 mg losartan tablets.      Orders:  -     amLODIPine (NORVASC) 10 MG tablet; Take 1 tablet by mouth Daily.  Dispense: 90 tablet; Refill: 0  -     doxazosin (CARDURA) 4 MG tablet; Take 1 tablet by mouth Every Night.  Dispense: 30 tablet; Refill: 5  -     losartan (COZAAR) 50 MG tablet; Take 1 tablet by mouth Daily.  Dispense: 90 tablet; Refill: 1    2. Nonrheumatic aortic valve stenosis  Assessment & Plan:  Continue on current regimen.  Reminded him of appointment with cardiology next month.       3. Pulmonary emphysema, unspecified emphysema type  Assessment & Plan:  Continue to follow with pulmonology as recommended         4. Stage 4 chronic kidney disease  Assessment & Plan:  Kidney function is actually improved.  Avoid NSAIDs.  We will send in the losartan 50 mg tablets.        5. IgA nephropathy  Assessment & Plan:  As above, follow-up with nephrology as recommended           Follow Up   No follow-ups on file.  Patient was given instructions and counseling regarding his condition or for health maintenance advice. Please see specific information pulled into the AVS if appropriate.

## 2024-04-27 DIAGNOSIS — I10 HYPERTENSION, ESSENTIAL: ICD-10-CM

## 2024-04-29 RX ORDER — AMLODIPINE BESYLATE 10 MG/1
10 TABLET ORAL DAILY
Qty: 90 TABLET | Refills: 0 | OUTPATIENT
Start: 2024-04-29

## 2024-05-14 ENCOUNTER — OFFICE VISIT (OUTPATIENT)
Dept: CARDIOLOGY | Facility: CLINIC | Age: 64
End: 2024-05-14
Payer: MEDICARE

## 2024-05-14 VITALS
WEIGHT: 200.2 LBS | HEART RATE: 77 BPM | BODY MASS INDEX: 29.65 KG/M2 | DIASTOLIC BLOOD PRESSURE: 72 MMHG | HEIGHT: 69 IN | SYSTOLIC BLOOD PRESSURE: 134 MMHG

## 2024-05-14 DIAGNOSIS — I35.0 NONRHEUMATIC AORTIC VALVE STENOSIS: ICD-10-CM

## 2024-05-14 DIAGNOSIS — I35.1 NONRHEUMATIC AORTIC VALVE INSUFFICIENCY: Primary | ICD-10-CM

## 2024-05-14 DIAGNOSIS — I10 PRIMARY HYPERTENSION: ICD-10-CM

## 2024-05-14 PROCEDURE — 3078F DIAST BP <80 MM HG: CPT | Performed by: INTERNAL MEDICINE

## 2024-05-14 PROCEDURE — 3075F SYST BP GE 130 - 139MM HG: CPT | Performed by: INTERNAL MEDICINE

## 2024-05-14 PROCEDURE — 99214 OFFICE O/P EST MOD 30 MIN: CPT | Performed by: INTERNAL MEDICINE

## 2024-05-14 NOTE — PROGRESS NOTES
Chief Complaint  Hypertension (6m Follow up)    Subjective        Chad Oneill presents to [facility] CARDIOLOGY  History of present illness:    Patient states overall he is doing well.  He remains very active with no chest pain, shortness of breath or presyncope.  He states he is following with the kidney doctors and they are pleased that his serum creatinine is improving.      Past Medical History:   Diagnosis Date    Acute bronchitis, unspecified     Acute upper respiratory infection, unspecified     Anxiety disorder, unspecified     Anxiety with depression     Chronic kidney disease, stage III (moderate)     Colon polyp     Cough     Dizziness and giddiness     Emphysema, unspecified     Fatigue     GERD (gastroesophageal reflux disease)     Heartburn     Hypertension     Mild intermittent acute bronchitis with asthma with acute exacerbation     Nonrheumatic aortic (valve) stenosis     Vision problem          Past Surgical History:   Procedure Laterality Date    CIRCUMCISION      COLONOSCOPY W/ BIOPSIES AND POLYPECTOMY      ENDOSCOPY  2013    HAND SURGERY Right     INJURY TO R HAND    KNEE ARTHROSCOPY Left           Social History     Socioeconomic History    Marital status:     Number of children: 2   Tobacco Use    Smoking status: Former     Current packs/day: 0.00     Average packs/day: 0.3 packs/day for 30.0 years (7.5 ttl pk-yrs)     Types: Cigarettes     Start date:      Quit date:      Years since quittin.3    Smokeless tobacco: Former     Types: Chew   Vaping Use    Vaping status: Never Used   Substance and Sexual Activity    Alcohol use: Not Currently     Comment: non-drinker    Drug use: Never     Comment: none    Sexual activity: Defer         Family History   Problem Relation Age of Onset    Coronary artery disease Mother     Pulmonary embolism Mother         POST OP    Heart attack Brother     Prostate cancer Brother     COPD Brother     Alcohol abuse  "Maternal Grandfather           Allergies   Allergen Reactions    Benadryl [Diphenhydramine] Palpitations            Current Outpatient Medications:     albuterol sulfate  (90 Base) MCG/ACT inhaler, Inhale 2 puffs Every 4 (Four) Hours As Needed for Wheezing or Shortness of Air., Disp: 18 g, Rfl: 11    amLODIPine (NORVASC) 10 MG tablet, Take 1 tablet by mouth Daily., Disp: 90 tablet, Rfl: 0    doxazosin (CARDURA) 4 MG tablet, Take 1 tablet by mouth Every Night., Disp: 30 tablet, Rfl: 5    losartan (COZAAR) 50 MG tablet, Take 1 tablet by mouth Daily., Disp: 90 tablet, Rfl: 1    omeprazole (priLOSEC) 40 MG capsule, Take 1 capsule by mouth once daily, Disp: 30 capsule, Rfl: 0      ROS:  Cardiac review of systems negative.    Objective     /72   Pulse 77   Ht 175.3 cm (69\")   Wt 90.8 kg (200 lb 3.2 oz)   BMI 29.56 kg/m²       General Appearance:   well developed  well nourished  HENT:   oropharynx moist  lips not cyanotic  Respiratory:  no respiratory distress  normal breath sounds  no rales  Cardiovascular:  no jugular venous distention  regular rhythm  S1 normal, S2 normal  no S3, no S4   no murmur  no rub, no thrill  No carotid bruit  pedal pulses normal  lower extremity edema: none    Musculoskeletal:  no clubbing of fingers.   normocephalic, head atraumatic  Skin:   warm, dry  Psychiatric:  judgement and insight appropriate  normal mood and affect    ECHO:  Results for orders placed in visit on 12/13/23    Adult Transthoracic Echo Complete w/ Color, Spectral and Contrast if necessary per protocol    Interpretation Summary    Left ventricular ejection fraction appears to be 56 - 60%.    Left ventricular diastolic function is consistent with (grade I) impaired relaxation.    Moderate aortic valve stenosis is present with max/mean pressure gradient 43/26 mmHg and aortic valve area approximately 1.2 cm².    Mild to moderate aortic insufficiency.    STRESS:    CATH:  No results found for this or any " previous visit.    BMP:     Glucose   Date Value Ref Range Status   03/19/2024 105 (H) 65 - 99 mg/dL Final     BUN   Date Value Ref Range Status   03/19/2024 12 8 - 23 mg/dL Final     Creatinine   Date Value Ref Range Status   03/19/2024 1.56 (H) 0.76 - 1.27 mg/dL Final     Sodium   Date Value Ref Range Status   03/19/2024 146 (H) 136 - 145 mmol/L Final     Potassium   Date Value Ref Range Status   03/19/2024 4.2 3.5 - 5.2 mmol/L Final     Chloride   Date Value Ref Range Status   03/19/2024 107 98 - 107 mmol/L Final     CO2   Date Value Ref Range Status   03/19/2024 29.6 (H) 22.0 - 29.0 mmol/L Final     Calcium   Date Value Ref Range Status   03/19/2024 9.1 8.6 - 10.5 mg/dL Final     BUN/Creatinine Ratio   Date Value Ref Range Status   03/19/2024 7.7 7.0 - 25.0 Final     Anion Gap   Date Value Ref Range Status   03/19/2024 9.4 5.0 - 15.0 mmol/L Final     eGFR   Date Value Ref Range Status   03/19/2024 49.0 (L) >60.0 mL/min/1.73 Final     LIPIDS:  Total Cholesterol   Date Value Ref Range Status   10/10/2022 192 0 - 200 mg/dL Final     Triglycerides   Date Value Ref Range Status   10/10/2022 296 (H) 0 - 150 mg/dL Final     HDL Cholesterol   Date Value Ref Range Status   10/10/2022 36 (L) 40 - 60 mg/dL Final     LDL Cholesterol    Date Value Ref Range Status   10/10/2022 105 (H) 0 - 100 mg/dL Final     VLDL Cholesterol   Date Value Ref Range Status   10/10/2022 51 (H) 5 - 40 mg/dL Final     LDL/HDL Ratio   Date Value Ref Range Status   10/10/2022 2.69  Final         Procedures             ASSESSMENT:  Diagnoses and all orders for this visit:    1. Nonrheumatic aortic valve insufficiency (Primary)  -     Adult Transesophageal Echo (TIANNA) W/ Cont if Necessary Per Protocol; Future    2. Nonrheumatic aortic valve stenosis  -     Adult Transesophageal Echo (TIANNA) W/ Cont if Necessary Per Protocol; Future    3. Primary hypertension         PLAN:    1.  Will repeat an echocardiogram in December 2024.  This will be 1 year from  prior.  The last 1 showed normal ejection fraction with moderate aortic stenosis with max/mean pressure gradient 43/26 mmHg and mild to moderate aortic insufficiency.  2.  Blood pressures under good control.  3.  Patient does not smoke.  4.  Encouraged the patient to continue to remain active and call us if any exertional symptoms.  We will need to continue to monitor this valve.  I do think likely he was born with a bicuspid aortic valve as his family also has had aortic valve surgery.  5.  Patient will continue to follow with the kidney doctors for his kidney disease.      Return in about 8 months (around 1/15/2025).     Patient was given instructions and counseling regarding his condition or for health maintenance advice. Please see specific information pulled into the AVS if appropriate.         Honorio Barakat MD   5/14/2024  15:33 EDT

## 2024-05-21 DIAGNOSIS — R12 HEARTBURN: ICD-10-CM

## 2024-05-22 RX ORDER — OMEPRAZOLE 40 MG/1
40 CAPSULE, DELAYED RELEASE ORAL DAILY
Qty: 30 CAPSULE | Refills: 5 | Status: SHIPPED | OUTPATIENT
Start: 2024-05-22

## 2024-06-21 ENCOUNTER — HOSPITAL ENCOUNTER (OUTPATIENT)
Dept: CARDIOLOGY | Facility: HOSPITAL | Age: 64
Discharge: HOME | End: 2024-06-21
Payer: MEDICARE

## 2024-06-21 DIAGNOSIS — I35.1 NONRHEUMATIC AORTIC VALVE INSUFFICIENCY: ICD-10-CM

## 2024-06-21 PROCEDURE — 93306 TTE W/DOPPLER COMPLETE: CPT

## 2024-06-24 LAB
AORTIC DIMENSIONLESS INDEX: 0.36 (DI)
ASCENDING AORTA: 3 CM
BH CV ECHO MEAS - ACS: 1.2 CM
BH CV ECHO MEAS - AI P1/2T: 532.7 MSEC
BH CV ECHO MEAS - AO MAX PG: 45.4 MMHG
BH CV ECHO MEAS - AO MEAN PG: 23 MMHG
BH CV ECHO MEAS - AO ROOT DIAM: 3.1 CM
BH CV ECHO MEAS - AO V2 MAX: 337 CM/SEC
BH CV ECHO MEAS - AO V2 VTI: 77.9 CM
BH CV ECHO MEAS - AVA(I,D): 1.24 CM2
BH CV ECHO MEAS - EDV(CUBED): 134.2 ML
BH CV ECHO MEAS - EDV(MOD-SP2): 85.5 ML
BH CV ECHO MEAS - EDV(MOD-SP4): 114 ML
BH CV ECHO MEAS - EF(MOD-BP): 67.3 %
BH CV ECHO MEAS - EF(MOD-SP2): 68.9 %
BH CV ECHO MEAS - EF(MOD-SP4): 64.9 %
BH CV ECHO MEAS - ESV(CUBED): 31.9 ML
BH CV ECHO MEAS - ESV(MOD-SP2): 26.6 ML
BH CV ECHO MEAS - ESV(MOD-SP4): 40 ML
BH CV ECHO MEAS - FS: 38.1 %
BH CV ECHO MEAS - IVS/LVPW: 0.82 CM
BH CV ECHO MEAS - IVSD: 0.96 CM
BH CV ECHO MEAS - LA DIMENSION: 3.7 CM
BH CV ECHO MEAS - LAT PEAK E' VEL: 10.3 CM/SEC
BH CV ECHO MEAS - LV MASS(C)D: 207.8 GRAMS
BH CV ECHO MEAS - LV MAX PG: 4.6 MMHG
BH CV ECHO MEAS - LV MEAN PG: 2 MMHG
BH CV ECHO MEAS - LV V1 MAX: 107 CM/SEC
BH CV ECHO MEAS - LV V1 VTI: 27.8 CM
BH CV ECHO MEAS - LVIDD: 5.1 CM
BH CV ECHO MEAS - LVIDS: 3.2 CM
BH CV ECHO MEAS - LVOT AREA: 3.5 CM2
BH CV ECHO MEAS - LVOT DIAM: 2.1 CM
BH CV ECHO MEAS - LVPWD: 1.18 CM
BH CV ECHO MEAS - MED PEAK E' VEL: 10.9 CM/SEC
BH CV ECHO MEAS - MR MAX PG: 61.8 MMHG
BH CV ECHO MEAS - MR MAX VEL: 393 CM/SEC
BH CV ECHO MEAS - MV A MAX VEL: 74.1 CM/SEC
BH CV ECHO MEAS - MV DEC SLOPE: 395.5 CM/SEC2
BH CV ECHO MEAS - MV DEC TIME: 0.22 SEC
BH CV ECHO MEAS - MV E MAX VEL: 92.1 CM/SEC
BH CV ECHO MEAS - MV E/A: 1.24
BH CV ECHO MEAS - MV MEAN PG: 1 MMHG
BH CV ECHO MEAS - MV P1/2T: 77 MSEC
BH CV ECHO MEAS - MV V2 VTI: 26.7 CM
BH CV ECHO MEAS - MVA(P1/2T): 2.9 CM2
BH CV ECHO MEAS - MVA(VTI): 3.6 CM2
BH CV ECHO MEAS - PA V2 MAX: 151 CM/SEC
BH CV ECHO MEAS - PULM A REVS DUR: 0.13 SEC
BH CV ECHO MEAS - PULM A REVS VEL: 27.6 CM/SEC
BH CV ECHO MEAS - PULM DIAS VEL: 43 CM/SEC
BH CV ECHO MEAS - PULM S/D: 1.41
BH CV ECHO MEAS - PULM SYS VEL: 60.6 CM/SEC
BH CV ECHO MEAS - QP/QS: 0.53
BH CV ECHO MEAS - RAP SYSTOLE: 5 MMHG
BH CV ECHO MEAS - RV MAX PG: 1.45 MMHG
BH CV ECHO MEAS - RV V1 MAX: 60.2 CM/SEC
BH CV ECHO MEAS - RV V1 VTI: 16.3 CM
BH CV ECHO MEAS - RVDD: 3.3 CM
BH CV ECHO MEAS - RVOT DIAM: 2 CM
BH CV ECHO MEAS - RVSP: 36.8 MMHG
BH CV ECHO MEAS - SV(LVOT): 96.3 ML
BH CV ECHO MEAS - SV(MOD-SP2): 58.9 ML
BH CV ECHO MEAS - SV(MOD-SP4): 74 ML
BH CV ECHO MEAS - SV(RVOT): 51.2 ML
BH CV ECHO MEAS - TAPSE (>1.6): 1.95 CM
BH CV ECHO MEAS - TR MAX PG: 31.8 MMHG
BH CV ECHO MEAS - TR MAX VEL: 282 CM/SEC
BH CV ECHO MEASUREMENTS AVERAGE E/E' RATIO: 8.69
BH CV XLRA - TDI S': 12.4 CM/SEC
IVRT: 56 MS
LEFT ATRIUM VOLUME INDEX: 16.1 ML/M2

## 2024-07-03 DIAGNOSIS — I10 HYPERTENSION, ESSENTIAL: ICD-10-CM

## 2024-07-03 RX ORDER — AMLODIPINE BESYLATE 10 MG/1
10 TABLET ORAL DAILY
Qty: 90 TABLET | Refills: 1 | Status: SHIPPED | OUTPATIENT
Start: 2024-07-03

## 2024-08-06 NOTE — PROGRESS NOTES
Primary Care Provider  Kuldeep Winters MD     Referring Provider  No ref. provider found     Chief Complaint  Cough, Shortness of Breath, Wheezing, Asthma-COPD overlap syndrome, and Follow-up    Subjective          History of Presenting Illness  Patient is a 64-year-old male, patient of Dr. Le's who presents for management of emphysema and lung nodule who presents for a follow-up visit today.  Patient states that since last visit his breathing is at baseline.  Patient states that he does get short of breath that is worse with exertion, mild to moderate in severity, and improved with rest.  Patient states that he is taking Breztri every day as prescribed and uses albuterol inhaler as needed. Patient is under the care of cardiologist, Dr. Lopes for history of aortic stenosis.  Patient denies fever, chills, night sweats, swollen glands in the head and neck, unintentional weight loss, hemoptysis, purulent sputum production, dysphagia, chest pain, palpitations, chest tightness, abdominal pain, nausea, vomiting, and diarrhea.  Patient also denies any myalgias, changes in sense of taste and/or smell, sore throat, any other coronavirus or flu-like symptoms.  Patient denies any leg swelling, orthopnea, paroxysmal nocturnal dyspnea.  Patient is able to perform activities of daily living.        Review of Systems     Family History   Problem Relation Age of Onset    Coronary artery disease Mother     Pulmonary embolism Mother         POST OP    Heart attack Brother     Prostate cancer Brother     COPD Brother     Alcohol abuse Maternal Grandfather         Social History     Socioeconomic History    Marital status:     Number of children: 2   Tobacco Use    Smoking status: Former     Current packs/day: 0.00     Average packs/day: 0.3 packs/day for 18.0 years (4.5 ttl pk-yrs)     Types: Cigarettes     Start date:      Quit date:      Years since quittin.6     Passive exposure: Past    Smokeless  tobacco: Former     Types: Chew   Vaping Use    Vaping status: Never Used   Substance and Sexual Activity    Alcohol use: Not Currently     Comment: non-drinker    Drug use: Never     Comment: none    Sexual activity: Defer        Past Medical History:   Diagnosis Date    Acute bronchitis, unspecified     Acute upper respiratory infection, unspecified     Anxiety disorder, unspecified     Anxiety with depression     Chronic kidney disease, stage III (moderate)     Colon polyp     Cough     Dizziness and giddiness     Emphysema, unspecified     Fatigue     GERD (gastroesophageal reflux disease)     Heartburn     Hypertension     Mild intermittent acute bronchitis with asthma with acute exacerbation     Nonrheumatic aortic (valve) stenosis     Vision problem         Immunization History   Administered Date(s) Administered    COVID-19 (PFIZER) BIVALENT 12+YRS 10/10/2022    COVID-19 (PFIZER) Purple Cap Monovalent 04/01/2021, 04/22/2021    COVID-19 F23 (PFIZER) 12YRS+ (COMIRNATY) 10/23/2023, 08/07/2024    Covid-19 (Pfizer) Gray Cap Monovalent 04/08/2022    Flu Vaccine Intradermal Quad 18-64YR 01/03/2013    Flu Vaccine Quad PF >36MO 11/27/2015    Fluzone  >6mos 11/01/2013    Fluzone (or Fluarix & Flulaval for VFC) >6mos 09/20/2021, 10/10/2022, 09/21/2023    Influenza, Unspecified 09/24/2020    PPD Test 04/24/2015    Pneumococcal Conjugate 20-Valent (PCV20) 01/16/2023    Pneumococcal Polysaccharide (PPSV23) 02/24/2020    Tdap 09/20/2021       Allergies   Allergen Reactions    Benadryl [Diphenhydramine] Palpitations          Current Outpatient Medications:     albuterol sulfate  (90 Base) MCG/ACT inhaler, Inhale 2 puffs Every 4 (Four) Hours As Needed for Wheezing or Shortness of Air., Disp: 18 g, Rfl: 5    amLODIPine (NORVASC) 10 MG tablet, Take 1 tablet by mouth once daily, Disp: 90 tablet, Rfl: 1    Breztri Aerosphere 160-9-4.8 MCG/ACT aerosol inhaler, Inhale 2 puffs 2 (Two) Times a Day for 30 days. Rinse mouth out  "after each use., Disp: 1 each, Rfl: 5    losartan (COZAAR) 50 MG tablet, Take 1 tablet by mouth Daily., Disp: 90 tablet, Rfl: 1    omeprazole (priLOSEC) 40 MG capsule, Take 1 capsule by mouth once daily, Disp: 30 capsule, Rfl: 5    doxazosin (CARDURA) 4 MG tablet, Take 1 tablet by mouth Every Night., Disp: 30 tablet, Rfl: 5     Objective     Physical Exam  Vital Signs:   WDWN, Alert, NAD.    HEENT:  PERRL, EOMI.  OP, nares clear, no sinus tenderness  Neck:  Supple, no JVD, no thyromegaly.  Lymph: no axillary, cervical, supraclavicular lymphadenopathy noted bilaterally  Chest:  good aeration, clear to auscultation bilaterally, tympanic to percussion bilaterally, no work of breathing noted  CV: RRR, no MGR, pulses 2+, equal.  Abd:  Soft, NT, ND, + BS, no HSM  EXT:  no clubbing, no cyanosis, no edema, no joint tenderness  Neuro:  A&Ox3, CN grossly intact, no focal deficits.  Skin: No rashes or lesions noted.    /70 (BP Location: Right arm, Patient Position: Sitting, Cuff Size: Large Adult)   Pulse 66   Temp 98.2 °F (36.8 °C) (Oral)   Resp 16   Ht 175.3 cm (69.02\")   Wt 86.2 kg (190 lb)   SpO2 95%   BMI 28.05 kg/m²         Result Review :   I have reviewed my last office visit note.    Procedures:           Assessment and Plan      Assessment:  1.  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  Alpha-1 antitrypsin with a normal genotype of M/M.  2. Lung nodule. 5 mm solitary lung nodule right upper lobe.  Stable since 07/2020 with no further follow up necessary per recent chest CT report completed on 7/25/2023.  3.  Dyspnea.        4.  Emphysema.  5.  Peripheral eosinophilia.    6.  Tobacco abuse with cigarettes in remission.  Not eligible for lung cancer screening as patient reports that he quit smoking in 2005.          Plan:  1.  Continue Breztri as prescribed and rinse mouth out after each use.  2.  Continue albuterol inhaler as needed. COPD action plan discussed with the patient the " office today.  3.  Patient is advised to avoid burning wood.  Discussed with patient that exposure to wood-burning smoke can cause asthma attacks and bronchitis and also can aggravate heart and lung disease.  4.  Lung nodule stable since July 2020 with no further follow-up necessary per recent chest CT report.  5.  Vaccination status: patient reports they are up-to-date with flu, pneumonia, and Covid vaccines.  Patient is advised to continue to follow CDC recommendations such as social distancing wearing a mask and washing hands for at least 20 seconds.  6.  Smoking status: patient is a former cigarette smoker.  Not eligible for lung cancer screening as patient reports that he quit smoking in 2005.  7.  Patient to call the office, 911, or go to the ER with new or worsening symptoms.  8.  Follow-up in 6 months, sooner if needed.             Follow Up   Return in about 6 months (around 2/26/2025) for in [place].  Patient was given instructions and counseling regarding his condition or for health maintenance advice. Please see specific information pulled into the AVS if appropriate.

## 2024-08-07 ENCOUNTER — CLINICAL SUPPORT (OUTPATIENT)
Dept: FAMILY MEDICINE CLINIC | Age: 64
End: 2024-08-07
Payer: MEDICARE

## 2024-08-07 DIAGNOSIS — Z23 IMMUNIZATION DUE: Primary | ICD-10-CM

## 2024-08-07 PROCEDURE — 91320 SARSCV2 VAC 30MCG TRS-SUC IM: CPT | Performed by: FAMILY MEDICINE

## 2024-08-07 PROCEDURE — 90480 ADMN SARSCOV2 VAC 1/ONLY CMP: CPT | Performed by: FAMILY MEDICINE

## 2024-08-26 ENCOUNTER — OFFICE VISIT (OUTPATIENT)
Dept: PULMONOLOGY | Facility: CLINIC | Age: 64
End: 2024-08-26
Payer: MEDICARE

## 2024-08-26 VITALS
TEMPERATURE: 98.2 F | SYSTOLIC BLOOD PRESSURE: 148 MMHG | WEIGHT: 190 LBS | OXYGEN SATURATION: 95 % | RESPIRATION RATE: 16 BRPM | BODY MASS INDEX: 28.14 KG/M2 | HEIGHT: 69 IN | DIASTOLIC BLOOD PRESSURE: 70 MMHG | HEART RATE: 66 BPM

## 2024-08-26 DIAGNOSIS — R06.00 DYSPNEA, UNSPECIFIED TYPE: ICD-10-CM

## 2024-08-26 DIAGNOSIS — J44.89 ASTHMA-COPD OVERLAP SYNDROME: Primary | ICD-10-CM

## 2024-08-26 DIAGNOSIS — D72.19 PERIPHERAL EOSINOPHILIA: ICD-10-CM

## 2024-08-26 DIAGNOSIS — F17.201 TOBACCO ABUSE, IN REMISSION: ICD-10-CM

## 2024-08-26 DIAGNOSIS — R91.1 LUNG NODULE: ICD-10-CM

## 2024-08-26 DIAGNOSIS — J43.9 PULMONARY EMPHYSEMA, UNSPECIFIED EMPHYSEMA TYPE: ICD-10-CM

## 2024-08-26 PROCEDURE — 1160F RVW MEDS BY RX/DR IN RCRD: CPT | Performed by: NURSE PRACTITIONER

## 2024-08-26 PROCEDURE — 3078F DIAST BP <80 MM HG: CPT | Performed by: NURSE PRACTITIONER

## 2024-08-26 PROCEDURE — 3077F SYST BP >= 140 MM HG: CPT | Performed by: NURSE PRACTITIONER

## 2024-08-26 PROCEDURE — 99214 OFFICE O/P EST MOD 30 MIN: CPT | Performed by: NURSE PRACTITIONER

## 2024-08-26 PROCEDURE — 1159F MED LIST DOCD IN RCRD: CPT | Performed by: NURSE PRACTITIONER

## 2024-08-26 RX ORDER — BUDESONIDE, GLYCOPYRROLATE, AND FORMOTEROL FUMARATE 160; 9; 4.8 UG/1; UG/1; UG/1
2 AEROSOL, METERED RESPIRATORY (INHALATION) 2 TIMES DAILY
COMMUNITY
Start: 2024-07-01 | End: 2024-08-26 | Stop reason: SDUPTHER

## 2024-08-26 RX ORDER — BUDESONIDE, GLYCOPYRROLATE, AND FORMOTEROL FUMARATE 160; 9; 4.8 UG/1; UG/1; UG/1
2 AEROSOL, METERED RESPIRATORY (INHALATION) 2 TIMES DAILY
Qty: 1 EACH | Refills: 5 | Status: SHIPPED | OUTPATIENT
Start: 2024-08-26 | End: 2024-09-25

## 2024-08-26 RX ORDER — ALBUTEROL SULFATE 90 UG/1
2 AEROSOL, METERED RESPIRATORY (INHALATION) EVERY 4 HOURS PRN
Qty: 18 G | Refills: 5 | Status: SHIPPED | OUTPATIENT
Start: 2024-08-26

## 2024-09-04 ENCOUNTER — TRANSCRIBE ORDERS (OUTPATIENT)
Dept: ADMINISTRATIVE | Facility: HOSPITAL | Age: 64
End: 2024-09-04
Payer: MEDICARE

## 2024-09-04 ENCOUNTER — LAB (OUTPATIENT)
Dept: LAB | Facility: HOSPITAL | Age: 64
End: 2024-09-04
Payer: MEDICARE

## 2024-09-04 DIAGNOSIS — N18.32 CHRONIC KIDNEY DISEASE (CKD) STAGE G3B/A1, MODERATELY DECREASED GLOMERULAR FILTRATION RATE (GFR) BETWEEN 30-44 ML/MIN/1.73 SQUARE METER AND ALBUMINURIA CREATININE RATIO LESS THAN 30 MG/G (CMS/H*: ICD-10-CM

## 2024-09-04 DIAGNOSIS — N18.32 CHRONIC KIDNEY DISEASE (CKD) STAGE G3B/A1, MODERATELY DECREASED GLOMERULAR FILTRATION RATE (GFR) BETWEEN 30-44 ML/MIN/1.73 SQUARE METER AND ALBUMINURIA CREATININE RATIO LESS THAN 30 MG/G (CMS/H*: Primary | ICD-10-CM

## 2024-09-04 LAB
ANION GAP SERPL CALCULATED.3IONS-SCNC: 9.8 MMOL/L (ref 5–15)
BACTERIA UR QL AUTO: NORMAL /HPF
BASOPHILS # BLD AUTO: 0.03 10*3/MM3 (ref 0–0.2)
BASOPHILS NFR BLD AUTO: 0.5 % (ref 0–1.5)
BILIRUB UR QL STRIP: NEGATIVE
BUN SERPL-MCNC: 13 MG/DL (ref 8–23)
BUN/CREAT SERPL: 7.8 (ref 7–25)
CALCIUM SPEC-SCNC: 9.3 MG/DL (ref 8.6–10.5)
CHLORIDE SERPL-SCNC: 106 MMOL/L (ref 98–107)
CLARITY UR: CLEAR
CO2 SERPL-SCNC: 26.2 MMOL/L (ref 22–29)
COLOR UR: YELLOW
CREAT SERPL-MCNC: 1.66 MG/DL (ref 0.76–1.27)
CREAT UR-MCNC: 88 MG/DL
DEPRECATED RDW RBC AUTO: 43.2 FL (ref 37–54)
EGFRCR SERPLBLD CKD-EPI 2021: 45.5 ML/MIN/1.73
EOSINOPHIL # BLD AUTO: 0.25 10*3/MM3 (ref 0–0.4)
EOSINOPHIL NFR BLD AUTO: 4 % (ref 0.3–6.2)
ERYTHROCYTE [DISTWIDTH] IN BLOOD BY AUTOMATED COUNT: 11.9 % (ref 12.3–15.4)
GLUCOSE SERPL-MCNC: 93 MG/DL (ref 65–99)
GLUCOSE UR STRIP-MCNC: NEGATIVE MG/DL
HCT VFR BLD AUTO: 41.8 % (ref 37.5–51)
HGB BLD-MCNC: 14.4 G/DL (ref 13–17.7)
HGB UR QL STRIP.AUTO: NEGATIVE
IMM GRANULOCYTES # BLD AUTO: 0.01 10*3/MM3 (ref 0–0.05)
IMM GRANULOCYTES NFR BLD AUTO: 0.2 % (ref 0–0.5)
KETONES UR QL STRIP: NEGATIVE
LEUKOCYTE ESTERASE UR QL STRIP.AUTO: NEGATIVE
LYMPHOCYTES # BLD AUTO: 1.85 10*3/MM3 (ref 0.7–3.1)
LYMPHOCYTES NFR BLD AUTO: 29.5 % (ref 19.6–45.3)
MCH RBC QN AUTO: 33.6 PG (ref 26.6–33)
MCHC RBC AUTO-ENTMCNC: 34.4 G/DL (ref 31.5–35.7)
MCV RBC AUTO: 97.7 FL (ref 79–97)
MONOCYTES # BLD AUTO: 0.49 10*3/MM3 (ref 0.1–0.9)
MONOCYTES NFR BLD AUTO: 7.8 % (ref 5–12)
NEUTROPHILS NFR BLD AUTO: 3.64 10*3/MM3 (ref 1.7–7)
NEUTROPHILS NFR BLD AUTO: 58 % (ref 42.7–76)
NITRITE UR QL STRIP: NEGATIVE
PH UR STRIP.AUTO: 5.5 [PH] (ref 5–8)
PHOSPHATE SERPL-MCNC: 3 MG/DL (ref 2.5–4.5)
PLATELET # BLD AUTO: 156 10*3/MM3 (ref 140–450)
PMV BLD AUTO: 10.9 FL (ref 6–12)
POTASSIUM SERPL-SCNC: 4.4 MMOL/L (ref 3.5–5.2)
PROT ?TM UR-MCNC: 5.7 MG/DL
PROT UR QL STRIP: NEGATIVE
PROT/CREAT UR: 0.06 MG/G{CREAT}
PTH-INTACT SERPL-MCNC: 70.9 PG/ML (ref 15–65)
RBC # BLD AUTO: 4.28 10*6/MM3 (ref 4.14–5.8)
RBC # UR STRIP: NORMAL /HPF
REF LAB TEST METHOD: NORMAL
SODIUM SERPL-SCNC: 142 MMOL/L (ref 136–145)
SP GR UR STRIP: 1.02 (ref 1–1.03)
SQUAMOUS #/AREA URNS HPF: NORMAL /HPF
UROBILINOGEN UR QL STRIP: NORMAL
WBC # UR STRIP: NORMAL /HPF
WBC NRBC COR # BLD AUTO: 6.27 10*3/MM3 (ref 3.4–10.8)

## 2024-09-04 PROCEDURE — 83970 ASSAY OF PARATHORMONE: CPT

## 2024-09-04 PROCEDURE — 84156 ASSAY OF PROTEIN URINE: CPT

## 2024-09-04 PROCEDURE — 80048 BASIC METABOLIC PNL TOTAL CA: CPT

## 2024-09-04 PROCEDURE — 84100 ASSAY OF PHOSPHORUS: CPT

## 2024-09-04 PROCEDURE — 36415 COLL VENOUS BLD VENIPUNCTURE: CPT

## 2024-09-04 PROCEDURE — 82570 ASSAY OF URINE CREATININE: CPT

## 2024-09-04 PROCEDURE — 85025 COMPLETE CBC W/AUTO DIFF WBC: CPT

## 2024-09-04 PROCEDURE — 81001 URINALYSIS AUTO W/SCOPE: CPT

## 2024-09-12 DIAGNOSIS — I10 HYPERTENSION, ESSENTIAL: ICD-10-CM

## 2024-09-13 RX ORDER — DOXAZOSIN 4 MG/1
4 TABLET ORAL NIGHTLY
Qty: 90 TABLET | Refills: 0 | Status: SHIPPED | OUTPATIENT
Start: 2024-09-13

## 2024-10-01 DIAGNOSIS — I10 HYPERTENSION, ESSENTIAL: ICD-10-CM

## 2024-10-01 DIAGNOSIS — R12 HEARTBURN: ICD-10-CM

## 2024-10-01 RX ORDER — OMEPRAZOLE 40 MG/1
40 CAPSULE, DELAYED RELEASE ORAL DAILY
Qty: 90 CAPSULE | Refills: 0 | OUTPATIENT
Start: 2024-10-01

## 2024-10-01 RX ORDER — AMLODIPINE BESYLATE 10 MG/1
10 TABLET ORAL DAILY
Qty: 90 TABLET | Refills: 0 | OUTPATIENT
Start: 2024-10-01

## 2024-10-24 ENCOUNTER — OFFICE VISIT (OUTPATIENT)
Dept: FAMILY MEDICINE CLINIC | Age: 64
End: 2024-10-24
Payer: MEDICARE

## 2024-10-24 VITALS
TEMPERATURE: 97.6 F | HEART RATE: 63 BPM | BODY MASS INDEX: 28.11 KG/M2 | DIASTOLIC BLOOD PRESSURE: 62 MMHG | SYSTOLIC BLOOD PRESSURE: 130 MMHG | WEIGHT: 189.8 LBS | HEIGHT: 69 IN

## 2024-10-24 DIAGNOSIS — N18.31 STAGE 3A CHRONIC KIDNEY DISEASE: ICD-10-CM

## 2024-10-24 DIAGNOSIS — I10 HYPERTENSION, ESSENTIAL: ICD-10-CM

## 2024-10-24 DIAGNOSIS — I35.0 NONRHEUMATIC AORTIC VALVE STENOSIS: ICD-10-CM

## 2024-10-24 DIAGNOSIS — Z00.00 MEDICARE ANNUAL WELLNESS VISIT, SUBSEQUENT: Primary | ICD-10-CM

## 2024-10-24 DIAGNOSIS — Z23 ENCOUNTER FOR IMMUNIZATION: ICD-10-CM

## 2024-10-24 PROCEDURE — 3075F SYST BP GE 130 - 139MM HG: CPT | Performed by: FAMILY MEDICINE

## 2024-10-24 PROCEDURE — 90662 IIV NO PRSV INCREASED AG IM: CPT | Performed by: FAMILY MEDICINE

## 2024-10-24 PROCEDURE — 3078F DIAST BP <80 MM HG: CPT | Performed by: FAMILY MEDICINE

## 2024-10-24 PROCEDURE — G0439 PPPS, SUBSEQ VISIT: HCPCS | Performed by: FAMILY MEDICINE

## 2024-10-24 PROCEDURE — 1170F FXNL STATUS ASSESSED: CPT | Performed by: FAMILY MEDICINE

## 2024-10-24 PROCEDURE — G0008 ADMIN INFLUENZA VIRUS VAC: HCPCS | Performed by: FAMILY MEDICINE

## 2024-10-24 PROCEDURE — 99213 OFFICE O/P EST LOW 20 MIN: CPT | Performed by: FAMILY MEDICINE

## 2024-10-24 PROCEDURE — 1126F AMNT PAIN NOTED NONE PRSNT: CPT | Performed by: FAMILY MEDICINE

## 2024-10-24 NOTE — PROGRESS NOTES
Subjective   The ABCs of the Annual Wellness Visit  Medicare Wellness Visit      Chad Oneill is a 64 y.o. patient who presents for a Medicare Wellness Visit.    The following portions of the patient's history were reviewed and   updated as appropriate: allergies, current medications, past family history, past medical history, past social history, past surgical history, and problem list.    Compared to one year ago, the patient's physical   health is better.  Compared to one year ago, the patient's mental   health is the same.    Recent Hospitalizations:  He was not admitted to the hospital during the last year.     Current Medical Providers:  Patient Care Team:  Kuldeep Winters MD as PCP - General (Family Medicine)    Outpatient Medications Prior to Visit   Medication Sig Dispense Refill    albuterol sulfate  (90 Base) MCG/ACT inhaler Inhale 2 puffs Every 4 (Four) Hours As Needed for Wheezing or Shortness of Air. 18 g 5    amLODIPine (NORVASC) 10 MG tablet Take 1 tablet by mouth once daily 90 tablet 1    doxazosin (CARDURA) 4 MG tablet TAKE 1 TABLET BY MOUTH ONCE DAILY AT NIGHT 90 tablet 0    losartan (COZAAR) 50 MG tablet Take 1 tablet by mouth Daily. 90 tablet 1    omeprazole (priLOSEC) 40 MG capsule Take 1 capsule by mouth once daily 30 capsule 5     No facility-administered medications prior to visit.     No opioid medication identified on active medication list. I have reviewed chart for other potential  high risk medication/s and harmful drug interactions in the elderly.      Aspirin is not on active medication list.  Aspirin use is not indicated based on review of current medical condition/s. Risk of harm outweighs potential benefits.  .    Patient Active Problem List   Diagnosis    Stage 3a chronic kidney disease    Hypertension, essential    Nonrheumatic aortic valve stenosis    Lung nodule    Medicare annual wellness visit, subsequent    Pulmonary emphysema    Elevated LFTs    Problem  "related to lifestyle, unspecified    Need for Tdap vaccination    Aortic stenosis    Dyspnea    Tobacco abuse, in remission    Peripheral eosinophilia    Abnormal liver ultrasound    Nonrheumatic aortic valve insufficiency    Gastroesophageal reflux disease    Subacute cough    Leukocytosis    Acute kidney injury    Abnormal urine findings    IgA nephropathy    Anxiety    Asthma-COPD overlap syndrome    Overweight (BMI 25.0-29.9)    Heartburn     Advance Care Planning Advance Directive is not on file.  ACP discussion was held with the patient during this visit. Patient does not have an advance directive, declines further assistance.            Objective   Vitals:    10/24/24 1436   BP: 130/62   BP Location: Left arm   Patient Position: Sitting   Pulse: 63   Temp: 97.6 °F (36.4 °C)   TempSrc: Temporal   Weight: 86.1 kg (189 lb 12.8 oz)   Height: 175.3 cm (69.02\")       Estimated body mass index is 28.01 kg/m² as calculated from the following:    Height as of this encounter: 175.3 cm (69.02\").    Weight as of this encounter: 86.1 kg (189 lb 12.8 oz).    BMI is >= 25 and <30. (Overweight) The following options were offered after discussion;: He is aware and making efforts at keeping weight under control       Does the patient have evidence of cognitive impairment? No                                                                                                Health  Risk Assessment    Smoking Status:  Social History     Tobacco Use   Smoking Status Former    Current packs/day: 0.00    Average packs/day: 0.3 packs/day for 18.0 years (4.5 ttl pk-yrs)    Types: Cigarettes    Start date:     Quit date:     Years since quittin.8    Passive exposure: Past   Smokeless Tobacco Former    Types: Chew     Alcohol Consumption:  Social History     Substance and Sexual Activity   Alcohol Use Not Currently    Comment: non-drinker       Fall Risk Screen  STEADI Fall Risk Assessment was completed, and patient is at LOW " risk for falls.Assessment completed on:10/24/2024    Depression Screening:      10/24/2024     2:35 PM   PHQ-2/PHQ-9 Depression Screening   Little interest or pleasure in doing things Not at all   Feeling down, depressed, or hopeless Not at all     Health Habits and Functional and Cognitive Screening:      10/24/2024     2:35 PM   Functional & Cognitive Status   Do you have difficulty preparing food and eating? No   Do you have difficulty bathing yourself, getting dressed or grooming yourself? No   Do you have difficulty using the toilet? No   Do you have difficulty moving around from place to place? No   Do you have trouble with steps or getting out of a bed or a chair? No   Current Diet Well Balanced Diet   Dental Exam Not up to date   Eye Exam Up to date   Exercise (times per week) 3 times per week   Current Exercises Include Yard Work   Do you need help using the phone?  No   Are you deaf or do you have serious difficulty hearing?  No   Do you need help to go to places out of walking distance? No   Do you need help shopping? No   Do you need help preparing meals?  No   Do you need help with housework?  No   Do you need help with laundry? No   Do you need help taking your medications? No   Do you need help managing money? No   Do you ever drive or ride in a car without wearing a seat belt? No   Have you felt unusual stress, anger or loneliness in the last month? No   Who do you live with? Spouse   If you need help, do you have trouble finding someone available to you? No   Have you been bothered in the last four weeks by sexual problems? No   Do you have difficulty concentrating, remembering or making decisions? No           Age-appropriate Screening Schedule:  Refer to the list below for future screening recommendations based on patient's age, sex and/or medical conditions. Orders for these recommended tests are listed in the plan section. The patient has been provided with a written plan.    Health Maintenance  List  Health Maintenance   Topic Date Due    ZOSTER VACCINE (1 of 2) Never done    LIPID PANEL  10/10/2023    BMI FOLLOWUP  10/23/2024    ANNUAL WELLNESS VISIT  10/24/2025    COLORECTAL CANCER SCREENING  06/10/2030    TDAP/TD VACCINES (2 - Td or Tdap) 09/20/2031    HEPATITIS C SCREENING  Completed    COVID-19 Vaccine  Completed    INFLUENZA VACCINE  Completed    Pneumococcal Vaccine 65+  Completed    AAA SCREEN (ONE-TIME)  Completed                                                                                                                                                CMS Preventative Services Quick Reference  Risk Factors Identified During Encounter  None Identified    The above risks/problems have been discussed with the patient.  Pertinent information has been shared with the patient in the After Visit Summary.  An After Visit Summary and PPPS were made available to the patient.    Follow Up:   Next Medicare Wellness visit to be scheduled in 1 year.         Additional E&M Note during same encounter follows:  Patient has additional, significant, and separately identifiable condition(s)/problem(s) that require work above and beyond the Medicare Wellness Visit     Chief Complaint  Medicare Wellness-subsequent    Subjective   HPI  Chad is also being seen today for additional medical problem/s.    Please see the student note from today.  Patient history, exam, and assessment independently verified.      He is followed by nephrology for his CKD from nephrosclerosis and IgA nephropathy demonstrated by biopsy.  Last office visit September 23 reviewed.    Also followed by pulmonology for emphysema and COPD/asthma overlap syndrome last visit August 26 reviewed.  Treated with Breztri and as needed albuterol    Sees cardiology for follow-up on his aortic stenosis.  Echocardiogram June 24 revealed EF of 60% with mild to moderate aortic valve stenosis max/mean pressure gradient 45/23 peak velocity 3.4 m/s. Mild to  "moderate aortic insufficiency.  Essentially stable.          Objective   Vital Signs:  /62 (BP Location: Left arm, Patient Position: Sitting)   Pulse 63   Temp 97.6 °F (36.4 °C) (Temporal)   Ht 175.3 cm (69.02\")   Wt 86.1 kg (189 lb 12.8 oz)   BMI 28.01 kg/m²   Physical Exam  Vitals and nursing note reviewed.   Constitutional:       General: He is not in acute distress.     Appearance: Normal appearance. He is obese.   HENT:      Right Ear: Tympanic membrane and ear canal normal.      Left Ear: Tympanic membrane and ear canal normal.      Mouth/Throat:      Mouth: Mucous membranes are moist.      Pharynx: Oropharynx is clear. No oropharyngeal exudate.   Eyes:      General: No scleral icterus.     Conjunctiva/sclera: Conjunctivae normal.   Neck:      Vascular: No carotid bruit.   Cardiovascular:      Rate and Rhythm: Normal rate and regular rhythm.      Heart sounds: Murmur heard.      No friction rub. No gallop.   Pulmonary:      Effort: No respiratory distress.      Breath sounds: No wheezing or rales.   Abdominal:      General: Bowel sounds are normal.      Palpations: Abdomen is soft. There is no mass.      Tenderness: There is no abdominal tenderness. There is no guarding or rebound.   Musculoskeletal:         General: No swelling.      Right lower leg: No edema.      Left lower leg: No edema.   Lymphadenopathy:      Cervical: No cervical adenopathy.   Skin:     Coloration: Skin is not jaundiced.      Findings: No lesion.   Neurological:      General: No focal deficit present.      Mental Status: He is alert and oriented to person, place, and time.   Psychiatric:         Mood and Affect: Mood normal.         Behavior: Behavior normal.         Thought Content: Thought content normal.         Judgment: Judgment normal.                       Assessment and Plan               Medicare annual wellness visit, subsequent  We reviewed the preventive service recommendations and created an individualized " handout  Hypertension, essential  Blood pressure is doing okay continue current regimen  Stage 3a chronic kidney disease  Kidney function has returned to baseline.  Follow with nephrology as recommended  Nonrheumatic aortic valve stenosis  Continue to follow with cardiology as recommended  Encounter for immunization  Flu shot today.    Orders Placed This Encounter   Procedures    Fluzone High-Dose 65+yrs             Follow Up   No follow-ups on file.  Patient was given instructions and counseling regarding his condition or for health maintenance advice. Please see specific information pulled into the AVS if appropriate.

## 2024-11-20 DIAGNOSIS — R12 HEARTBURN: ICD-10-CM

## 2024-11-20 RX ORDER — OMEPRAZOLE 40 MG/1
40 CAPSULE, DELAYED RELEASE ORAL DAILY
Qty: 90 CAPSULE | Refills: 1 | Status: SHIPPED | OUTPATIENT
Start: 2024-11-20

## 2024-12-06 DIAGNOSIS — I10 HYPERTENSION, ESSENTIAL: ICD-10-CM

## 2024-12-06 RX ORDER — DOXAZOSIN 4 MG/1
4 TABLET ORAL NIGHTLY
Qty: 90 TABLET | Refills: 1 | Status: SHIPPED | OUTPATIENT
Start: 2024-12-06

## 2024-12-06 RX ORDER — LOSARTAN POTASSIUM 50 MG/1
50 TABLET ORAL DAILY
Qty: 90 TABLET | Refills: 1 | Status: SHIPPED | OUTPATIENT
Start: 2024-12-06

## 2024-12-26 DIAGNOSIS — I10 HYPERTENSION, ESSENTIAL: ICD-10-CM

## 2024-12-26 RX ORDER — AMLODIPINE BESYLATE 10 MG/1
10 TABLET ORAL DAILY
Qty: 90 TABLET | Refills: 1 | Status: SHIPPED | OUTPATIENT
Start: 2024-12-26

## 2024-12-31 RX ORDER — ALBUTEROL SULFATE 90 UG/1
INHALANT RESPIRATORY (INHALATION)
Qty: 9 G | Refills: 0 | Status: SHIPPED | OUTPATIENT
Start: 2024-12-31

## 2025-01-03 ENCOUNTER — OFFICE VISIT (OUTPATIENT)
Dept: FAMILY MEDICINE CLINIC | Age: 65
End: 2025-01-03
Payer: MEDICARE

## 2025-01-03 VITALS
SYSTOLIC BLOOD PRESSURE: 135 MMHG | BODY MASS INDEX: 28.23 KG/M2 | OXYGEN SATURATION: 96 % | DIASTOLIC BLOOD PRESSURE: 83 MMHG | WEIGHT: 190.6 LBS | HEIGHT: 69 IN | HEART RATE: 75 BPM | TEMPERATURE: 98.4 F

## 2025-01-03 DIAGNOSIS — J20.9 COPD WITH ACUTE BRONCHITIS: Primary | ICD-10-CM

## 2025-01-03 DIAGNOSIS — J44.0 COPD WITH ACUTE BRONCHITIS: Primary | ICD-10-CM

## 2025-01-03 PROCEDURE — 1159F MED LIST DOCD IN RCRD: CPT | Performed by: NURSE PRACTITIONER

## 2025-01-03 PROCEDURE — 1160F RVW MEDS BY RX/DR IN RCRD: CPT | Performed by: NURSE PRACTITIONER

## 2025-01-03 PROCEDURE — 3075F SYST BP GE 130 - 139MM HG: CPT | Performed by: NURSE PRACTITIONER

## 2025-01-03 PROCEDURE — 3079F DIAST BP 80-89 MM HG: CPT | Performed by: NURSE PRACTITIONER

## 2025-01-03 PROCEDURE — 1126F AMNT PAIN NOTED NONE PRSNT: CPT | Performed by: NURSE PRACTITIONER

## 2025-01-03 PROCEDURE — 99213 OFFICE O/P EST LOW 20 MIN: CPT | Performed by: NURSE PRACTITIONER

## 2025-01-03 RX ORDER — AZITHROMYCIN 250 MG/1
TABLET, FILM COATED ORAL
Qty: 6 TABLET | Refills: 0 | Status: SHIPPED | OUTPATIENT
Start: 2025-01-03

## 2025-01-03 RX ORDER — PREDNISONE 10 MG/1
TABLET ORAL
Qty: 35 TABLET | Refills: 0 | Status: SHIPPED | OUTPATIENT
Start: 2025-01-03 | End: 2025-01-17

## 2025-01-03 NOTE — PROGRESS NOTES
Chief Complaint  Chad Oneill presents to [facility] for Nasal Congestion (Congestion, cough, possible bronchitis going on for a couple months )    Subjective          History of Present Illness    Chad is here today with c/o symptoms that started a couple of months ago. Notes symptoms wax and wane. Notes congestion, cough. Symptoms aggravated with use of his wood stove. Declines covid and flu testing. Notes symptoms similar to when he has had bronchitis in the past. Has taken tylenol for symptoms. Medical history significant for COPD/asthma overlap syndrome, emphysema. He has used in Albuterol inhaler.     Review of Systems      Allergies   Allergen Reactions    Benadryl [Diphenhydramine] Palpitations      Past Medical History:   Diagnosis Date    Acute bronchitis, unspecified     Acute upper respiratory infection, unspecified     Anxiety disorder, unspecified     Anxiety with depression     Chronic kidney disease, stage III (moderate)     Colon polyp     Cough     Dizziness and giddiness     Emphysema, unspecified     Fatigue     GERD (gastroesophageal reflux disease)     Heartburn     Hypertension     Mild intermittent acute bronchitis with asthma with acute exacerbation     Nonrheumatic aortic (valve) stenosis     Vision problem      Current Outpatient Medications   Medication Sig Dispense Refill    albuterol sulfate  (90 Base) MCG/ACT inhaler INHALE 2 PUFFS BY MOUTH EVERY 4 HOURS AS NEEDED FOR WHEEZING FOR SHORTNESS OF BREATH 9 g 0    amLODIPine (NORVASC) 10 MG tablet Take 1 tablet by mouth once daily 90 tablet 1    doxazosin (CARDURA) 4 MG tablet TAKE 1 TABLET BY MOUTH ONCE DAILY AT NIGHT 90 tablet 1    losartan (COZAAR) 50 MG tablet Take 1 tablet by mouth once daily 90 tablet 1    omeprazole (priLOSEC) 40 MG capsule Take 1 capsule by mouth once daily (Patient taking differently: Take 1 capsule by mouth As Needed.) 90 capsule 1    azithromycin (Zithromax Z-Juan Pablo)  250 MG tablet Take 2 tablets by mouth on day 1, then 1 tablet daily on days 2-5 6 tablet 0    predniSONE (DELTASONE) 10 MG tablet Take 2 tablets by mouth 2 (Two) Times a Day for 5 days, THEN 1 tablet 2 (Two) Times a Day for 5 days, THEN 1 tablet Daily for 5 days. 35 tablet 0     No current facility-administered medications for this visit.     Past Surgical History:   Procedure Laterality Date    CIRCUMCISION      COLONOSCOPY W/ BIOPSIES AND POLYPECTOMY      ENDOSCOPY  2013    HAND SURGERY Right     INJURY TO R HAND    KNEE ARTHROSCOPY Left       Social History     Tobacco Use    Smoking status: Former     Current packs/day: 0.00     Average packs/day: 0.3 packs/day for 18.0 years (4.5 ttl pk-yrs)     Types: Cigarettes     Start date:      Quit date:      Years since quittin.0     Passive exposure: Past    Smokeless tobacco: Former     Types: Chew   Vaping Use    Vaping status: Never Used   Substance Use Topics    Alcohol use: Not Currently     Comment: non-drinker    Drug use: Never     Comment: none     Family History   Problem Relation Age of Onset    Coronary artery disease Mother     Pulmonary embolism Mother         POST OP    Heart attack Brother     Prostate cancer Brother     COPD Brother     Alcohol abuse Maternal Grandfather      Health Maintenance Due   Topic Date Due    ZOSTER VACCINE (1 of 2) Never done    LIPID PANEL  10/10/2023    COVID-19 Vaccine (2024-25 season) 10/02/2024      Immunization History   Administered Date(s) Administered    COVID-19 (PFIZER) 12YRS+ (COMIRNATY) 10/23/2023, 2024    COVID-19 (PFIZER) BIVALENT 12+YRS 10/10/2022    COVID-19 (PFIZER) Purple Cap Monovalent 2021, 2021    Covid-19 (Pfizer) Gray Cap Monovalent 2022    Flu Vaccine Intradermal Quad 18-64YR 2013    Flu Vaccine Quad PF >36MO 2015    Fluzone  >6mos 2013    Fluzone (or Fluarix & Flulaval for VFC) >6mos 2021, 10/10/2022, 2023    Fluzone  "High-Dose 65+YRS 10/24/2024    Influenza, Unspecified 09/24/2020    PPD Test 04/24/2015    Pneumococcal Conjugate 20-Valent (PCV20) 01/16/2023    Pneumococcal Polysaccharide (PPSV23) 02/24/2020    Tdap 09/20/2021        Objective     Vitals:    01/03/25 1245   BP: 135/83   Pulse: 75   Temp: 98.4 °F (36.9 °C)   TempSrc: Oral   SpO2: 96%   Weight: 86.5 kg (190 lb 9.6 oz)   Height: 175.3 cm (69.02\")     Body mass index is 28.13 kg/m².              No results found.    Physical Exam  Vitals reviewed.   Constitutional:       General: He is not in acute distress.     Appearance: Normal appearance. He is well-developed.   HENT:      Head: Normocephalic and atraumatic.      Right Ear: Tympanic membrane and ear canal normal.      Left Ear: Tympanic membrane and ear canal normal.      Nose: Congestion present.      Mouth/Throat:      Mouth: Mucous membranes are moist.      Comments: Uvula midline, PND  Eyes:      Extraocular Movements: Extraocular movements intact.      Pupils: Pupils are equal, round, and reactive to light.   Cardiovascular:      Rate and Rhythm: Normal rate and regular rhythm.   Pulmonary:      Effort: Pulmonary effort is normal.      Breath sounds: Normal breath sounds.   Neurological:      Mental Status: He is alert and oriented to person, place, and time.   Psychiatric:         Mood and Affect: Mood and affect normal.           Result Review :                               Assessment and Plan      Assessment & Plan  COPD with acute bronchitis    Will treat with oral steroid course and rescue inhaler. Denies need for refill on rescue inhaler. If symptoms persist, will then start antibiotic treatment.   Orders:    predniSONE (DELTASONE) 10 MG tablet; Take 2 tablets by mouth 2 (Two) Times a Day for 5 days, THEN 1 tablet 2 (Two) Times a Day for 5 days, THEN 1 tablet Daily for 5 days.    azithromycin (Zithromax Z-Juan Pablo) 250 MG tablet; Take 2 tablets by mouth on day 1, then 1 tablet daily on days " 2-5              Follow Up     Return for As needed for persistent or worsening symptoms.

## 2025-01-21 ENCOUNTER — OFFICE VISIT (OUTPATIENT)
Dept: CARDIOLOGY | Facility: CLINIC | Age: 65
End: 2025-01-21
Payer: MEDICARE

## 2025-01-21 VITALS
SYSTOLIC BLOOD PRESSURE: 140 MMHG | DIASTOLIC BLOOD PRESSURE: 72 MMHG | HEART RATE: 72 BPM | BODY MASS INDEX: 27.63 KG/M2 | WEIGHT: 193 LBS | HEIGHT: 70 IN

## 2025-01-21 DIAGNOSIS — I10 PRIMARY HYPERTENSION: ICD-10-CM

## 2025-01-21 DIAGNOSIS — I35.0 NONRHEUMATIC AORTIC VALVE STENOSIS: Primary | ICD-10-CM

## 2025-01-21 DIAGNOSIS — I35.1 NONRHEUMATIC AORTIC VALVE INSUFFICIENCY: ICD-10-CM

## 2025-01-21 PROCEDURE — 3078F DIAST BP <80 MM HG: CPT | Performed by: INTERNAL MEDICINE

## 2025-01-21 PROCEDURE — 99214 OFFICE O/P EST MOD 30 MIN: CPT | Performed by: INTERNAL MEDICINE

## 2025-01-21 PROCEDURE — 3077F SYST BP >= 140 MM HG: CPT | Performed by: INTERNAL MEDICINE

## 2025-01-21 RX ORDER — BUDESONIDE, GLYCOPYRROLATE, AND FORMOTEROL FUMARATE 160; 9; 4.8 UG/1; UG/1; UG/1
AEROSOL, METERED RESPIRATORY (INHALATION)
COMMUNITY
Start: 2024-12-30

## 2025-01-22 NOTE — PROGRESS NOTES
Chief Complaint  Hypertension, Follow-up (Doing ok), and Shortness of Breath    Subjective        Chad Oneill presents to [facility]  History of present illness:    Patient states overall he is doing pretty well.  He notes no exertional chest pain.  He denies any edema.  He states his palpitations are not bothering him as long as he limits his caffeine.      Past Medical History:   Diagnosis Date    Acute bronchitis, unspecified     Acute upper respiratory infection, unspecified     Anxiety disorder, unspecified     Anxiety with depression     Chronic kidney disease, stage III (moderate)     Colon polyp     Cough     Dizziness and giddiness     Emphysema, unspecified     Fatigue     GERD (gastroesophageal reflux disease)     Heartburn     Hypertension     Mild intermittent acute bronchitis with asthma with acute exacerbation     Nonrheumatic aortic (valve) stenosis     Vision problem          Past Surgical History:   Procedure Laterality Date    CIRCUMCISION      COLONOSCOPY W/ BIOPSIES AND POLYPECTOMY      ENDOSCOPY  2013    HAND SURGERY Right     INJURY TO R HAND    KNEE ARTHROSCOPY Left           Social History     Socioeconomic History    Marital status:     Number of children: 2   Tobacco Use    Smoking status: Former     Current packs/day: 0.00     Average packs/day: 0.3 packs/day for 18.0 years (4.5 ttl pk-yrs)     Types: Cigarettes     Start date:      Quit date:      Years since quittin.0     Passive exposure: Past    Smokeless tobacco: Former     Types: Chew   Vaping Use    Vaping status: Never Used   Substance and Sexual Activity    Alcohol use: Not Currently     Comment: non-drinker    Drug use: Never     Comment: none    Sexual activity: Defer         Family History   Problem Relation Age of Onset    Coronary artery disease Mother     Pulmonary embolism Mother         POST OP    Heart attack Brother     Prostate cancer Brother     COPD Brother   "   Alcohol abuse Maternal Grandfather           Allergies   Allergen Reactions    Benadryl [Diphenhydramine] Palpitations            Current Outpatient Medications:     albuterol sulfate  (90 Base) MCG/ACT inhaler, INHALE 2 PUFFS BY MOUTH EVERY 4 HOURS AS NEEDED FOR WHEEZING FOR SHORTNESS OF BREATH, Disp: 9 g, Rfl: 0    amLODIPine (NORVASC) 10 MG tablet, Take 1 tablet by mouth once daily, Disp: 90 tablet, Rfl: 1    Breztri Aerosphere 160-9-4.8 MCG/ACT aerosol inhaler, INHALE 2 PUFFS BY MOUTH TWICE DAILY RINSE MOUTH AFTER EACH USE, Disp: , Rfl:     doxazosin (CARDURA) 4 MG tablet, TAKE 1 TABLET BY MOUTH ONCE DAILY AT NIGHT, Disp: 90 tablet, Rfl: 1    losartan (COZAAR) 50 MG tablet, Take 1 tablet by mouth once daily, Disp: 90 tablet, Rfl: 1    omeprazole (priLOSEC) 40 MG capsule, Take 1 capsule by mouth once daily (Patient taking differently: Take 1 capsule by mouth As Needed.), Disp: 90 capsule, Rfl: 1      ROS:  Cardiac review of systems negative.    Objective     /72   Pulse 72   Ht 177.8 cm (70\")   Wt 87.5 kg (193 lb)   BMI 27.69 kg/m²       General Appearance:   well developed  well nourished  HENT:   oropharynx moist  lips not cyanotic  Respiratory:  no respiratory distress  normal breath sounds  no rales  Cardiovascular:  no jugular venous distention  regular rhythm  S1 normal, S2 normal  no S3, no S4   no murmur  no rub, no thrill  No carotid bruit  pedal pulses normal  lower extremity edema: none    Musculoskeletal:  no clubbing of fingers.   normocephalic, head atraumatic  Skin:   warm, dry  Psychiatric:  judgement and insight appropriate  normal mood and affect    ECHO:  Results for orders placed during the hospital encounter of 06/21/24    Adult Transthoracic Echo Complete w/ Color, Spectral and Contrast if necessary per protocol    Interpretation Summary    Left ventricular ejection fraction appears to be 56 - 60%.    The left ventricular cavity is mildly dilated.    Left ventricular " diastolic function was normal.    Mild to moderate aortic valve stenosis is present with max/mean pressure gradient 45/23 mmHg and a peak systolic velocity of 3.4 m/s..  Mild to moderate aortic insufficiency.    Estimated right ventricular systolic pressure from tricuspid regurgitation is mildly elevated (35-45 mmHg).    No significant change in the aortic valve from last echocardiogram.    STRESS:    CATH:  No results found for this or any previous visit.    BMP:     Glucose   Date Value Ref Range Status   09/04/2024 93 65 - 99 mg/dL Final     BUN   Date Value Ref Range Status   09/04/2024 13 8 - 23 mg/dL Final     Creatinine   Date Value Ref Range Status   09/04/2024 1.66 (H) 0.76 - 1.27 mg/dL Final     Sodium   Date Value Ref Range Status   09/04/2024 142 136 - 145 mmol/L Final     Potassium   Date Value Ref Range Status   09/04/2024 4.4 3.5 - 5.2 mmol/L Final     Chloride   Date Value Ref Range Status   09/04/2024 106 98 - 107 mmol/L Final     CO2   Date Value Ref Range Status   09/04/2024 26.2 22.0 - 29.0 mmol/L Final     Calcium   Date Value Ref Range Status   09/04/2024 9.3 8.6 - 10.5 mg/dL Final     BUN/Creatinine Ratio   Date Value Ref Range Status   09/04/2024 7.8 7.0 - 25.0 Final     Anion Gap   Date Value Ref Range Status   09/04/2024 9.8 5.0 - 15.0 mmol/L Final     eGFR   Date Value Ref Range Status   09/04/2024 45.5 (L) >60.0 mL/min/1.73 Final     LIPIDS:  Total Cholesterol   Date Value Ref Range Status   10/10/2022 192 0 - 200 mg/dL Final     Triglycerides   Date Value Ref Range Status   10/10/2022 296 (H) 0 - 150 mg/dL Final     HDL Cholesterol   Date Value Ref Range Status   10/10/2022 36 (L) 40 - 60 mg/dL Final     LDL Cholesterol    Date Value Ref Range Status   10/10/2022 105 (H) 0 - 100 mg/dL Final     VLDL Cholesterol   Date Value Ref Range Status   10/10/2022 51 (H) 5 - 40 mg/dL Final     LDL/HDL Ratio   Date Value Ref Range Status   10/10/2022 2.69  Final         Procedures              ASSESSMENT:  Diagnoses and all orders for this visit:    1. Nonrheumatic aortic valve stenosis (Primary)    2. Primary hypertension    3. Nonrheumatic aortic valve insufficiency         PLAN:    1.  Will repeat an echocardiogram in December 2025 keeping an eye on his moderate aortic stenosis and mild to moderate aortic insufficiency.  2.  Blood pressure is borderline but last 2 times were fine.  We will continue to monitor.  3.  Patient will continue to follow with the nephrologist.  4.  Encouraged the patient to continue to remain active and call us if any exertional chest pain.      Return in about 9 months (around 10/21/2025) for mathew abreu.     Patient was given instructions and counseling regarding his condition or for health maintenance advice. Please see specific information pulled into the AVS if appropriate.         Honorio Barakat MD   1/21/2025  21:05 EST

## 2025-01-26 NOTE — PATIENT INSTRUCTIONS
Chronic Obstructive Pulmonary Disease Exacerbation    Chronic obstructive pulmonary disease (COPD) is a long-term (chronic) lung problem.  When you have COPD, it can feel harder to breathe in or out.  COPD exacerbation is a flare-up of symptoms when breathing gets worse and more treatment may be needed. Without treatment, flare-ups can be life-threatening. If they happen often, your lungs can become more damaged.  What are the causes?  Not taking your usual COPD medicines as told by your health care provider.  A cold or the flu, which can cause infection in your lungs.  Being exposed to things that make your breathing worse, such as:  Smoke.  Air pollution.  Fumes.  Dust.  Allergies.  Weather changes.  What are the signs or symptoms?  Symptoms do not get better or get worse even if you take your medicines as told by your provider. Symptoms may include:  More shortness of breath. You may only be able to speak one or two words at a time.  More coughing or mucus from your lungs.  More wheezing or chest tightness.  Being more tired and having less energy.  Confusion.  How is this diagnosed?  This condition is diagnosed based on:  Symptoms that get worse.  Your medical history.  A physical exam.  You may also have tests, including:  A chest X-ray.  Blood or mucus tests.  How is this treated?  You may be able to stay home or you may need to go to the hospital. Treatment may include:  Taking medicines. These may include:  Inhalers. These have medicines in them that you breathe in. These may be more of what you already take or they may be new.  Steroids. These reduce inflammation in the airways. These may be inhaled, taken by mouth, or given in an IV.  Antibiotics. These treat infection.  Using oxygen.  Using a device to help you clear mucus.  Follow these instructions at home:  Medicines  Take your medicines only as told by your provider.  If you were given antibiotics or steroids, take them as told by your provider. Do  not stop taking them even if you start to feel better.  Lifestyle  Several times a day, wash your hands with soap and water for at least 20 seconds.  If you cannot use soap and water, use hand .  This may help keep you from getting an infection.  Avoid being around crowds or people who are sick.  Do not smoke or use any products that contain nicotine or tobacco. If you need help quitting, ask your provider.  Return to your normal activities when your provider says that it's safe.  Use breathing methods to control your stress and catch your breath.  How is this prevented?  Follow your COPD action plan. The action plan tells you what to do if you're feeling good and what to do when you start feeling worse. Discuss the plan often with your provider.  Make sure you get all the shots, also called vaccines, that your provider recommends. Ask your provider about a flu shot and a pneumonia shot.  Use oxygen therapy if told by your provider. If you need home oxygen therapy, ask your provider how often to check your oxygen level with a device called an oximeter.  Keep all follow-up visits to review your COPD action plan. Your provider will want to check on your condition often to keep you healthy and out of the hospital.  Contact a health care provider if:  Your COPD symptoms get worse.  You have a fever or chills.  You have trouble doing daily activities.  You have trouble breathing even when you are resting.  Get help right away if:  You are short of breath and cannot:  Talk in full sentences.  Do normal activities.  You have chest pain.  You feel confused.  These symptoms may be an emergency. Call 911 right away.  Do not wait to see if the symptoms will go away.  Do not drive yourself to the hospital.  This information is not intended to replace advice given to you by your health care provider. Make sure you discuss any questions you have with your health care provider.  Document Revised: 09/19/2024 Document  Reviewed: 03/04/2024  Elsevier Patient Education © 2024 [employer]

## 2025-01-26 NOTE — PROGRESS NOTES
Primary Care Provider  Kuldeep Winters MD     Referring Provider  No ref. provider found     Chief Complaint  Asthma-COPD overlap syndrome and Follow-up (6 month. )    Subjective          History of Presenting Illness  Patient is a 64-year-old male, patient of Dr. Le's who presents for management of emphysema and lung nodule who presents for a follow-up visit today.  Patient states that since last visit his breathing is at baseline.  Patient states that he does get short of breath that is worse with exertion, mild to moderate in severity, and improved with rest.  Patient states that he is taking Breztri every day as prescribed and uses albuterol inhaler as needed. Patient is under the care of cardiologist, Dr. Lopes for history of aortic stenosis. Patient denies fever, chills, night sweats, swollen glands in the head and neck, unintentional weight loss, hemoptysis, purulent sputum production, dysphagia, chest pain, palpitations, chest tightness, abdominal pain, nausea, vomiting, and diarrhea.  Patient also denies any myalgias, changes in sense of taste and/or smell, sore throat, any other coronavirus or flu-like symptoms.  Patient denies any leg swelling, orthopnea, paroxysmal nocturnal dyspnea.  Patient is able to perform activities of daily living.        Review of Systems     Family History   Problem Relation Age of Onset    Coronary artery disease Mother     Pulmonary embolism Mother         POST OP    Heart attack Brother     Prostate cancer Brother     COPD Brother     Alcohol abuse Maternal Grandfather         Social History     Socioeconomic History    Marital status:     Number of children: 2   Tobacco Use    Smoking status: Former     Current packs/day: 0.00     Average packs/day: 0.3 packs/day for 18.0 years (4.5 ttl pk-yrs)     Types: Cigarettes     Start date:      Quit date:      Years since quittin.1     Passive exposure: Past    Smokeless tobacco: Former     Types: Chew    Vaping Use    Vaping status: Never Used   Substance and Sexual Activity    Alcohol use: Not Currently     Comment: non-drinker    Drug use: Never     Comment: none    Sexual activity: Defer        Past Medical History:   Diagnosis Date    Acute bronchitis, unspecified     Acute upper respiratory infection, unspecified     Anxiety disorder, unspecified     Anxiety with depression     Chronic kidney disease, stage III (moderate)     Colon polyp     Cough     Dizziness and giddiness     Emphysema, unspecified     Fatigue     GERD (gastroesophageal reflux disease)     Heartburn     Hypertension     Mild intermittent acute bronchitis with asthma with acute exacerbation     Nonrheumatic aortic (valve) stenosis     Vision problem         Immunization History   Administered Date(s) Administered    COVID-19 (PFIZER) 12YRS+ (COMIRNATY) 10/23/2023, 08/07/2024    COVID-19 (PFIZER) BIVALENT 12+YRS 10/10/2022    COVID-19 (PFIZER) Purple Cap Monovalent 04/01/2021, 04/22/2021    Covid-19 (Pfizer) Gray Cap Monovalent 04/08/2022    Flu Vaccine Intradermal Quad 18-64YR 01/03/2013    Flu Vaccine Quad PF >36MO 11/27/2015    Fluzone  >6mos 11/01/2013    Fluzone (or Fluarix & Flulaval for VFC) >6mos 09/20/2021, 10/10/2022, 09/21/2023    Fluzone High-Dose 65+YRS 10/24/2024    Influenza, Unspecified 09/24/2020    PPD Test 04/24/2015    Pneumococcal Conjugate 20-Valent (PCV20) 01/16/2023    Pneumococcal Polysaccharide (PPSV23) 02/24/2020    Tdap 09/20/2021       Allergies   Allergen Reactions    Benadryl [Diphenhydramine] Palpitations          Current Outpatient Medications:     albuterol sulfate  (90 Base) MCG/ACT inhaler, Inhale 2 puffs Every 4 (Four) Hours As Needed for Wheezing for up to 30 days., Disp: 18 g, Rfl: 5    amLODIPine (NORVASC) 10 MG tablet, Take 1 tablet by mouth once daily, Disp: 90 tablet, Rfl: 1    Breztri Aerosphere 160-9-4.8 MCG/ACT aerosol inhaler, Inhale 2 puffs 2 (Two) Times a Day for 30 days. Rinse mouth  "out after each use, Disp: 1 each, Rfl: 5    doxazosin (CARDURA) 4 MG tablet, TAKE 1 TABLET BY MOUTH ONCE DAILY AT NIGHT, Disp: 90 tablet, Rfl: 1    losartan (COZAAR) 50 MG tablet, Take 1 tablet by mouth once daily, Disp: 90 tablet, Rfl: 1    omeprazole (priLOSEC) 40 MG capsule, Take 1 capsule by mouth once daily (Patient taking differently: Take 1 capsule by mouth As Needed.), Disp: 90 capsule, Rfl: 1     Objective     Physical Exam  Vital Signs:   WDWN, Alert, NAD.    HEENT:  PERRL, EOMI.  OP, nares clear, no sinus tenderness  Neck:  Supple, no JVD, no thyromegaly.  Lymph: no axillary, cervical, supraclavicular lymphadenopathy noted bilaterally  Chest:  good aeration, clear to auscultation bilaterally, tympanic to percussion bilaterally, no work of breathing noted  CV: RRR, no MGR, pulses 2+, equal.  Abd:  Soft, NT, ND, + BS, no HSM  EXT:  no clubbing, no cyanosis, no edema, no joint tenderness  Neuro:  A&Ox3, CN grossly intact, no focal deficits.  Skin: No rashes or lesions noted.    /60 (BP Location: Right arm, Patient Position: Sitting, Cuff Size: Large Adult)   Pulse 63   Temp 98.2 °F (36.8 °C) (Oral)   Resp 16   Ht 177.8 cm (70\")   Wt 88.4 kg (194 lb 14.4 oz)   SpO2 95% Comment: room air  BMI 27.97 kg/m²         Result Review :   I have reviewed my last office visit note.     Procedures:           Assessment and Plan      Assessment:  1.  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  Alpha-1 antitrypsin with a normal genotype of M/M.  2. Lung nodule. 5 mm solitary lung nodule right upper lobe.  Stable since 07/2020 with no further follow up necessary per recent chest CT report completed on 7/25/2023.  3.  Dyspnea.        4.  Emphysema.  5.  Peripheral eosinophilia.    6.  Tobacco abuse with cigarettes in remission.  Not eligible for lung cancer screening as patient reports that he quit smoking in 2005.        Plan:  1.  Continue Breztri as prescribed and rinse mouth out after " each use.  2.  Continue albuterol inhaler as needed. COPD action plan discussed with the patient the office today.  3.  Patient is advised to avoid burning wood.  Discussed with patient that exposure to wood-burning smoke can cause asthma attacks and bronchitis and also can aggravate heart and lung disease.  4.  Lung nodule stable since July 2020 with no further follow-up necessary per recent chest CT report.  5.  Vaccination status: patient reports they are up-to-date with flu, pneumonia, and Covid vaccines.  Patient is advised to continue to follow CDC recommendations such as social distancing wearing a mask and washing hands for at least 20 seconds.  6.  Smoking status: patient is a former cigarette smoker.  Not eligible for lung cancer screening as patient reports that he quit smoking in 2005.  7.  Patient to call the office, 911, or go to the ER with new or worsening symptoms.  8.  Follow-up in 6 months, sooner if needed.           Follow Up   Return in about 6 months (around 8/6/2025) for in [place].  Patient was given instructions and counseling regarding his condition or for health maintenance advice. Please see specific information pulled into the AVS if appropriate.

## 2025-02-06 ENCOUNTER — OFFICE VISIT (OUTPATIENT)
Dept: PULMONOLOGY | Facility: CLINIC | Age: 65
End: 2025-02-06
Payer: MEDICARE

## 2025-02-06 VITALS
RESPIRATION RATE: 16 BRPM | SYSTOLIC BLOOD PRESSURE: 120 MMHG | HEIGHT: 70 IN | TEMPERATURE: 98.2 F | WEIGHT: 194.9 LBS | DIASTOLIC BLOOD PRESSURE: 60 MMHG | HEART RATE: 63 BPM | OXYGEN SATURATION: 95 % | BODY MASS INDEX: 27.9 KG/M2

## 2025-02-06 DIAGNOSIS — F17.201 TOBACCO ABUSE, IN REMISSION: Primary | ICD-10-CM

## 2025-02-06 DIAGNOSIS — R06.00 DYSPNEA, UNSPECIFIED TYPE: ICD-10-CM

## 2025-02-06 DIAGNOSIS — D72.19 PERIPHERAL EOSINOPHILIA: ICD-10-CM

## 2025-02-06 DIAGNOSIS — J43.9 PULMONARY EMPHYSEMA, UNSPECIFIED EMPHYSEMA TYPE: ICD-10-CM

## 2025-02-06 DIAGNOSIS — R91.1 LUNG NODULE: ICD-10-CM

## 2025-02-06 DIAGNOSIS — J44.89 ASTHMA-COPD OVERLAP SYNDROME: ICD-10-CM

## 2025-02-06 PROCEDURE — 3074F SYST BP LT 130 MM HG: CPT | Performed by: NURSE PRACTITIONER

## 2025-02-06 PROCEDURE — 1159F MED LIST DOCD IN RCRD: CPT | Performed by: NURSE PRACTITIONER

## 2025-02-06 PROCEDURE — 99214 OFFICE O/P EST MOD 30 MIN: CPT | Performed by: NURSE PRACTITIONER

## 2025-02-06 PROCEDURE — 3078F DIAST BP <80 MM HG: CPT | Performed by: NURSE PRACTITIONER

## 2025-02-06 PROCEDURE — 1160F RVW MEDS BY RX/DR IN RCRD: CPT | Performed by: NURSE PRACTITIONER

## 2025-02-06 RX ORDER — ALBUTEROL SULFATE 90 UG/1
2 INHALANT RESPIRATORY (INHALATION) EVERY 4 HOURS PRN
Qty: 18 G | Refills: 5 | Status: SHIPPED | OUTPATIENT
Start: 2025-02-06 | End: 2025-03-08

## 2025-02-06 RX ORDER — BUDESONIDE, GLYCOPYRROLATE, AND FORMOTEROL FUMARATE 160; 9; 4.8 UG/1; UG/1; UG/1
2 AEROSOL, METERED RESPIRATORY (INHALATION) 2 TIMES DAILY
Qty: 1 EACH | Refills: 5 | Status: SHIPPED | OUTPATIENT
Start: 2025-02-06 | End: 2025-03-08

## 2025-03-13 ENCOUNTER — TRANSCRIBE ORDERS (OUTPATIENT)
Dept: ADMINISTRATIVE | Facility: HOSPITAL | Age: 65
End: 2025-03-13
Payer: MEDICARE

## 2025-03-13 ENCOUNTER — LAB (OUTPATIENT)
Dept: LAB | Facility: HOSPITAL | Age: 65
End: 2025-03-13
Payer: MEDICARE

## 2025-03-13 DIAGNOSIS — N18.31 CHRONIC KIDNEY DISEASE (CKD) STAGE G3A/A1, MODERATELY DECREASED GLOMERULAR FILTRATION RATE (GFR) BETWEEN 45-59 ML/MIN/1.73 SQUARE METER AND ALBUMINURIA CREATININE RATIO LESS THAN 30 MG/G (CMS/H*: ICD-10-CM

## 2025-03-13 DIAGNOSIS — N18.31 CHRONIC KIDNEY DISEASE (CKD) STAGE G3A/A1, MODERATELY DECREASED GLOMERULAR FILTRATION RATE (GFR) BETWEEN 45-59 ML/MIN/1.73 SQUARE METER AND ALBUMINURIA CREATININE RATIO LESS THAN 30 MG/G (CMS/H*: Primary | ICD-10-CM

## 2025-03-13 LAB
ALBUMIN SERPL-MCNC: 4.4 G/DL (ref 3.5–5.2)
ANION GAP SERPL CALCULATED.3IONS-SCNC: 11.1 MMOL/L (ref 5–15)
BACTERIA UR QL AUTO: NORMAL /HPF
BASOPHILS # BLD AUTO: 0.06 10*3/MM3 (ref 0–0.2)
BASOPHILS NFR BLD AUTO: 1 % (ref 0–1.5)
BILIRUB UR QL STRIP: NEGATIVE
BUN SERPL-MCNC: 18 MG/DL (ref 8–23)
BUN/CREAT SERPL: 10.1 (ref 7–25)
CALCIUM SPEC-SCNC: 9.3 MG/DL (ref 8.6–10.5)
CHLORIDE SERPL-SCNC: 104 MMOL/L (ref 98–107)
CLARITY UR: CLEAR
CO2 SERPL-SCNC: 25.9 MMOL/L (ref 22–29)
COLOR UR: YELLOW
CREAT SERPL-MCNC: 1.78 MG/DL (ref 0.76–1.27)
CREAT UR-MCNC: 108.7 MG/DL
DEPRECATED RDW RBC AUTO: 40.6 FL (ref 37–54)
EGFRCR SERPLBLD CKD-EPI 2021: 41.6 ML/MIN/1.73
EOSINOPHIL # BLD AUTO: 0.43 10*3/MM3 (ref 0–0.4)
EOSINOPHIL NFR BLD AUTO: 6.8 % (ref 0.3–6.2)
ERYTHROCYTE [DISTWIDTH] IN BLOOD BY AUTOMATED COUNT: 11.5 % (ref 12.3–15.4)
GLUCOSE SERPL-MCNC: 95 MG/DL (ref 65–99)
GLUCOSE UR STRIP-MCNC: NEGATIVE MG/DL
HCT VFR BLD AUTO: 45.5 % (ref 37.5–51)
HGB BLD-MCNC: 15.8 G/DL (ref 13–17.7)
HGB UR QL STRIP.AUTO: ABNORMAL
IMM GRANULOCYTES # BLD AUTO: 0.01 10*3/MM3 (ref 0–0.05)
IMM GRANULOCYTES NFR BLD AUTO: 0.2 % (ref 0–0.5)
KETONES UR QL STRIP: NEGATIVE
LEUKOCYTE ESTERASE UR QL STRIP.AUTO: NEGATIVE
LYMPHOCYTES # BLD AUTO: 1.8 10*3/MM3 (ref 0.7–3.1)
LYMPHOCYTES NFR BLD AUTO: 28.6 % (ref 19.6–45.3)
MCH RBC QN AUTO: 33.1 PG (ref 26.6–33)
MCHC RBC AUTO-ENTMCNC: 34.7 G/DL (ref 31.5–35.7)
MCV RBC AUTO: 95.2 FL (ref 79–97)
MONOCYTES # BLD AUTO: 0.58 10*3/MM3 (ref 0.1–0.9)
MONOCYTES NFR BLD AUTO: 9.2 % (ref 5–12)
NEUTROPHILS NFR BLD AUTO: 3.42 10*3/MM3 (ref 1.7–7)
NEUTROPHILS NFR BLD AUTO: 54.2 % (ref 42.7–76)
NITRITE UR QL STRIP: NEGATIVE
PH UR STRIP.AUTO: 6 [PH] (ref 5–8)
PHOSPHATE SERPL-MCNC: 2.5 MG/DL (ref 2.5–4.5)
PLATELET # BLD AUTO: 152 10*3/MM3 (ref 140–450)
PMV BLD AUTO: 10.7 FL (ref 6–12)
POTASSIUM SERPL-SCNC: 3.9 MMOL/L (ref 3.5–5.2)
PROT ?TM UR-MCNC: 8.3 MG/DL
PROT UR QL STRIP: NEGATIVE
PROT/CREAT UR: 0.08 MG/G{CREAT}
PTH-INTACT SERPL-MCNC: 60 PG/ML (ref 15–65)
RBC # BLD AUTO: 4.78 10*6/MM3 (ref 4.14–5.8)
RBC # UR STRIP: NORMAL /HPF
REF LAB TEST METHOD: NORMAL
SODIUM SERPL-SCNC: 141 MMOL/L (ref 136–145)
SP GR UR STRIP: 1.01 (ref 1–1.03)
SQUAMOUS #/AREA URNS HPF: NORMAL /HPF
UROBILINOGEN UR QL STRIP: ABNORMAL
WBC # UR STRIP: NORMAL /HPF
WBC NRBC COR # BLD AUTO: 6.3 10*3/MM3 (ref 3.4–10.8)

## 2025-03-13 PROCEDURE — 83970 ASSAY OF PARATHORMONE: CPT

## 2025-03-13 PROCEDURE — 84156 ASSAY OF PROTEIN URINE: CPT

## 2025-03-13 PROCEDURE — 81001 URINALYSIS AUTO W/SCOPE: CPT

## 2025-03-13 PROCEDURE — 80069 RENAL FUNCTION PANEL: CPT

## 2025-03-13 PROCEDURE — 36415 COLL VENOUS BLD VENIPUNCTURE: CPT

## 2025-03-13 PROCEDURE — 82570 ASSAY OF URINE CREATININE: CPT

## 2025-03-13 PROCEDURE — 85025 COMPLETE CBC W/AUTO DIFF WBC: CPT

## 2025-03-31 ENCOUNTER — TELEPHONE (OUTPATIENT)
Dept: PULMONOLOGY | Facility: CLINIC | Age: 65
End: 2025-03-31
Payer: MEDICARE

## 2025-03-31 NOTE — TELEPHONE ENCOUNTER
Caller: Chad Oneill    Relationship: Self    Best call back number: [phone]     PT IS BRINGING PAPERWORK TOMORROW TO DROP OFF TO GET FILLED OUT FOR HELP WITH THE [facility]ST OF THE [facility]

## 2025-04-01 ENCOUNTER — TELEPHONE (OUTPATIENT)
Dept: FAMILY MEDICINE CLINIC | Age: 65
End: 2025-04-01
Payer: MEDICARE

## 2025-04-01 NOTE — TELEPHONE ENCOUNTER
Patient dropped off assistance paperwork from [place] & [place] to the [facility].  Will hold until Pulmonary is here on Thursday 4/3/2025.

## 2025-04-01 NOTE — TELEPHONE ENCOUNTER
Pt came into office and requested a prescription for prednisone be sent in. He states that he is coughing a lot and it is significantly worse at night; he thinks that he still has bronchitis. Please advise.

## 2025-04-21 ENCOUNTER — LAB (OUTPATIENT)
Dept: LAB | Facility: HOSPITAL | Age: 65
End: 2025-04-21
Payer: MEDICARE

## 2025-04-21 ENCOUNTER — OFFICE VISIT (OUTPATIENT)
Dept: FAMILY MEDICINE CLINIC | Age: 65
End: 2025-04-21
Payer: MEDICARE

## 2025-04-21 VITALS
SYSTOLIC BLOOD PRESSURE: 162 MMHG | DIASTOLIC BLOOD PRESSURE: 86 MMHG | BODY MASS INDEX: 28.06 KG/M2 | TEMPERATURE: 98.3 F | WEIGHT: 196 LBS | HEART RATE: 68 BPM | HEIGHT: 70 IN | OXYGEN SATURATION: 97 %

## 2025-04-21 DIAGNOSIS — N18.31 STAGE 3A CHRONIC KIDNEY DISEASE: ICD-10-CM

## 2025-04-21 DIAGNOSIS — N02.B9 IGA NEPHROPATHY: ICD-10-CM

## 2025-04-21 DIAGNOSIS — E78.2 MIXED HYPERLIPIDEMIA: ICD-10-CM

## 2025-04-21 DIAGNOSIS — I10 HYPERTENSION, ESSENTIAL: Primary | ICD-10-CM

## 2025-04-21 DIAGNOSIS — I35.1 NONRHEUMATIC AORTIC VALVE INSUFFICIENCY: ICD-10-CM

## 2025-04-21 DIAGNOSIS — R79.89 ELEVATED LFTS: ICD-10-CM

## 2025-04-21 LAB
ALBUMIN SERPL-MCNC: 4.8 G/DL (ref 3.5–5.2)
ALBUMIN/GLOB SERPL: 2.2 G/DL
ALP SERPL-CCNC: 53 U/L (ref 39–117)
ALT SERPL W P-5'-P-CCNC: 38 U/L (ref 1–41)
ANION GAP SERPL CALCULATED.3IONS-SCNC: 10.4 MMOL/L (ref 5–15)
AST SERPL-CCNC: 32 U/L (ref 1–40)
BILIRUB SERPL-MCNC: 0.9 MG/DL (ref 0–1.2)
BUN SERPL-MCNC: 16 MG/DL (ref 8–23)
BUN/CREAT SERPL: 9.9 (ref 7–25)
CALCIUM SPEC-SCNC: 9.4 MG/DL (ref 8.6–10.5)
CHLORIDE SERPL-SCNC: 102 MMOL/L (ref 98–107)
CHOLEST SERPL-MCNC: 173 MG/DL (ref 0–200)
CO2 SERPL-SCNC: 25.6 MMOL/L (ref 22–29)
CREAT SERPL-MCNC: 1.62 MG/DL (ref 0.76–1.27)
EGFRCR SERPLBLD CKD-EPI 2021: 46.5 ML/MIN/1.73
GLOBULIN UR ELPH-MCNC: 2.2 GM/DL
GLUCOSE SERPL-MCNC: 93 MG/DL (ref 65–99)
HDLC SERPL-MCNC: 36 MG/DL (ref 40–60)
LDLC SERPL CALC-MCNC: 111 MG/DL (ref 0–100)
LDLC/HDLC SERPL: 2.99 {RATIO}
POTASSIUM SERPL-SCNC: 4.3 MMOL/L (ref 3.5–5.2)
PROT SERPL-MCNC: 7 G/DL (ref 6–8.5)
SODIUM SERPL-SCNC: 138 MMOL/L (ref 136–145)
TRIGL SERPL-MCNC: 146 MG/DL (ref 0–150)
VLDLC SERPL-MCNC: 26 MG/DL (ref 5–40)

## 2025-04-21 PROCEDURE — 80053 COMPREHEN METABOLIC PANEL: CPT | Performed by: FAMILY MEDICINE

## 2025-04-21 PROCEDURE — G2211 COMPLEX E/M VISIT ADD ON: HCPCS | Performed by: FAMILY MEDICINE

## 2025-04-21 PROCEDURE — 99214 OFFICE O/P EST MOD 30 MIN: CPT | Performed by: FAMILY MEDICINE

## 2025-04-21 PROCEDURE — 3079F DIAST BP 80-89 MM HG: CPT | Performed by: FAMILY MEDICINE

## 2025-04-21 PROCEDURE — 1126F AMNT PAIN NOTED NONE PRSNT: CPT | Performed by: FAMILY MEDICINE

## 2025-04-21 PROCEDURE — 3077F SYST BP >= 140 MM HG: CPT | Performed by: FAMILY MEDICINE

## 2025-04-21 PROCEDURE — 80061 LIPID PANEL: CPT | Performed by: FAMILY MEDICINE

## 2025-04-21 PROCEDURE — 36415 COLL VENOUS BLD VENIPUNCTURE: CPT | Performed by: FAMILY MEDICINE

## 2025-04-21 RX ORDER — LOSARTAN POTASSIUM 100 MG/1
100 TABLET ORAL DAILY
Qty: 30 TABLET | Refills: 1 | Status: SHIPPED | OUTPATIENT
Start: 2025-04-21

## 2025-04-21 NOTE — ASSESSMENT & PLAN NOTE
Has considerable heart murmur.  He is being followed by cardiology.  Last echocardiogram was June 2024.  Keep cardiology follow-up.  I am going to increase his losartan today.   Greater than or equal to 45

## 2025-04-21 NOTE — PROGRESS NOTES
"Chief Complaint  Hypertension    Subjective          Chad Oneill presents to [facility]  History of Present Illness    Patient in for general follow-up on his chronic health issues.  He says he feels good    Does not follow blood pressure at home    He saw nephrology March 28 for follow-up on his CKD 3B secondary to nephrosclerosis and IgA nephropathy.    Review of his 2-year weight graph shows that his weight is up about 6 pounds since first of this year.        Current Outpatient Medications on File Prior to Visit   Medication Sig Dispense Refill    albuterol sulfate  (90 Base) MCG/ACT inhaler Inhale 2 puffs Every 4 (Four) Hours As Needed for Wheezing for up to 30 days. 18 g 5    amLODIPine (NORVASC) 10 MG tablet Take 1 tablet by mouth once daily 90 tablet 1    doxazosin (CARDURA) 4 MG tablet TAKE 1 TABLET BY MOUTH ONCE DAILY AT NIGHT 90 tablet 1    omeprazole (priLOSEC) 40 MG capsule Take 1 capsule by mouth once daily (Patient taking differently: Take 1 capsule by mouth As Needed.) 90 capsule 1    [DISCONTINUED] losartan (COZAAR) 50 MG tablet Take 1 tablet by mouth once daily 90 tablet 1     No current facility-administered medications on file prior to visit.       Review of Systems         Objective   Vital Signs:   /86 Comment: right arm, reg cuff, by stiles  Pulse 68   Temp 98.3 °F (36.8 °C) (Temporal)   Ht 177.8 cm (70\")   Wt 88.9 kg (196 lb)   SpO2 97%   BMI 28.12 kg/m²     Physical Exam  Constitutional:       Appearance: Normal appearance.   HENT:      Right Ear: Tympanic membrane normal.      Left Ear: Tympanic membrane normal.      Mouth/Throat:      Pharynx: No oropharyngeal exudate or posterior oropharyngeal erythema.   Eyes:      General: No scleral icterus.  Neck:      Vascular: No carotid bruit.   Cardiovascular:      Rate and Rhythm: Normal rate and regular rhythm.      Heart sounds: Normal heart sounds. Murmur (3-4/6 sys and 2/6 carlie murmur) " heard.   Pulmonary:      Effort: No respiratory distress.      Breath sounds: No wheezing or rales.   Musculoskeletal:      Right lower leg: No edema.      Left lower leg: No edema.   Lymphadenopathy:      Cervical: No cervical adenopathy.   Neurological:      General: No focal deficit present.      Mental Status: He is alert.   Psychiatric:         Attention and Perception: Attention normal.         Mood and Affect: Mood normal.         Speech: Speech normal.              Result Review :                     Assessment and Plan    Diagnoses and all orders for this visit:    1. Hypertension, essential (Primary)  Assessment & Plan:  Will increase his losartan to 100 mg. Get follow-up labs in 2 to 3 months.      Orders:  -     Comprehensive Metabolic Panel  -     losartan (COZAAR) 100 MG tablet; Take 1 tablet by mouth Daily.  Dispense: 30 tablet; Refill: 1    2. IgA nephropathy  -     Comprehensive Metabolic Panel    3. Stage 3a chronic kidney disease  -     Comprehensive Metabolic Panel    4. Elevated LFTs  -     Comprehensive Metabolic Panel    5. Mixed hyperlipidemia  -     Comprehensive Metabolic Panel  -     Lipid Panel    6. Nonrheumatic aortic valve insufficiency  Assessment & Plan:  Has considerable heart murmur.  He is being followed by cardiology.  Last echocardiogram was June 2024.  Keep cardiology follow-up.  I am going to increase his losartan today.          Follow Up   No follow-ups on file.  Patient was given instructions and counseling regarding his condition or for health maintenance advice. Please see specific information pulled into the AVS if appropriate.

## 2025-04-22 ENCOUNTER — RESULTS FOLLOW-UP (OUTPATIENT)
Dept: FAMILY MEDICINE CLINIC | Age: 65
End: 2025-04-22
Payer: MEDICARE

## 2025-05-02 DIAGNOSIS — I10 HYPERTENSION, ESSENTIAL: ICD-10-CM

## 2025-05-02 DIAGNOSIS — R12 HEARTBURN: ICD-10-CM

## 2025-05-02 RX ORDER — OMEPRAZOLE 40 MG/1
40 CAPSULE, DELAYED RELEASE ORAL DAILY
Qty: 90 CAPSULE | Refills: 1 | Status: SHIPPED | OUTPATIENT
Start: 2025-05-02

## 2025-05-02 RX ORDER — DOXAZOSIN 4 MG/1
4 TABLET ORAL NIGHTLY
Qty: 90 TABLET | Refills: 1 | Status: SHIPPED | OUTPATIENT
Start: 2025-05-02

## 2025-05-02 RX ORDER — AMLODIPINE BESYLATE 10 MG/1
10 TABLET ORAL DAILY
Qty: 90 TABLET | Refills: 1 | Status: SHIPPED | OUTPATIENT
Start: 2025-05-02

## 2025-07-30 DIAGNOSIS — I10 HYPERTENSION, ESSENTIAL: ICD-10-CM

## 2025-07-30 RX ORDER — LOSARTAN POTASSIUM 100 MG/1
100 TABLET ORAL DAILY
Qty: 30 TABLET | Refills: 0 | Status: SHIPPED | OUTPATIENT
Start: 2025-07-30

## 2025-07-30 NOTE — TELEPHONE ENCOUNTER
You mentioned in your 4/21/25 note that you were increasing his losartan and wanted to get labs drawn in 2-3 months. Please advise.

## 2025-08-21 ENCOUNTER — OFFICE VISIT (OUTPATIENT)
Dept: PULMONOLOGY | Facility: CLINIC | Age: 65
End: 2025-08-21
Payer: MEDICARE

## 2025-08-21 ENCOUNTER — EXTERNAL PBMM DATA (OUTPATIENT)
Dept: PHARMACY | Facility: OTHER | Age: 65
End: 2025-08-21
Payer: MEDICARE

## 2025-08-21 VITALS
WEIGHT: 188.4 LBS | HEIGHT: 70 IN | DIASTOLIC BLOOD PRESSURE: 69 MMHG | HEART RATE: 69 BPM | SYSTOLIC BLOOD PRESSURE: 113 MMHG | OXYGEN SATURATION: 96 % | RESPIRATION RATE: 16 BRPM | TEMPERATURE: 97.9 F | BODY MASS INDEX: 26.97 KG/M2

## 2025-08-21 DIAGNOSIS — R06.00 DYSPNEA, UNSPECIFIED TYPE: ICD-10-CM

## 2025-08-21 DIAGNOSIS — D72.19 PERIPHERAL EOSINOPHILIA: ICD-10-CM

## 2025-08-21 DIAGNOSIS — J43.9 PULMONARY EMPHYSEMA, UNSPECIFIED EMPHYSEMA TYPE: ICD-10-CM

## 2025-08-21 DIAGNOSIS — F17.201 TOBACCO ABUSE, IN REMISSION: ICD-10-CM

## 2025-08-21 DIAGNOSIS — J44.89 ASTHMA-COPD OVERLAP SYNDROME: Primary | ICD-10-CM

## 2025-08-21 PROCEDURE — 99214 OFFICE O/P EST MOD 30 MIN: CPT | Performed by: NURSE PRACTITIONER

## 2025-08-21 PROCEDURE — 3074F SYST BP LT 130 MM HG: CPT | Performed by: NURSE PRACTITIONER

## 2025-08-21 PROCEDURE — 3078F DIAST BP <80 MM HG: CPT | Performed by: NURSE PRACTITIONER

## 2025-08-21 PROCEDURE — 1160F RVW MEDS BY RX/DR IN RCRD: CPT | Performed by: NURSE PRACTITIONER

## 2025-08-21 PROCEDURE — 1159F MED LIST DOCD IN RCRD: CPT | Performed by: NURSE PRACTITIONER

## 2025-08-21 RX ORDER — BUDESONIDE, GLYCOPYRROLATE, AND FORMOTEROL FUMARATE 160; 9; 4.8 UG/1; UG/1; UG/1
2 AEROSOL, METERED RESPIRATORY (INHALATION) 2 TIMES DAILY
COMMUNITY